# Patient Record
Sex: MALE | Race: WHITE | NOT HISPANIC OR LATINO | URBAN - METROPOLITAN AREA
[De-identification: names, ages, dates, MRNs, and addresses within clinical notes are randomized per-mention and may not be internally consistent; named-entity substitution may affect disease eponyms.]

---

## 2018-11-20 ENCOUNTER — INPATIENT (INPATIENT)
Facility: HOSPITAL | Age: 63
LOS: 1 days | Discharge: HOME | End: 2018-11-22
Attending: INTERNAL MEDICINE | Admitting: INTERNAL MEDICINE
Payer: COMMERCIAL

## 2018-11-20 VITALS
SYSTOLIC BLOOD PRESSURE: 142 MMHG | HEIGHT: 67 IN | HEART RATE: 67 BPM | TEMPERATURE: 98 F | OXYGEN SATURATION: 97 % | RESPIRATION RATE: 18 BRPM | DIASTOLIC BLOOD PRESSURE: 73 MMHG

## 2018-11-20 LAB
ABO RH CONFIRMATION: SIGNIFICANT CHANGE UP
ALBUMIN SERPL ELPH-MCNC: 3.3 G/DL — SIGNIFICANT CHANGE UP (ref 3.3–5)
ALP SERPL-CCNC: 63 U/L — SIGNIFICANT CHANGE UP (ref 40–120)
ALT FLD-CCNC: 20 U/L — SIGNIFICANT CHANGE UP (ref 12–78)
ANION GAP SERPL CALC-SCNC: 8 MMOL/L — SIGNIFICANT CHANGE UP (ref 5–17)
APTT BLD: 35.6 SEC — SIGNIFICANT CHANGE UP (ref 27.5–36.3)
AST SERPL-CCNC: 14 U/L — LOW (ref 15–37)
BASOPHILS # BLD AUTO: 0.06 K/UL — SIGNIFICANT CHANGE UP (ref 0–0.2)
BASOPHILS NFR BLD AUTO: 0.6 % — SIGNIFICANT CHANGE UP (ref 0–2)
BILIRUB SERPL-MCNC: 0.5 MG/DL — SIGNIFICANT CHANGE UP (ref 0.2–1.2)
BLD GP AB SCN SERPL QL: SIGNIFICANT CHANGE UP
BUN SERPL-MCNC: 15 MG/DL — SIGNIFICANT CHANGE UP (ref 7–23)
CALCIUM SERPL-MCNC: 9.6 MG/DL — SIGNIFICANT CHANGE UP (ref 8.5–10.1)
CHLORIDE SERPL-SCNC: 110 MMOL/L — HIGH (ref 96–108)
CK SERPL-CCNC: 129 U/L — SIGNIFICANT CHANGE UP (ref 26–308)
CO2 SERPL-SCNC: 25 MMOL/L — SIGNIFICANT CHANGE UP (ref 22–31)
CREAT SERPL-MCNC: 0.8 MG/DL — SIGNIFICANT CHANGE UP (ref 0.5–1.3)
EOSINOPHIL # BLD AUTO: 0.15 K/UL — SIGNIFICANT CHANGE UP (ref 0–0.5)
EOSINOPHIL NFR BLD AUTO: 1.6 % — SIGNIFICANT CHANGE UP (ref 0–6)
GLUCOSE SERPL-MCNC: 116 MG/DL — HIGH (ref 70–99)
HBA1C BLD-MCNC: 5.5 % — SIGNIFICANT CHANGE UP (ref 4–5.6)
HCT VFR BLD CALC: 41.9 % — SIGNIFICANT CHANGE UP (ref 39–50)
HGB BLD-MCNC: 13.9 G/DL — SIGNIFICANT CHANGE UP (ref 13–17)
IMM GRANULOCYTES NFR BLD AUTO: 0.4 % — SIGNIFICANT CHANGE UP (ref 0–1.5)
INR BLD: 1.09 RATIO — SIGNIFICANT CHANGE UP (ref 0.88–1.16)
LYMPHOCYTES # BLD AUTO: 1.84 K/UL — SIGNIFICANT CHANGE UP (ref 1–3.3)
LYMPHOCYTES # BLD AUTO: 19 % — SIGNIFICANT CHANGE UP (ref 13–44)
MCHC RBC-ENTMCNC: 30.4 PG — SIGNIFICANT CHANGE UP (ref 27–34)
MCHC RBC-ENTMCNC: 33.2 GM/DL — SIGNIFICANT CHANGE UP (ref 32–36)
MCV RBC AUTO: 91.7 FL — SIGNIFICANT CHANGE UP (ref 80–100)
MONOCYTES # BLD AUTO: 0.63 K/UL — SIGNIFICANT CHANGE UP (ref 0–0.9)
MONOCYTES NFR BLD AUTO: 6.5 % — SIGNIFICANT CHANGE UP (ref 2–14)
NEUTROPHILS # BLD AUTO: 6.95 K/UL — SIGNIFICANT CHANGE UP (ref 1.8–7.4)
NEUTROPHILS NFR BLD AUTO: 71.9 % — SIGNIFICANT CHANGE UP (ref 43–77)
NRBC # BLD: 0 /100 WBCS — SIGNIFICANT CHANGE UP (ref 0–0)
PLATELET # BLD AUTO: 220 K/UL — SIGNIFICANT CHANGE UP (ref 150–400)
POTASSIUM SERPL-MCNC: 4.1 MMOL/L — SIGNIFICANT CHANGE UP (ref 3.5–5.3)
POTASSIUM SERPL-SCNC: 4.1 MMOL/L — SIGNIFICANT CHANGE UP (ref 3.5–5.3)
PROT SERPL-MCNC: 6.2 GM/DL — SIGNIFICANT CHANGE UP (ref 6–8.3)
PROTHROM AB SERPL-ACNC: 12.1 SEC — SIGNIFICANT CHANGE UP (ref 10–12.9)
RBC # BLD: 4.57 M/UL — SIGNIFICANT CHANGE UP (ref 4.2–5.8)
RBC # FLD: 14.5 % — SIGNIFICANT CHANGE UP (ref 10.3–14.5)
SODIUM SERPL-SCNC: 143 MMOL/L — SIGNIFICANT CHANGE UP (ref 135–145)
TROPONIN I SERPL-MCNC: <0.015 NG/ML — SIGNIFICANT CHANGE UP (ref 0.01–0.04)
TYPE + AB SCN PNL BLD: SIGNIFICANT CHANGE UP
WBC # BLD: 9.67 K/UL — SIGNIFICANT CHANGE UP (ref 3.8–10.5)
WBC # FLD AUTO: 9.67 K/UL — SIGNIFICANT CHANGE UP (ref 3.8–10.5)

## 2018-11-20 PROCEDURE — 71045 X-RAY EXAM CHEST 1 VIEW: CPT | Mod: 26

## 2018-11-20 PROCEDURE — 70551 MRI BRAIN STEM W/O DYE: CPT | Mod: 26

## 2018-11-20 PROCEDURE — 70496 CT ANGIOGRAPHY HEAD: CPT | Mod: 26

## 2018-11-20 PROCEDURE — 99223 1ST HOSP IP/OBS HIGH 75: CPT

## 2018-11-20 PROCEDURE — 93010 ELECTROCARDIOGRAM REPORT: CPT

## 2018-11-20 PROCEDURE — 70450 CT HEAD/BRAIN W/O DYE: CPT | Mod: 26,59

## 2018-11-20 PROCEDURE — 99285 EMERGENCY DEPT VISIT HI MDM: CPT

## 2018-11-20 PROCEDURE — 93880 EXTRACRANIAL BILAT STUDY: CPT | Mod: 26

## 2018-11-20 PROCEDURE — 70498 CT ANGIOGRAPHY NECK: CPT | Mod: 26

## 2018-11-20 RX ORDER — PANTOPRAZOLE SODIUM 20 MG/1
40 TABLET, DELAYED RELEASE ORAL
Qty: 0 | Refills: 0 | Status: DISCONTINUED | OUTPATIENT
Start: 2018-11-20 | End: 2018-11-22

## 2018-11-20 RX ORDER — NICOTINE POLACRILEX 2 MG
1 GUM BUCCAL DAILY
Qty: 0 | Refills: 0 | Status: DISCONTINUED | OUTPATIENT
Start: 2018-11-20 | End: 2018-11-22

## 2018-11-20 RX ORDER — ENOXAPARIN SODIUM 100 MG/ML
40 INJECTION SUBCUTANEOUS EVERY 24 HOURS
Qty: 0 | Refills: 0 | Status: DISCONTINUED | OUTPATIENT
Start: 2018-11-20 | End: 2018-11-22

## 2018-11-20 RX ORDER — ATORVASTATIN CALCIUM 80 MG/1
80 TABLET, FILM COATED ORAL AT BEDTIME
Qty: 0 | Refills: 0 | Status: DISCONTINUED | OUTPATIENT
Start: 2018-11-20 | End: 2018-11-22

## 2018-11-20 RX ORDER — IBUPROFEN 200 MG
600 TABLET ORAL ONCE
Qty: 0 | Refills: 0 | Status: COMPLETED | OUTPATIENT
Start: 2018-11-20 | End: 2018-11-20

## 2018-11-20 RX ORDER — ASPIRIN/CALCIUM CARB/MAGNESIUM 324 MG
81 TABLET ORAL DAILY
Qty: 0 | Refills: 0 | Status: DISCONTINUED | OUTPATIENT
Start: 2018-11-20 | End: 2018-11-22

## 2018-11-20 RX ORDER — ASPIRIN/CALCIUM CARB/MAGNESIUM 324 MG
325 TABLET ORAL ONCE
Qty: 0 | Refills: 0 | Status: COMPLETED | OUTPATIENT
Start: 2018-11-20 | End: 2018-11-20

## 2018-11-20 RX ORDER — METOPROLOL TARTRATE 50 MG
25 TABLET ORAL AT BEDTIME
Qty: 0 | Refills: 0 | Status: DISCONTINUED | OUTPATIENT
Start: 2018-11-20 | End: 2018-11-22

## 2018-11-20 RX ORDER — TETANUS TOXOID, REDUCED DIPHTHERIA TOXOID AND ACELLULAR PERTUSSIS VACCINE, ADSORBED 5; 2.5; 8; 8; 2.5 [IU]/.5ML; [IU]/.5ML; UG/.5ML; UG/.5ML; UG/.5ML
0.5 SUSPENSION INTRAMUSCULAR ONCE
Qty: 0 | Refills: 0 | Status: COMPLETED | OUTPATIENT
Start: 2018-11-20 | End: 2018-11-20

## 2018-11-20 RX ADMIN — TETANUS TOXOID, REDUCED DIPHTHERIA TOXOID AND ACELLULAR PERTUSSIS VACCINE, ADSORBED 0.5 MILLILITER(S): 5; 2.5; 8; 8; 2.5 SUSPENSION INTRAMUSCULAR at 10:34

## 2018-11-20 RX ADMIN — Medication 1 PATCH: at 21:23

## 2018-11-20 RX ADMIN — ENOXAPARIN SODIUM 40 MILLIGRAM(S): 100 INJECTION SUBCUTANEOUS at 16:23

## 2018-11-20 RX ADMIN — Medication 25 MILLIGRAM(S): at 21:48

## 2018-11-20 RX ADMIN — Medication 1 PATCH: at 17:14

## 2018-11-20 RX ADMIN — ATORVASTATIN CALCIUM 80 MILLIGRAM(S): 80 TABLET, FILM COATED ORAL at 21:48

## 2018-11-20 RX ADMIN — Medication 325 MILLIGRAM(S): at 09:24

## 2018-11-20 RX ADMIN — Medication 600 MILLIGRAM(S): at 22:39

## 2018-11-20 NOTE — H&P ADULT - ASSESSMENT
Left sided weakness likely d/t acute CVA not visualized on CT/CTA head  MRI head  ck lipids / A1c  lipitor 80mg HS (takes 10 crestor at home)  start asa 81mg / protonix po  appreciate neuro input  PT/OT consult  admit to tele for cardiac monitoring r/o arrhythmia    Left ICA stenosis on CTA neck  vascular consult  statin      Nicotine dependence  smoking cessation education/counseling   start nicotine patch    CAD/hx CABG  chest pain free  con't home meds: statin/BB    DVT PPX  lovenox    IMPROVE VTE Individual Risk Assessment    RISK                                                                Points    [  ] Previous VTE                                                  3    [  ] Thrombophilia                                               2    [  ] Lower limb paralysis                                      2        (unable to hold up >15 seconds)      [  ] Current Cancer                                              2         (within 6 months)    [  ] Immobilization > 24 hrs                                1    [  ] ICU/CCU stay > 24 hours                              1    [ x] Age > 60                                                      1    IMPROVE VTE Score 1    Dispo  goals of care and plan of care d/w patient and spouse at bedside  full code  pt and spouse in agreement with plan of care

## 2018-11-20 NOTE — CONSULT NOTE ADULT - ASSESSMENT
PT is a 63 year old man with extensive cardiac history, +HTN, +CAD s/p CABG, and HLD who presented to the ED c/o a strange feeling on his left side, unsteady gait, and double vision.     Dx most likely stroke not visualized on CT    Recommendations:  Admit to Telemetry for cardiac monitoring  ASA 325mg PO X1 now  MRI of the head  Vascular Surgery consult for Right ICA stenosis   Check Lipid panel and continue Statin medication   Physical Therapy consult     Pt seen and examined with Dr. Centeno who has reviewed all imaging, examined the patient and agrees with plan

## 2018-11-20 NOTE — ED PROVIDER NOTE - EYES, MLM
Clear bilaterally, pupils equal, round and reactive to light.  R eye doesn't cross L of M/L.  L eye + EOMI.

## 2018-11-20 NOTE — H&P ADULT - FAMILY HISTORY
Grandparent  Still living? No  Family history of myocardial infarction, Age at diagnosis: Age Unknown

## 2018-11-20 NOTE — ED ADULT NURSE NOTE - OBJECTIVE STATEMENT
Pt biba c/o difficulty seeing through left eye and left sided droop. As per pt, At 1:30am today, patient "walked to bathroom without difficulty and had no visual loss" As per patient. at 6:55am today "patient's alarm went off and had left eye visual loss and left sided droop." Pt also stated "I slipped and fell on the grass and fell on my left shoulder. I remembered the entire event" Pt evaluated by MD Bowie at triage, code stroke called. Pt. able follow commands, alert and oriented at this time. Dysphagia screen passed, EKG done. Cardiac monitoring in progress, will continue to monitor

## 2018-11-20 NOTE — ED PROVIDER NOTE - OBJECTIVE STATEMENT
62 y/o male with PMHx of HTN, HLD, CAD s/p CABG x4, s/p endovascular repair AAA in 2010 presents to the ED BIBA from home c/o dizziness, difficult ambulating, left facial numbness, double vision. As per wife, she has noticed pt also has a left facial droop. Pt noted sx when he woke up at 06:55. Last known well was at 01:55 when patient went to the restroom and was asymptomatic. Pt stopped ASA 4 month ago due to abd pain. Denies focal extremity weakness or difficulty swallowing. Smoker.

## 2018-11-20 NOTE — ED PROVIDER NOTE - CARE PLAN
Principal Discharge DX:	CVA (cerebral vascular accident)  Secondary Diagnosis:	Diplopia  Secondary Diagnosis:	Difficulty walking

## 2018-11-20 NOTE — SWALLOW BEDSIDE ASSESSMENT ADULT - COMMENTS
The patient was admitted to  with gait unsteadiness, diplopia and feeling of "loss of control" on left side of body. Hospital course is notable for slight left facial droop and partial 3rd nerve palsy on the right. An acute CVA is suspected. Neuro w/u is in progress. This profile is superimposed upon a history of attention deficit disorder, HLD, HTN, CAD s/p CABG, AAA s/p repair, previous right hernia repair, and past tonsillectomy. The patient was admitted to  with gait unsteadiness, diplopia and feeling of "loss of control" on left side of his body. Hospital course is notable for slight left facial droop and partial 3rd nerve palsy on the right. An acute CVA is suspected. Neuro w/u is in progress. This profile is superimposed upon a history of attention deficit disorder, HLD, HTN, CAD s/p CABG, AAA s/p repair, previous right hernia repair, and past tonsillectomy.

## 2018-11-20 NOTE — H&P ADULT - NSHPPHYSICALEXAM_GEN_ALL_CORE
PHYSICAL EXAM:    GENERAL: NAD    HEENT:  pupils round equal and reactive, mild left ptosis no oropharyngeal lesions, erythema, exudates, oral thrush    NECK:   supple, no carotid bruits, no palpable lymph nodes, no thyromegaly    CV:  +S1, +S2, regular,  no m/c/r    RESP:  scattered fine inspiratory wheezing otherwise lungs clear to auscultation bilaterally, no rales, rhonchi, good air entry bilaterally    BREAST:  not examined    GI:  abdomen soft, non-tender, non-distended, normal BS, no bruits, no abdominal masses, no palpable masses    RECTAL:  not examined    :  no suprapubic tenderness    MSK: normal muscle tone, no atrophy, no rigidity, no contractions    EXT: chronic vascular changes to BLE,  no clubbing, no cyanosis, no edema, no calf pain, swelling or erythema    VASCULAR:  pulses equal and symmetric in the upper and lower extremities    NEURO:  AAOX3, speech clear, mild left ptosis. no pronation drift, lifts and resist BUE/BLE strongly 5/5, tongue midline, no clr facial droop identified (wife at bedside reports improvement since onset of symptoms), follows all commands, able to move extremities spontaneously    SKIN:  no ulcers, lesions or rashes

## 2018-11-20 NOTE — ED ADULT NURSE REASSESSMENT NOTE - NS ED NURSE REASSESS COMMENT FT1
Patient remains as previously assessed. VSS, denies pain/discomfort. No s/s of distress present. Plan of care discussed. Hand-off report to IESHA Che, awaiting transport to . Safety & comfort measures in place, purposeful active rounding on my time.

## 2018-11-20 NOTE — CONSULT NOTE ADULT - SUBJECTIVE AND OBJECTIVE BOX
Vascular called to evaluate a code stroke patient for symptomatic Carotid stenosis found on workup    HPI as per chart 63 year old Man presented to ED c/o a strange feeling on his left side, unsteady gait, and double vision. The patient states he went to bed at 11:30PM and was feeling fine, he states he got up for a drink of water at 1:30 and was feeing fine, and at 6:45am his alarm went off at that time he states his left sided felt "funny" when he got up to walk he fell towards his left side, he felt unbalanced but denies dizziness. Pt with pmhx HTN, hyperlipidemia, endograft AAA repair 2/2010, CAD with 4V CABG 6/2009 with subsequent PTCA at [place].    In the ED Code Stroke was called at 8:27 (he arrived at 8:22) CT head  negative for any acute stroke or hemorrhage the CTA shows minimal flow through the LEFT ICA, ED exam he was found to have partial 3rd nerve palsy in the right along with a slight left facial, double vision and unsteady on his feet NIHSS 1- he was deemed an unlikely candidate for t-PA as his last known well was outside the time window.     He received  mg x 1    Subjective: still having double vision but has improved much, denies HA/dizziness/CP/palpitations/SOB/n/v/d/f/c        Upon my encounter majority of his symptoms have improved- stil complains of blurry vision, still has left facial droop      ICU Vital Signs Last 24 Hrs  T(C): 36.8 (20 Nov 2018 14:23), Max: 37.1 (20 Nov 2018 12:31)  T(F): 98.2 (20 Nov 2018 14:23), Max: 98.8 (20 Nov 2018 12:31)  HR: 61 (20 Nov 2018 14:23) (61 - 75)  BP: 145/66 (20 Nov 2018 14:23) (111/70 - 145/66)  BP(mean): --  ABP: --  ABP(mean): --  RR: 17 (20 Nov 2018 14:23) (16 - 19)  SpO2: 98% (20 Nov 2018 14:23) (97% - 99%)        Gen aaox3 nad  NEURO CN2-12 WNL  Left facial droop  Upper extremity strenght 4/5 on left  Right 5/5  Sensation intact   cardiac s1 s2  Neck supple no jvd, no previous surigcal scars  lungs clear  abd soft                           13.9   9.67  )-----------( 220      ( 20 Nov 2018 08:48 )             41.9                         13.9   9.67  )-----------( 220      ( 20 Nov 2018 08:48 )             41.9   11-20    143  |  110<H>  |  15  ----------------------------<  116<H>  4.1   |  25  |  0.80    Ca    9.6      20 Nov 2018 08:48    TPro  6.2  /  Alb  3.3  /  TBili  0.5  /  DBili  x   /  AST  14<L>  /  ALT  20  /  AlkPhos  63  11-20  < from: US Duplex Carotid Arteries Complete, Bilateral (11.20.18 @ 15:38) >      < end of copied text >

## 2018-11-20 NOTE — SWALLOW BEDSIDE ASSESSMENT ADULT - SWALLOW EVAL: DIAGNOSIS
1) The patient exhibits Oropharyngeal Swallowing abilities which subjectively appear to be stable and within functional parameters for age. NO behavioral aspiration signs were exhibited on exam.  2) The pt was alert and interactive. He c/o difficulties visually focusing. He was oriented x3+ and able to verbalize during communicative probes as well as in conversation. At these times. his motor speech abilities were normal and his linguistic abilities are preserved. The pt is able to verbalize his intents effectively and is reportedly at communicative baseline.

## 2018-11-20 NOTE — CONSULT NOTE ADULT - ATTENDING COMMENTS
Agree with above.  On examination the patient is unable to fully adduct his right eye and has mild left ptosis.   His gait is unsteady.  He is not a candidate for tpa and there is no evidence of large vessel thrombus to necessitate transfer.  He did recently stop his aspirin and this will be restarted.  Vascular consult for critical stenosis of left ICA.  Patient counselled in smoking cessation.

## 2018-11-20 NOTE — CONSULT NOTE ADULT - ASSESSMENT
A/P 63M with TIA left sided symptoms and U/S findings of carotid stenosis      -recommend antiplatelet therapy  -recommend statin  -smoking cessation  -will likely be candidate for CEA tentative on this admission vs elective, pending family discussion  -cardiology clearance needed,     d/w Dr Burciaga

## 2018-11-20 NOTE — SWALLOW BEDSIDE ASSESSMENT ADULT - SWALLOW EVAL: CRITERIA FOR SKILLED INTERVENTION MET
NO NEED FOR SPEECH OR SWALLOWING THERAPY. THUS, WILL NOT ACTIVELY FOLLOW. RECONSULT PRN./no problems identified which require skilled intervention

## 2018-11-20 NOTE — CONSULT NOTE ADULT - SUBJECTIVE AND OBJECTIVE BOX
Patient is a 63y old  Male who presents with a chief complaint of "my left side feels funny"    HPI: PT is a 63 year old man with extensive cardiac history, +HTN, +CAD s/p CABG, and HLD who presented to the ED c/o a strange feeling on his left side, unsteady gait, and double vision. The patient states he went to bed at 11:30PM and was feeling fine, he states he got up for a drink of water at 1:30 and was feeing fine, and at 6:45am his alarm went off at that time he states his left sided felt "funny" when he got up to walk he fell towards his left side, he felt unbalanced but denies dizziness. Pt also states he has double vision. He was able to call his wife who came home from work-- she states she found him to have some left sided facial droop, which to her looks like it is resolving.   The pt presented to the ED at 8:22am and a Code Stroke was called at 8:27- the pt was taken to CT. Dr. Centeno and myself met the patient in the CT scanner.   The CT head is negative for any acute stroke or hemorrhage the CTA shows minimal flow through the LEFT ICA.   On exam the patient is awake and alert, he has no strength or sensory deficits, he does have a partial 3rd nerve palsy in the right along with a slight left facial, double vision and is unsteady on his feet. NIHSS 1- he is not a candidate for t-PA as his last known well is outside the time window.     PAST MEDICAL & SURGICAL HISTORY:  Hyperlipidemia  HTN (Hypertension), Benign  ADD (Attention Deficit Disorder)  CAD (Coronary Artery Disease)  S/P right hernia repair  S/P Tonsillectomy  S/P CABG 5/2009 x 4 vessels   S/P AAA (Abdominal Aortic Aneurysm): Dx. 5/2009    FAMILY HISTORY: non-contributory     Social Hx:  +smoker 1/2 PPD, occasional ETOH, no drug use, retired computer programer     MEDICATIONS  (STANDING):  aspirin 325 milliGRAM(s) Oral Once    HOME MEDS:  Metoprolol ER 25mg PO QD  Crestor 10mg PO QD  (pt stopped taking his prescribed ASA 3 weeks ago due to GI upset)      Allergies: No Known Allergies    ROS: Pertinent positives in HPI, all other ROS were reviewed and are negative.      Vital Signs Last 24 Hrs  T(C): 36.6 (20 Nov 2018 08:35), Max: 36.6 (20 Nov 2018 08:35)  T(F): 97.9 (20 Nov 2018 08:35), Max: 97.9 (20 Nov 2018 08:35)  HR: 67 (20 Nov 2018 08:35) (67 - 67)  BP: 142/73 (20 Nov 2018 08:35) (142/73 - 142/73)  RR: 18 (20 Nov 2018 08:35) (18 - 18)  SpO2: 97% (20 Nov 2018 08:35) (97% - 97%)    PHYSICAL EXAM:   Constitutional: awake and alert  HEENT: PERRLA, +Right partial third nerve palsy   Neck: Supple  Respiratory: Breath sounds are clear bilaterally  Cardiovascular: S1 and S2, regular rhythm  Gastrointestinal: soft, nontender  Extremities:  no edema  Vascular: Carotid Bruit - no  Musculoskeletal: no joint swelling/tenderness, no abnormal movements  Skin: No rashes    Neurological exam:  HF: A x O x 3. Appropriately interactive, normal affect. Speech fluent, No Aphasia or paraphasic errors. Naming /repetition intact   CN: PEARRL, + right partial third nerve palsy +double vision, slight left facial weakness, VFF, facial sensation normal, tongue midline, Palate moves equally, SCM equal bilaterally  Motor: No pronator drift, Strength 5/5 in all 4 ext, normal bulk and tone, no tremor, rigidity or bradykinesia.    Sens: Intact to light touch / PP/ VS/ JS    Reflexes: Symmetric and normal, downgoing toes b/l  Coord:  No FNFA, dysmetria, ADA intact   Gait/Balance: unsteady gait     NIHSS: 1    Labs:                        13.9   9.67  )-----------( 220      ( 20 Nov 2018 08:48 )             41.9     11-20    143  |  110<H>  |  15  ----------------------------<  116<H>  4.1   |  25  |  0.80    Ca    9.6      20 Nov 2018 08:48    TPro  6.2  /  Alb  3.3  /  TBili  0.5  /  DBili  x   /  AST  14<L>  /  ALT  20  /  AlkPhos  63  11-20    PT/INR - ( 20 Nov 2018 08:48 )   PT: 12.1 sec;   INR: 1.09 ratio       PTT - ( 20 Nov 2018 08:48 )  PTT:35.6 sec    RADIOLOGY:  < from: CT Brain Stroke Protocol (11.20.18 @ 09:01) >  FINDINGS:    There is no loss of the gray-white matter distinction to indicate acute   territorial infarct. No acute intracranial hemorrhage.  There is no hydrocephalus, mass effect or midline shift.  No extra-axial collection.     The visualized orbits are unremarkable.  The calvarium is intact.  The visualized paranasal sinuses and mastoid air cells are clear.      < from: CT Angio Head w/ IV Cont (11.20.18 @ 08:59) >  IMPRESSION:  CTA HEAD:  No significant stenosis, occlusion, or aneurysm.    CTA NECK:  Moderate soft/calcified plaque within the left carotid bulb/origin of the   left ICA resulting in hairline/critical (90-99%) stenosis of the origin   of the left ICA.    Mild soft/calcified plaque within the right carotid bulb/origin of the   right ICA resulting in mild (less than 50%) stenosis of the origin of the   right ICA.

## 2018-11-20 NOTE — ED PROVIDER NOTE - PROGRESS NOTE DETAILS
Dr. Bowie:  CT head noncon verbal report: no acute pathology. Dodie TAVARES for Dr. Bowie: CTA head and neck; no occluding thrombus. Pt evaluated by neuro who states pt is NOT a TPA candidate. CTA neck showed +significant narrowing of internal carotid artery. ASA ordered, will admit to tele for inpatient vascular and cardiology consult. Dr. Bowie:  CT head noncon verbal report: no acute pathology.  Neuro attdg Dr. Centeno aware & at pt bedside.

## 2018-11-20 NOTE — SWALLOW BEDSIDE ASSESSMENT ADULT - SWALLOW EVAL: RECOMMENDED DIET
SUGGEST A REGULAR TEXTURE DIET WITH THIN LIQUID CONSISTENCIES AS HE TOLERATES THESE FOOD CONSISTENCIES FROM AN OROPHARYNGEAL SWALLOWING STANCE ON CLINICAL EXAM.

## 2018-11-20 NOTE — ED PROVIDER NOTE - PMH
AAA (Abdominal Aortic Aneurysm)  Dx. 5/2009- attempted repair  12/2009 surgery aborted due to hypotension.r  ADD (Attention Deficit Disorder)    CABG (Coronary Artery Bypass Graft)x4 6/09    CAD (Coronary Artery Disease)    HTN (Hypertension), Benign    Hyperlipidemia

## 2018-11-20 NOTE — SWALLOW BEDSIDE ASSESSMENT ADULT - SLP GENERAL OBSERVATIONS
The pt was alert and interactive. He c/o difficulties visually focusing. He was oriented x3+ and able to verbalize during communicative probes as well as in conversation. At these times. his motor speech abilities were normal and his linguistic abilities are preserved. The pt is able to verbalize his intents effectively and is reportedly at communicative baseline. The pt denied Dysphagia when asked.

## 2018-11-20 NOTE — ED ADULT NURSE REASSESSMENT NOTE - NS ED NURSE REASSESS COMMENT FT1
Patient care received from RN Bryce HERNANDES. Patient A&Ox4, resting comfortably in bed. VSS, reports dyplopia, neuro checks otherwise unremarkable. Plan of care discussed, wait time explained; unit bed assignment pending. Safety & comfort measures in place, spouse bedside. Will continue to monitor.

## 2018-11-20 NOTE — ED ADULT NURSE NOTE - PSH
HTN (Hypertension), Benign    right hernia repair    S/P CABG (Coronary Artery Bypass Graft)  5/2009 x 4 vessels  S/P Tonsillectomy

## 2018-11-20 NOTE — ED ADULT TRIAGE NOTE - CHIEF COMPLAINT QUOTE
pt BIBEMS from home c/o L sided weakness noted upon waking up this morning at 0655 AM. pt reports feeling weak on his L side and "toppling over", no c/o L shoulder pain 4/10 and L eye pain 4/10.  strengths equal bilaterally, no facial asymmetry noted, pt speaking in full coherent sentences a&ox3. MD Bowie called to EMS desk to assess for Code Stroke- code stroke called at 0827. pt sent directly to CT.

## 2018-11-20 NOTE — ED ADULT NURSE REASSESSMENT NOTE - NS ED NURSE REASSESS COMMENT FT1
Pt verbalized understanding of admission process. Menu provided to patient. Will continue to monitor

## 2018-11-20 NOTE — H&P ADULT - NSHPREVIEWOFSYSTEMS_GEN_ALL_CORE
REVIEW OF SYSTEMS    General: denies fever, chills    Skin/Breast: no changes  	  Ophthalmologic: no vision changes  	  ENMT:  normal    Respiratory and Thorax: chronic "smoker's" cough  	  Cardiovascular: denies CP/palpitations    Gastrointestinal: normal BM, no abd pain    Genitourinary: no suprapubic tenderness    Musculoskeletal: no muscle tenderness, no joint swelling or tenderness    Neurological: as in HPI    Psychiatric: denies S/H ideation, no depression/anxiety	    Hematology/Lymphatics: normal, no LN palpable	    Endocrine: normal    Allergic/Immunologic:	      REVIEW OF SYSTEM: ROS comprehensively negative except as above

## 2018-11-20 NOTE — ED ADULT NURSE NOTE - NSIMPLEMENTINTERV_GEN_ALL_ED
Implemented All Fall Risk Interventions:  [place] to call system. Call bell, personal items and telephone within reach. Instruct patient to call for assistance. Room bathroom lighting operational. Non-slip footwear when patient is off stretcher. Physically safe environment: no spills, clutter or unnecessary equipment. Stretcher in lowest position, wheels locked, appropriate side rails in place. Provide visual cue, wrist band, yellow gown, etc. Monitor gait and stability. Monitor for mental status changes and reorient to person, place, and time. Review medications for side effects contributing to fall risk. Reinforce activity limits and safety measures with patient and family.

## 2018-11-20 NOTE — H&P ADULT - NSHPSOCIALHISTORY_GEN_ALL_CORE
Active tobacco use 1/2PPD (onset of use was teenage yrs) Quit 1 PPD using chantix in 2010 then restarted @ 1/2PPD.  [employer] socially  denies illicit drug use  Retired ; lives with wife

## 2018-11-20 NOTE — ED ADULT NURSE REASSESSMENT NOTE - NS ED NURSE REASSESS COMMENT FT1
Patient has small laceration on nose, evaluated by MD Bowie. No imaging to be done at this time. Wound cleaned at this time. Will continue to monitor

## 2018-11-20 NOTE — ED PROVIDER NOTE - CONSTITUTIONAL, MLM
normal... WM, awake, alert, oriented to person, place, time/situation and in no apparent distress.  No respiratory discomfort.

## 2018-11-20 NOTE — H&P ADULT - HISTORY OF PRESENT ILLNESS
63 year old Man presented to ED c/o a strange feeling on his left side, unsteady gait, and double vision. The patient states he went to bed at 11:30PM and was feeling fine, he states he got up for a drink of water at 1:30 and was feeing fine, and at 6:45am his alarm went off at that time he states his left sided felt "funny" when he got up to walk he fell towards his left side, he felt unbalanced but denies dizziness. Pt with pmhx HTN, hyperlipidemia, endograft AAA repair 2/2010, CAD with 4V CABG 6/2009 with subsequent PTCA at [place].    In the ED Code Stroke was called at 8:27 (he arrived at 8:22) CT head  negative for any acute stroke or hemorrhage the CTA shows minimal flow through the LEFT ICA, ED exam he was found to have partial 3rd nerve palsy in the right along with a slight left facial, double vision and unsteady on his feet NIHSS 1- he was deemed an unlikely candidate for t-PA as his last known well was outside the time window.     He received  mg x 1    Subjective: still having double vision but has improved much, denies HA/dizziness/CP/palpitations/SOB/n/v/d/f/c

## 2018-11-21 LAB
ALBUMIN SERPL ELPH-MCNC: 3.2 G/DL — LOW (ref 3.3–5)
ALP SERPL-CCNC: 62 U/L — SIGNIFICANT CHANGE UP (ref 40–120)
ALT FLD-CCNC: 21 U/L — SIGNIFICANT CHANGE UP (ref 12–78)
ANION GAP SERPL CALC-SCNC: 7 MMOL/L — SIGNIFICANT CHANGE UP (ref 5–17)
AST SERPL-CCNC: 16 U/L — SIGNIFICANT CHANGE UP (ref 15–37)
B BURGDOR C6 AB SER-ACNC: NEGATIVE — SIGNIFICANT CHANGE UP
B BURGDOR IGG+IGM SER-ACNC: 0.26 INDEX — SIGNIFICANT CHANGE UP (ref 0.01–0.89)
BILIRUB DIRECT SERPL-MCNC: 0.1 MG/DL — SIGNIFICANT CHANGE UP (ref 0–0.2)
BILIRUB INDIRECT FLD-MCNC: 0.4 MG/DL — SIGNIFICANT CHANGE UP (ref 0.2–1)
BILIRUB SERPL-MCNC: 0.5 MG/DL — SIGNIFICANT CHANGE UP (ref 0.2–1.2)
BUN SERPL-MCNC: 12 MG/DL — SIGNIFICANT CHANGE UP (ref 7–23)
CALCIUM SERPL-MCNC: 9.7 MG/DL — SIGNIFICANT CHANGE UP (ref 8.5–10.1)
CHLORIDE SERPL-SCNC: 115 MMOL/L — HIGH (ref 96–108)
CHOLEST SERPL-MCNC: 124 MG/DL — SIGNIFICANT CHANGE UP (ref 10–199)
CO2 SERPL-SCNC: 24 MMOL/L — SIGNIFICANT CHANGE UP (ref 22–31)
CREAT SERPL-MCNC: 0.7 MG/DL — SIGNIFICANT CHANGE UP (ref 0.5–1.3)
ESTIMATED AVERAGE GLUCOSE: 111 MG/DL — SIGNIFICANT CHANGE UP (ref 68–114)
GLUCOSE SERPL-MCNC: 107 MG/DL — HIGH (ref 70–99)
HBA1C BLD-MCNC: 5.5 % — SIGNIFICANT CHANGE UP (ref 4–5.6)
HDLC SERPL-MCNC: 36 MG/DL — LOW
LIPID PNL WITH DIRECT LDL SERPL: 70 MG/DL — SIGNIFICANT CHANGE UP
POTASSIUM SERPL-MCNC: 3.8 MMOL/L — SIGNIFICANT CHANGE UP (ref 3.5–5.3)
POTASSIUM SERPL-SCNC: 3.8 MMOL/L — SIGNIFICANT CHANGE UP (ref 3.5–5.3)
PROT SERPL-MCNC: 6.2 GM/DL — SIGNIFICANT CHANGE UP (ref 6–8.3)
SODIUM SERPL-SCNC: 146 MMOL/L — HIGH (ref 135–145)
TOTAL CHOLESTEROL/HDL RATIO MEASUREMENT: 3.4 RATIO — SIGNIFICANT CHANGE UP (ref 3.4–9.6)
TRIGL SERPL-MCNC: 91 MG/DL — SIGNIFICANT CHANGE UP (ref 10–149)

## 2018-11-21 PROCEDURE — 99232 SBSQ HOSP IP/OBS MODERATE 35: CPT

## 2018-11-21 PROCEDURE — 93010 ELECTROCARDIOGRAM REPORT: CPT

## 2018-11-21 RX ORDER — CLOPIDOGREL BISULFATE 75 MG/1
75 TABLET, FILM COATED ORAL DAILY
Qty: 0 | Refills: 0 | Status: DISCONTINUED | OUTPATIENT
Start: 2018-11-21 | End: 2018-11-22

## 2018-11-21 RX ADMIN — CLOPIDOGREL BISULFATE 75 MILLIGRAM(S): 75 TABLET, FILM COATED ORAL at 11:53

## 2018-11-21 RX ADMIN — Medication 600 MILLIGRAM(S): at 00:08

## 2018-11-21 RX ADMIN — Medication 1 PATCH: at 11:53

## 2018-11-21 RX ADMIN — Medication 81 MILLIGRAM(S): at 11:53

## 2018-11-21 RX ADMIN — ATORVASTATIN CALCIUM 80 MILLIGRAM(S): 80 TABLET, FILM COATED ORAL at 21:02

## 2018-11-21 RX ADMIN — PANTOPRAZOLE SODIUM 40 MILLIGRAM(S): 20 TABLET, DELAYED RELEASE ORAL at 06:08

## 2018-11-21 RX ADMIN — Medication 25 MILLIGRAM(S): at 21:02

## 2018-11-21 RX ADMIN — Medication 1 PATCH: at 06:08

## 2018-11-21 RX ADMIN — ENOXAPARIN SODIUM 40 MILLIGRAM(S): 100 INJECTION SUBCUTANEOUS at 17:17

## 2018-11-21 RX ADMIN — Medication 1 PATCH: at 18:35

## 2018-11-21 NOTE — CHART NOTE - NSCHARTNOTEFT_GEN_A_CORE
Provider notified for left arm pain. Pt was admitted for fall onto his left side. Pt reports he fell on his left shoulder. Pt has new pain in his left arm which he attributes to muscle stiffness. He believes he didn't' feel it earlier because he was being asked to move it around , and now that he hasn't been moving it, it hurts. Pt reports the pain is mostly in upper arm. Left shoulder also mildly hurts with movement.     On exam, pt has full range of motion of left arm. Passive movement causes less pain than active movement. No tenderness to palpation. No gross or palpable deformity of left arm. No redness or swelling.     a/p: 65 yo w/ left arm pain after falling likely musculoskeletal, less likely fracture  - pain control with ibuprofen   - shoulder, humerus, and elbow XR    d/w Dr. Khan, PGY3

## 2018-11-21 NOTE — PROGRESS NOTE ADULT - SUBJECTIVE AND OBJECTIVE BOX
63 year old male, active smoker, admitted yesterday with left sided weakness, left facial droop and diplopia.  He denies visual field deficits or ipsilateral vision loss.  Symptoms have now resolved.  No previous history of TIA, CVA or amaurosis fugax.    Vital Signs Last 24 Hrs  T(C): 36.2 (21 Nov 2018 04:50), Max: 37.1 (20 Nov 2018 12:31)  T(F): 97.2 (21 Nov 2018 04:50), Max: 98.8 (20 Nov 2018 12:31)  HR: 57 (21 Nov 2018 04:50) (57 - 75)  BP: 122/62 (20 Nov 2018 21:02) (111/70 - 145/66)  BP(mean): --  RR: 18 (20 Nov 2018 21:02) (16 - 19)  SpO2: 97% (21 Nov 2018 04:50) (97% - 99%)    Exam  Well appearing  No obvious CN deficits  No carotid bruits  Abd soft, ND, no pulsatile mass  Bilateral LE pulses palpable, no widened popliteal impulses  Gross motor 5/5 in all muscle groups    Brain MRI: No acute infarct or hemorrhage  CTA Neck: High grade Left ICA stenosis, <50% Right ICA stenosis. Concordant with carotid duplex US.

## 2018-11-21 NOTE — PROGRESS NOTE ADULT - ASSESSMENT
Mr. Angel is a 63 year old man who presented with diplopia and falling to the left when walking.   He had an incidental finding of severe stenosis of the left ICA, but most of his symptoms for this episode are related to the right hemisphere.  MRI brain does not show any acute infarct.  Possible TIA vs inflammatory etiology.  Lyme and ACE levels sent. Will follow these up as an outpatient.  Vascular consult appreciated. He will not have CEA on this admission but it will be done in the near future.  Continue aspirin + plavix.  Continue statin.  The patient was counselled about smoking cessation.   PT nancy appreciated.

## 2018-11-21 NOTE — PROGRESS NOTE ADULT - SUBJECTIVE AND OBJECTIVE BOX
63 year old Man presented to ED c/o a strange feeling on his left side, unsteady gait, and double vision. The patient states he went to bed at 11:30PM and was feeling fine, he states he got up for a drink of water at 1:30 and was feeing fine, and at 6:45am his alarm went off at that time he states his left sided felt "funny" when he got up to walk he fell towards his left side, he felt unbalanced but denies dizziness. 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    In the ED Code Stroke was called at 8:27 (he arrived at 8:22) CT head  negative for any acute stroke or hemorrhage the CTA shows minimal flow through the LEFT ICA, ED exam he was found to have partial 3rd nerve palsy in the right along with a slight left facial, double vision and unsteady on his feet NIHSS 1- he was deemed an unlikely candidate for t-PA as his last known well was outside the time window.       11/21/18: Patient seen and examined. No more double vision. Feels better. Discussed with patient regarding management and d/c plan. Discussed with Dr Durant and vascular.         Vital Signs Last 24 Hrs  T(C): 36.6 (21 Nov 2018 11:18), Max: 36.8 (20 Nov 2018 21:02)  T(F): 97.9 (21 Nov 2018 11:18), Max: 98.2 (20 Nov 2018 21:02)  HR: 61 (21 Nov 2018 11:18) (57 - 63)  BP: 119/59 (21 Nov 2018 11:18) (119/59 - 122/62)  BP(mean): --  RR: 17 (21 Nov 2018 11:18) (17 - 18)  SpO2: 97% (21 Nov 2018 11:18) (97% - 98%)        PHYSICAL EXAM:    	GENERAL: NAD    	HEENT:  pupils round equal and reactive, mild left ptosis no oropharyngeal lesions, erythema, exudates, oral thrush    	NECK:   supple, no carotid bruits, no palpable lymph nodes, no thyromegaly    	CV:  +S1, +S2, regular,  no m/c/r    	RESP:  scattered fine inspiratory wheezing otherwise lungs clear to auscultation bilaterally, no rales, rhonchi, good air entry bilaterally    	BREAST:  not examined    	GI:  abdomen soft, non-tender, non-distended, normal BS, no bruits, no abdominal masses, no palpable masses    	RECTAL:  not examined    	:  no suprapubic tenderness    	MSK: normal muscle tone, no atrophy, no rigidity, no contractions    	EXT: chronic vascular changes to BLE,  no clubbing, no cyanosis, no edema, no calf pain, swelling or erythema    	VASCULAR:  pulses equal and symmetric in the upper and lower extremities    	NEURO:  AAOX3, speech clear, mild left ptosis. no pronation drift, lifts and resist BUE/BLE strongly 5/5, tongue midline, no clr facial droop identified (wife at bedside reports improvement since onset of symptoms), follows all commands, able to move extremities spontaneously    	SKIN:  no ulcers, lesions or rashes.            Labs:                             13.9   9.67  )-----------( 220      ( 20 Nov 2018 08:48 )             41.9     21 Nov 2018 06:58    146    |  115    |  12     ----------------------------<  107    3.8     |  24     |  0.70     Ca    9.7        21 Nov 2018 06:58    TPro  6.2    /  Alb  3.2    /  TBili  0.5    /  DBili  0.1    /  AST  16     /  ALT  21     /  AlkPhos  62     21 Nov 2018 06:58    LIVER FUNCTIONS - ( 21 Nov 2018 06:58 )  Alb: 3.2 g/dL / Pro: 6.2 gm/dL / ALK PHOS: 62 U/L / ALT: 21 U/L / AST: 16 U/L / GGT: x           PT/INR - ( 20 Nov 2018 08:48 )   PT: 12.1 sec;   INR: 1.09 ratio         PTT - ( 20 Nov 2018 08:48 )  PTT:35.6 sec  CAPILLARY BLOOD GLUCOSE        CARDIAC MARKERS ( 20 Nov 2018 08:48 )  <0.015 ng/mL / x     / 129 U/L / x     / x                MEDICATIONS:    aspirin enteric coated 81 milliGRAM(s) Oral daily  atorvastatin 80 milliGRAM(s) Oral at bedtime  clopidogrel Tablet 75 milliGRAM(s) Oral daily  enoxaparin Injectable 40 milliGRAM(s) SubCutaneous every 24 hours  metoprolol succinate ER 25 milliGRAM(s) Oral at bedtime  nicotine -  14 mG/24Hr(s) Patch 1 Patch Transdermal daily  pantoprazole    Tablet 40 milliGRAM(s) Oral before breakfast    MEDICATIONS  (PRN):            Assessment and Plan:   Assessment:  · Assessment		  Left sided weakness likely d/t TIA  MRI head: no acute changes  lipitor 80mg HS (takes 10 crestor at home)  start asa 81mg / protonix po  Started plavix  appreciate neuro input  Patient walking in the hallway without any difficulties, no need for PT eval  Left ICA stenosis-incidental finding.   Vascular consult appreciated. Outpatient follow up  Check 2 D echo  Keep on tele-r/o A Fib    Left ICA stenosis on CTA neck  vascular consult  statin      Nicotine dependence  smoking cessation education/counseling   started nicotine patch    CAD/hx CABG  chest pain free  con't home meds: statin/BB

## 2018-11-21 NOTE — CONSULT NOTE ADULT - ASSESSMENT
TIA. His symptoms have resolved since admission.  He has high-grade carotid artery stenosis.  History of coronary artery disease status post CABG 2008 no recent angina or congestive heart failure. He has had coronary artery stents also in the past.  History of aortic abdominal aneurysm status post endovascular repair in the past.  Hypertension.  High cholesterol.  History of smoking.  Noncompliance with diet and medications and visits.  Suggest  Observe on telemetry.  Continue with statins aspirin and Plavix.  Advised patient to be compliant with his medications and diet and stop smoking.  Discussed with hospitalist & family .

## 2018-11-21 NOTE — PROGRESS NOTE ADULT - ASSESSMENT
63 year old male with presentation compatible with right hemispheric TIA.   He has no significant MAHIN stenosis.   Of note, he has high grade left ICA stenosis which appears to be asymptomatic (although patient had unexplained right CNIII weakness)    -Will discuss further with neurology, if in agreement that Left ICA stenosis is asymptomatic, will plan for CEA as an outpatient. however of there is concern that his high grade stenosis is contributory to his symptoms then I will recommend CEA on this admission  -High dose statins  -Continue ASA  -Counselled on smoking cessation  -Will follow 63 year old male with presentation compatible with right hemispheric TIA.   He has no significant MAHIN stenosis.   Of note, he has high grade left ICA stenosis which appears to be asymptomatic (although patient had unexplained right CNIII weakness)    -Will discuss further with neurology, if in agreement that Left ICA stenosis is asymptomatic, will plan for CEA as an outpatient. however of there is concern that his high grade stenosis is contributory to his symptoms then I will recommend CEA on this admission  -Cardiology consult for risk assessment for CEA  -High dose statins  -Continue ASA  -Counselled on smoking cessation  -Will follow

## 2018-11-21 NOTE — CONSULT NOTE ADULT - SUBJECTIVE AND OBJECTIVE BOX
Patient is a 63y old  Male who presents with a chief complaint of left sided weakness .      HPI:  Patient is 63-year-old with history of hypertension, high cholesterol, coronary artery disease status post CABG 2009, history of ischemic cardiomyopathy left ventricular ejection fraction 40% in the past, he has a history of aortic abdominal aneurysm status post endovascular repair in the past, he also has had a PCI of LAD after the CABG in 2009 and repeat cardiac catheterization 2010 all his grafts were patent. Patient denies any exertion chest pain, shortness of breath or palpitation. He was admitted with complains of left-sided weakness, left facial droop and diplopia for 10-12 hours. After admission he was diagnosed to have TIA and he has a high-grade carotid artery stenosis. Patient has been noncompliant with his diet and medications and visits. He has not taken his medications for the past few years. He also smokes. Currently his symptoms have resolved and now he is asymptomatic. He is in normal sinus rhythm on telemetry.       US Duplex Carotid Arteries Complete, Bilateral     IMPRESSION:  High-grade (greater than 70%) stenosis left internal carotid artery by   velocity measurements compatible with CTA findings    Measurement of carotid stenosis is based on velocity parameters that   correlate the residual internal carotid diameter with that of the more   distal vessel in accordance with a method such as the [place] American   Symptomatic Carotid Endarterectomy Trial (NASCET).      CT Angio Neck w/ IV Cont (11.20.18   IMPRESSION:    CTA HEAD:  No significant stenosis, occlusion, or aneurysm.    CTA NECK:  Moderate soft/calcified plaque within the left carotid bulb/origin of the   left ICA resulting in hairline/critical (90-99%) stenosis of the origin   of the left ICA.    Mild soft/calcified plaque within the right carotid bulb/origin of the   right ICA resulting in mild (less than 50%) stenosis of the origin of the   right ICA.    The bilateral vertebral arteries are normal.         Transthoracic Echocardiogram w/ Doppler (12.18.09   Conclusions:  1. Normal mitral valve. Minimal mitral regurgitation.  2. Calcified trileaflet aortic valve with normal opening.  No  aortic valve regurgitation seen.  3. Mild left atrial enlargement.  4. Normal left ventricular internal dimensions and wall  thicknesses.  5. Mild segmental left ventricular dysfunction. EF=45-50%.  The septum is severely hypokinetic.  6. Normal right ventricular size and systolic function.  ***Compared with intra-operative GABRIELA performed earlier  today, overall LV function has improved.       Xray Chest 1 View AP/PA. (11.20.18   FINDINGS/   IMPRESSION:  The lungs are clear without focal consolidation, large volume pleural   effusion, or overt pulmonary edema.  No pneumothorax.    Cardiomediastinal contours are within normal limits. Status post median   sternotomy.            PAST MEDICAL & SURGICAL HISTORY:  Hyperlipidemia  AAA (Abdominal Aortic Aneurysm): Dx. 5/2009- attempted repair  12/2009 surgery aborted due to hypotension.r  HTN (Hypertension), Benign  ADD (Attention Deficit Disorder)  CAD (Coronary Artery Disease)  S/P CABG (Coronary Artery Bypass Graft): 5/2009 x 4 vessels  right hernia repair  S/P Tonsillectomy            MEDICATIONS  (STANDING):  aspirin enteric coated 81 milliGRAM(s) Oral daily  atorvastatin 80 milliGRAM(s) Oral at bedtime  enoxaparin Injectable 40 milliGRAM(s) SubCutaneous every 24 hours  metoprolol succinate ER 25 milliGRAM(s) Oral at bedtime  nicotine -  14 mG/24Hr(s) Patch 1 Patch Transdermal daily  pantoprazole    Tablet 40 milliGRAM(s) Oral before breakfast    MEDICATIONS  (PRN):      FAMILY HISTORY:  Family history of myocardial infarction (Grandparent)      SOCIAL HISTORY: he has a history of smoking but denies any alcohol consumption.    REVIEW OF SYSTEM:  Pertinent items are noted in HPI.  Constitutional	Negative for chills, fevers, sweats.    Eyes: 	Negative for visual disturbance.  Ears, nose, mouth, throat, and face: Negative for epistaxis, nasal congestion, sore throat and tinnitus.  Neck:	Negative for enlargement, pain and difficulty in swallowing  Respiration : Negative for cough, dyspnea on exertion, pleuritic chest pain and wheezing  Cardiovascular: Negative for chest pain, dyspnea and palpitations    Gastrointestinal : Negative for abdominal pain, diarrhea, nausea and vomiting  Genitourinary: Negative for dysuria, frequency and urinary incontinence .  Skin: Negative for  rash, pruritus, swelling, dryness .  	  Hematologic/lymphatic: Negative for bleeding and easy bruising  Musculoskeletal: Negative for arthralgias, back pain and muscle weakness.  Neurological: Negative for , headaches, seizures and tremors . he had left facial droop, dizziness & diplopia.  Behavioral/Psych: Negative for mood change, depression.  Endocrine:	Negative for blurry vision, polydipsia and polyuria, diaphoresis.   Allergic/Immunologic:	Negative for anaphylaxis, angioedema and urticaria.      Vital Signs Last 24 Hrs  T(C): 36.2 (21 Nov 2018 04:50), Max: 37.1 (20 Nov 2018 12:31)  T(F): 97.2 (21 Nov 2018 04:50), Max: 98.8 (20 Nov 2018 12:31)  HR: 57 (21 Nov 2018 04:50) (57 - 75)  BP: 122/62 (20 Nov 2018 21:02) (111/70 - 145/66)  BP(mean): --  RR: 18 (20 Nov 2018 21:02) (16 - 19)  SpO2: 97% (21 Nov 2018 04:50) (97% - 99%)    I&O's Summary    PHYSICAL EXAM  General Appearance: cooperative, no acute distress,   HEENT: PERRL, conjunctiva clear, EOM's intact .  Neck: Supple, , no adenopathy, thyroid: not enlarged, no carotid bruit or JVD  Lungs: Clear to auscultation bilateral,no adventitious breath sounds, normal   expiratory phase  Heart: Regular rate and rhythm, S1, S2 normal, no murmur, rub or gallop  Abdomen: Soft, non-tender, bowel sounds active , no hepatosplenomegaly  Extremities: no cyanosis or edema, no joint swelling  Skin: Skin color, texture normal, no rashes   Neurologic: Alert and oriented X3 , cranial nerves intact, sensory and motor normal,        INTERPRETATION OF TELEMETRY: NSR       ECG: NSR Old anterior wall MI.        LABS:                          13.9   9.67  )-----------( 220      ( 20 Nov 2018 08:48 )             41.9     11-21    146<H>  |  115<H>  |  12  ----------------------------<  107<H>  3.8   |  24  |  0.70    Ca    9.7      21 Nov 2018 06:58    TPro  6.2  /  Alb  3.2<L>  /  TBili  0.5  /  DBili  0.1  /  AST  16  /  ALT  21  /  AlkPhos  62  11-21    CARDIAC MARKERS ( 20 Nov 2018 08:48 )  <0.015 ng/mL / x     / 129 U/L / x     / x              PT/INR - ( 20 Nov 2018 08:48 )   PT: 12.1 sec;   INR: 1.09 ratio         PTT - ( 20 Nov 2018 08:48 )  PTT:35.6 sec

## 2018-11-21 NOTE — PROGRESS NOTE ADULT - SUBJECTIVE AND OBJECTIVE BOX
Interval History: He is feeling better today. He has no difficulty standing, walking.  His diplopia has resolved.     MEDICATIONS  (STANDING):  aspirin enteric coated 81 milliGRAM(s) Oral daily  atorvastatin 80 milliGRAM(s) Oral at bedtime  clopidogrel Tablet 75 milliGRAM(s) Oral daily  enoxaparin Injectable 40 milliGRAM(s) SubCutaneous every 24 hours  metoprolol succinate ER 25 milliGRAM(s) Oral at bedtime  nicotine -  14 mG/24Hr(s) Patch 1 Patch Transdermal daily  pantoprazole    Tablet 40 milliGRAM(s) Oral before breakfast    MEDICATIONS  (PRN):      Allergies    No Known Allergies    Intolerances        PHYSICAL EXAM:  Vital Signs Last 24 Hrs  T(F): 97.9 (11-21-18 @ 11:18)  HR: 61 (11-21-18 @ 11:18)  BP: 119/59 (11-21-18 @ 11:18)  RR: 17 (11-21-18 @ 11:18)    GENERAL: NAD, well-groomed, well-developed  HEAD:  Atraumatic, Normocephalic  Neuro:  Awake, alert, no aphasia  CN: PERRL, EOMI, no nystagmus, no facial weakness, tongue protrudes in the midline  motor: normal tone, no pronator drift, full strength in all four extremities  sensory: intact to light touch  coordination: finger to nose intact bilaterally  gait: narrow based, steady    LABS:                        13.9   9.67  )-----------( 220      ( 20 Nov 2018 08:48 )             41.9     11-21    146<H>  |  115<H>  |  12  ----------------------------<  107<H>  3.8   |  24  |  0.70    Ca    9.7      21 Nov 2018 06:58    TPro  6.2  /  Alb  3.2<L>  /  TBili  0.5  /  DBili  0.1  /  AST  16  /  ALT  21  /  AlkPhos  62  11-21    PT/INR - ( 20 Nov 2018 08:48 )   PT: 12.1 sec;   INR: 1.09 ratio         PTT - ( 20 Nov 2018 08:48 )  PTT:35.6 sec      RADIOLOGY & ADDITIONAL STUDIES:    CT brain 11/20/18:    No acute intracranial hemorrhage, mass effect, or midline shift.      CTA head and neck 11/20/18:    CTA HEAD:  No significant stenosis, occlusion, or aneurysm.    CTA NECK:  Moderate soft/calcified plaque within the left carotid bulb/origin of the   left ICA resulting in hairline/critical (90-99%) stenosis of the origin   of the left ICA.    Mild soft/calcified plaque within the right carotid bulb/origin of the   right ICA resulting in mild (less than 50%) stenosis of the origin of the   right ICA.    The bilateral vertebral arteries are normal.    MR head 11/20/18:     IMPRESSION:    No acute infarct, intracranial hemorrhage, mass effect or midline shift.  No acute intracranial pathology on this noncontrast brain MRI.    The bilateral CN III demonstrate normal course and signal, without mass   effect.

## 2018-11-22 ENCOUNTER — TRANSCRIPTION ENCOUNTER (OUTPATIENT)
Age: 63
End: 2018-11-22

## 2018-11-22 VITALS
TEMPERATURE: 98 F | OXYGEN SATURATION: 98 % | SYSTOLIC BLOOD PRESSURE: 145 MMHG | DIASTOLIC BLOOD PRESSURE: 65 MMHG | HEART RATE: 70 BPM

## 2018-11-22 LAB
CHOLEST SERPL-MCNC: 136 MG/DL — SIGNIFICANT CHANGE UP (ref 10–199)
HDLC SERPL-MCNC: 38 MG/DL — LOW
LIPID PNL WITH DIRECT LDL SERPL: 72 MG/DL — SIGNIFICANT CHANGE UP
TOTAL CHOLESTEROL/HDL RATIO MEASUREMENT: 3.6 RATIO — SIGNIFICANT CHANGE UP (ref 3.4–9.6)
TRIGL SERPL-MCNC: 130 MG/DL — SIGNIFICANT CHANGE UP (ref 10–149)

## 2018-11-22 PROCEDURE — 99232 SBSQ HOSP IP/OBS MODERATE 35: CPT

## 2018-11-22 RX ORDER — CLOPIDOGREL BISULFATE 75 MG/1
1 TABLET, FILM COATED ORAL
Qty: 30 | Refills: 0 | OUTPATIENT
Start: 2018-11-22 | End: 2018-12-21

## 2018-11-22 RX ORDER — TOPIRAMATE 25 MG
50 TABLET ORAL DAILY
Qty: 0 | Refills: 0 | Status: DISCONTINUED | OUTPATIENT
Start: 2018-11-22 | End: 2018-11-22

## 2018-11-22 RX ORDER — ASPIRIN/CALCIUM CARB/MAGNESIUM 324 MG
1 TABLET ORAL
Qty: 0 | Refills: 0 | COMMUNITY
Start: 2018-11-22

## 2018-11-22 RX ADMIN — PANTOPRAZOLE SODIUM 40 MILLIGRAM(S): 20 TABLET, DELAYED RELEASE ORAL at 04:57

## 2018-11-22 RX ADMIN — CLOPIDOGREL BISULFATE 75 MILLIGRAM(S): 75 TABLET, FILM COATED ORAL at 11:47

## 2018-11-22 RX ADMIN — Medication 1 PATCH: at 11:48

## 2018-11-22 RX ADMIN — Medication 81 MILLIGRAM(S): at 11:47

## 2018-11-22 RX ADMIN — Medication 50 MILLIGRAM(S): at 12:57

## 2018-11-22 NOTE — PROGRESS NOTE ADULT - SUBJECTIVE AND OBJECTIVE BOX
Interval History:  11/21/18: Patient feels better. Diplopia has resolved.    11/22/18: He has no neurological complaints at this time.     MEDICATIONS  (STANDING):  aspirin enteric coated 81 milliGRAM(s) Oral daily  atorvastatin 80 milliGRAM(s) Oral at bedtime  clopidogrel Tablet 75 milliGRAM(s) Oral daily  enoxaparin Injectable 40 milliGRAM(s) SubCutaneous every 24 hours  metoprolol succinate ER 25 milliGRAM(s) Oral at bedtime  nicotine -  14 mG/24Hr(s) Patch 1 Patch Transdermal daily  pantoprazole    Tablet 40 milliGRAM(s) Oral before breakfast    MEDICATIONS  (PRN):      Allergies    No Known Allergies    Intolerances        PHYSICAL EXAM:  Vital Signs Last 24 Hrs  T(F): 98 (11-22-18 @ 10:20)  HR: 70 (11-22-18 @ 10:20)  BP: 145/65 (11-22-18 @ 10:20)  RR: 17 (11-21-18 @ 11:18)    GENERAL: NAD, well-groomed, well-developed  HEAD:  Atraumatic, Normocephalic  Neuro:  Awake, alert, no aphasia  CN: PERRL, EOMI, no nystagmus, no facial weakness, tongue protrudes in the midline  motor: normal tone, no pronator drift, full strength in all four extremities  sensory: intact to light touch  coordination: finger to nose intact bilaterally  DTRs: symmetric, plantar responses flexor bilaterally    LABS:    11-21    146<H>  |  115<H>  |  12  ----------------------------<  107<H>  3.8   |  24  |  0.70    Ca    9.7      21 Nov 2018 06:58    TPro  6.2  /  Alb  3.2<L>  /  TBili  0.5  /  DBili  0.1  /  AST  16  /  ALT  21  /  AlkPhos  62  11-21          RADIOLOGY & ADDITIONAL STUDIES:    CT brain 11/20/18:    No acute intracranial hemorrhage, mass effect, or midline shift.      CTA head and neck 11/20/18:    CTA HEAD:  No significant stenosis, occlusion, or aneurysm.    CTA NECK:  Moderate soft/calcified plaque within the left carotid bulb/origin of the   left ICA resulting in hairline/critical (90-99%) stenosis of the origin   of the left ICA.    Mild soft/calcified plaque within the right carotid bulb/origin of the   right ICA resulting in mild (less than 50%) stenosis of the origin of the   right ICA.    The bilateral vertebral arteries are normal.    MR head 11/20/18:     IMPRESSION:    No acute infarct, intracranial hemorrhage, mass effect or midline shift.  No acute intracranial pathology on this noncontrast brain MRI.    The bilateral CN III demonstrate normal course and signal, without mass   effect.

## 2018-11-22 NOTE — PROGRESS NOTE ADULT - ASSESSMENT
TIA. His symptoms have resolved since admission.  He has high-grade carotid artery stenosis.  History of coronary artery disease status post CABG 2008 no recent angina or congestive heart failure. He has had coronary artery stents also in the past.  History of aortic abdominal aneurysm status post endovascular repair in the past.  Hypertension.  High cholesterol.  History of smoking.  Noncompliance with diet and medications and visits.  Suggest  Observe on telemetry.  Continue with statins aspirin and Plavix.  Advised patient to be compliant with his medications and diet and stop smoking.  Due to TIA and critical carotid artery stenosis, There is no absolute contraindication from cardiac standpoint view for CEA. All risks options discussed with the patient. Given his multiple comorbid conditions he certainly at a moderate risk. Discussed with vascular surgeon and hospitalist.  Discussed with  family

## 2018-11-22 NOTE — PROGRESS NOTE ADULT - SUBJECTIVE AND OBJECTIVE BOX
HPI:  Patient is 63-year-old with history of hypertension, high cholesterol, coronary artery disease status post CABG 2009, history of ischemic cardiomyopathy left ventricular ejection fraction 40% in the past, he has a history of aortic abdominal aneurysm status post endovascular repair in the past, he also has had a PCI of LAD after the CABG in 2009 and repeat cardiac catheterization 2010 all his grafts were patent. Patient denies any exertion chest pain, shortness of breath or palpitation. He was admitted with complains of left-sided weakness, left facial droop and diplopia for 10-12 hours. After admission he was diagnosed to have TIA and he has a high-grade carotid artery stenosis. Patient has been noncompliant with his diet and medications and visits. He has not taken his medications for the past few years. He also smokes. Currently his symptoms have resolved and now he is asymptomatic. He is in normal sinus rhythm on telemetry. Patient states he has been very active prior to his admission. He walks almost 2 miles daily without an discomfort.       Transthoracic Echocardiogram w/ Doppler (12.18.09   Conclusions:  1. Normal mitral valve. Minimal mitral regurgitation.  2. Calcified trileaflet aortic valve with normal opening.  No  aortic valve regurgitation seen.  3. Mild left atrial enlargement.  4. Normal left ventricular internal dimensions and wall  thicknesses.  5. Mild segmental left ventricular dysfunction. EF=45-50%.  The septum is severely hypokinetic.  6. Normal right ventricular size and systolic function.        PAST MEDICAL & SURGICAL HISTORY:  Hyperlipidemia  AAA (Abdominal Aortic Aneurysm): Dx. 5/2009- attempted repair  12/2009 surgery aborted due to hypotension.r  HTN (Hypertension).   ADD (Attention Deficit Disorder)  S/P CABG (Coronary Artery Bypass Graft): 5/2009 x 4 vessels  right hernia repair  S/P Tonsillectomy          MEDICATIONS  (STANDING):  aspirin enteric coated 81 milliGRAM(s) Oral daily  atorvastatin 80 milliGRAM(s) Oral at bedtime  clopidogrel Tablet 75 milliGRAM(s) Oral daily  enoxaparin Injectable 40 milliGRAM(s) SubCutaneous every 24 hours  metoprolol succinate ER 25 milliGRAM(s) Oral at bedtime  nicotine -  14 mG/24Hr(s) Patch 1 Patch Transdermal daily  pantoprazole    Tablet 40 milliGRAM(s) Oral before breakfast      REVIEW OF SYSTEM:  Pertinent items are noted in HPI.  Constitutional	Negative for chills, fevers, sweats.    Eyes: 	Negative for visual disturbance.  Ears, nose, mouth, throat, and face: Negative for epistaxis, nasal congestion, sore throat and tinnitus.  Neck:	Negative for enlargement, pain and difficulty in swallowing  Respiration : Negative for cough, dyspnea on exertion, pleuritic chest pain and wheezing  Cardiovascular: Negative for chest pain, dyspnea and palpitations    Gastrointestinal : Negative for abdominal pain, diarrhea, nausea and vomiting  Genitourinary: Negative for dysuria, frequency and urinary incontinence .  Skin: Negative for  rash, pruritus, swelling, dryness .  	  Hematologic/lymphatic: Negative for bleeding and easy bruising  Musculoskeletal: Negative for arthralgias, back pain and muscle weakness.  Neurological: Negative for dizziness, headaches, seizures and tremors  Behavioral/Psych: Negative for mood change, depression.  Endocrine:	Negative for blurry vision, polydipsia and polyuria, diaphoresis.   Allergic/Immunologic:	Negative for anaphylaxis, angioedema and urticaria.      Vital Signs Last 24 Hrs  T(C): 36.7 (22 Nov 2018 10:20), Max: 36.8 (21 Nov 2018 16:21)  T(F): 98 (22 Nov 2018 10:20), Max: 98.2 (21 Nov 2018 16:21)  HR: 70 (22 Nov 2018 10:20) (60 - 70)  BP: 145/65 (22 Nov 2018 10:20) (114/50 - 145/65)  BP(mean): --  RR: --  SpO2: 98% (22 Nov 2018 10:20) (96% - 98%)    I&O's Summary    21 Nov 2018 07:01  -  22 Nov 2018 07:00  --------------------------------------------------------  IN: 240 mL / OUT: 0 mL / NET: 240 mL      PHYSICAL EXAM  General Appearance: cooperative, no acute distress,   HEENT: PERRL, conjunctiva clear, EOM's intact, non injected pharynx, no exudate, TM   normal  Neck: Supple, , no adenopathy, thyroid: not enlarged, no carotid bruit or JVD  Back: Symmetric, no  tenderness,no soft tissue tenderness  Lungs: Clear to auscultation bilateral,no adventitious breath sounds, normal   expiratory phase  Heart: Regular rate and rhythm, S1, S2 normal, no murmur, rub or gallop  Abdomen: Soft, non-tender, bowel sounds active , no hepatosplenomegaly  Extremities: no cyanosis or edema, no joint swelling  Skin: Skin color, texture normal, no rashes   Neurologic: Alert and oriented X3 , cranial nerves intact, sensory and motor normal,        INTERPRETATION OF TELEMETRY: NSR            LABS:      11-21    146<H>  |  115<H>  |  12  ----------------------------<  107<H>  3.8   |  24  |  0.70    Ca    9.7      21 Nov 2018 06:58    TPro  6.2  /  Alb  3.2<L>  /  TBili  0.5  /  DBili  0.1  /  AST  16  /  ALT  21  /  AlkPhos  62  11-21        Lipid Panel  136  38  72  130  Lipid Panel  124  36  70  91 HPI:  Patient is 63-year-old with history of hypertension, high cholesterol, coronary artery disease status post CABG 2009, history of ischemic cardiomyopathy left ventricular ejection fraction 40% in the past, he has a history of aortic abdominal aneurysm status post endovascular repair in the past, he also has had a PCI of LAD after the CABG in 2009 and repeat cardiac catheterization 2010 all his grafts were patent. Patient denies any exertion chest pain, shortness of breath or palpitation. He was admitted with complains of left-sided weakness, left facial droop and diplopia for 10-12 hours. After admission he was diagnosed to have TIA and he has a high-grade carotid artery stenosis. Patient has been noncompliant with his diet and medications and visits. He has not taken his medications for the past few years. He also smokes. Currently his symptoms have resolved and now he is asymptomatic. He is in normal sinus rhythm on telemetry. Patient states he has been very active prior to his admission. He walks almost 2 miles daily without an discomfort.Patient is anxious to go home is ready to sign out against medical advice if not discharged. He promises to be compliant with his medications and diet and he states he will not smoke any further.       Transthoracic Echocardiogram w/ Doppler (12.18.09   Conclusions:  1. Normal mitral valve. Minimal mitral regurgitation.  2. Calcified trileaflet aortic valve with normal opening.  No  aortic valve regurgitation seen.  3. Mild left atrial enlargement.  4. Normal left ventricular internal dimensions and wall  thicknesses.  5. Mild segmental left ventricular dysfunction. EF=45-50%.  The septum is severely hypokinetic.  6. Normal right ventricular size and systolic function.        PAST MEDICAL & SURGICAL HISTORY:  Hyperlipidemia  AAA (Abdominal Aortic Aneurysm): Dx. 5/2009- attempted repair  12/2009 surgery aborted due to hypotension.r  HTN (Hypertension).   ADD (Attention Deficit Disorder)  S/P CABG (Coronary Artery Bypass Graft): 5/2009 x 4 vessels  right hernia repair  S/P Tonsillectomy          MEDICATIONS  (STANDING):  aspirin enteric coated 81 milliGRAM(s) Oral daily  atorvastatin 80 milliGRAM(s) Oral at bedtime  clopidogrel Tablet 75 milliGRAM(s) Oral daily  enoxaparin Injectable 40 milliGRAM(s) SubCutaneous every 24 hours  metoprolol succinate ER 25 milliGRAM(s) Oral at bedtime  nicotine -  14 mG/24Hr(s) Patch 1 Patch Transdermal daily  pantoprazole    Tablet 40 milliGRAM(s) Oral before breakfast      REVIEW OF SYSTEM:  Pertinent items are noted in HPI.  Constitutional	Negative for chills, fevers, sweats.    Eyes: 	Negative for visual disturbance.  Ears, nose, mouth, throat, and face: Negative for epistaxis, nasal congestion, sore throat and tinnitus.  Neck:	Negative for enlargement, pain and difficulty in swallowing  Respiration : Negative for cough, dyspnea on exertion, pleuritic chest pain and wheezing  Cardiovascular: Negative for chest pain, dyspnea and palpitations    Gastrointestinal : Negative for abdominal pain, diarrhea, nausea and vomiting  Genitourinary: Negative for dysuria, frequency and urinary incontinence .  Skin: Negative for  rash, pruritus, swelling, dryness .  	  Hematologic/lymphatic: Negative for bleeding and easy bruising  Musculoskeletal: Negative for arthralgias, back pain and muscle weakness.  Neurological: Negative for dizziness, headaches, seizures and tremors  Behavioral/Psych: Negative for mood change, depression.  Endocrine:	Negative for blurry vision, polydipsia and polyuria, diaphoresis.   Allergic/Immunologic:	Negative for anaphylaxis, angioedema and urticaria.      Vital Signs Last 24 Hrs  T(C): 36.7 (22 Nov 2018 10:20), Max: 36.8 (21 Nov 2018 16:21)  T(F): 98 (22 Nov 2018 10:20), Max: 98.2 (21 Nov 2018 16:21)  HR: 70 (22 Nov 2018 10:20) (60 - 70)  BP: 145/65 (22 Nov 2018 10:20) (114/50 - 145/65)  BP(mean): --  RR: --  SpO2: 98% (22 Nov 2018 10:20) (96% - 98%)    I&O's Summary    21 Nov 2018 07:01  -  22 Nov 2018 07:00  --------------------------------------------------------  IN: 240 mL / OUT: 0 mL / NET: 240 mL      PHYSICAL EXAM  General Appearance: cooperative, no acute distress,   HEENT: PERRL, conjunctiva clear, EOM's intact, non injected pharynx, no exudate, TM   normal  Neck: Supple, , no adenopathy, thyroid: not enlarged, no carotid bruit or JVD  Back: Symmetric, no  tenderness,no soft tissue tenderness  Lungs: Clear to auscultation bilateral,no adventitious breath sounds, normal   expiratory phase  Heart: Regular rate and rhythm, S1, S2 normal, no murmur, rub or gallop  Abdomen: Soft, non-tender, bowel sounds active , no hepatosplenomegaly  Extremities: no cyanosis or edema, no joint swelling  Skin: Skin color, texture normal, no rashes   Neurologic: Alert and oriented X3 , cranial nerves intact, sensory and motor normal,        INTERPRETATION OF TELEMETRY: NSR            LABS:      11-21    146<H>  |  115<H>  |  12  ----------------------------<  107<H>  3.8   |  24  |  0.70    Ca    9.7      21 Nov 2018 06:58    TPro  6.2  /  Alb  3.2<L>  /  TBili  0.5  /  DBili  0.1  /  AST  16  /  ALT  21  /  AlkPhos  62  11-21        Lipid Panel  136  38  72  130  Lipid Panel  124  36  70  91

## 2018-11-22 NOTE — DISCHARGE NOTE ADULT - HOSPITAL COURSE
63 year old Man presented to ED c/o a strange feeling on his left side, unsteady gait, and double vision. The patient states he went to bed at 11:30PM and was feeling fine, he states he got up for a drink of water at 1:30 and was feeing fine, and at 6:45am his alarm went off at that time he states his left sided felt "funny" when he got up to walk he fell towards his left side, he felt unbalanced but denies dizziness. Pt with pmhx HTN, hyperlipidemia, endograft AAA repair 2/2010, CAD with 4V CABG 6/2009 with subsequent PTCA at [facility].    In the ED Code Stroke was called at 8:27 (he arrived at 8:22) CT head  negative for any acute stroke or hemorrhage the CTA shows minimal flow through the LEFT ICA, ED exam he was found to have partial 3rd nerve palsy in the right along with a slight left facial, double vision and unsteady on his feet NIHSS 1- he was deemed an unlikely candidate for t-PA as his last known well was outside the time window.       Hospital Course:       11/21/18: Patient seen and examined. No more double vision. Feels better. Discussed with patient regarding management and d/c plan. Discussed with Dr Durant and vascular.   11/21/18: Patient seen and examined. Feels much better. Discussed with Dr Durant, Echo as an outpatient tomorrow.     Vital Signs Last 24 Hrs  T(C): 36.7 (22 Nov 2018 10:20), Max: 36.8 (21 Nov 2018 16:21)  T(F): 98 (22 Nov 2018 10:20), Max: 98.2 (21 Nov 2018 16:21)  HR: 70 (22 Nov 2018 10:20) (60 - 70)  BP: 145/65 (22 Nov 2018 10:20) (114/50 - 145/65)  BP(mean): --  RR: --  SpO2: 98% (22 Nov 2018 10:20) (96% - 98%)      PHYSICAL EXAM:    	GENERAL: NAD    	HEENT:  pupils round equal and reactive, mild left ptosis no oropharyngeal lesions, erythema, exudates, oral thrush    	NECK:   supple, no carotid bruits, no palpable lymph nodes, no thyromegaly    	CV:  +S1, +S2, regular,  no m/c/r    	RESP:  scattered fine inspiratory wheezing otherwise lungs clear to auscultation bilaterally, no rales, rhonchi, good air entry bilaterally    	BREAST:  not examined    	GI:  abdomen soft, non-tender, non-distended, normal BS, no bruits, no abdominal masses, no palpable masses    	RECTAL:  not examined    	:  no suprapubic tenderness    	MSK: normal muscle tone, no atrophy, no rigidity, no contractions    	EXT: chronic vascular changes to BLE,  no clubbing, no cyanosis, no edema, no calf pain, swelling or erythema    	VASCULAR:  pulses equal and symmetric in the upper and lower extremities    	NEURO:  AAOX3, speech clear, mild left ptosis. no pronation drift, lifts and resist BUE/BLE strongly 5/5, tongue midline, no clr facial droop identified (wife at bedside reports improvement since onset of symptoms), follows all commands, able to move extremities spontaneously    	SKIN:  no ulcers, lesions or rashes.      Assessment and Plan:   Assessment:  · Assessment		  Left sided weakness likely d/t TIA  MRI head: no acute changes  Continue statin  start asa 81mg   Started plavix  appreciate neuro input  Patient walking in the hallway without any difficulties, no need for PT eval  Left ICA stenosis-incidental finding.   Vascular consult appreciated. Outpatient follow up  Check 2 D echo    Left ICA stenosis on CTA neck  vascular consult appreciated. Follow up with Dr Burciaga as an outpatient  statin      Nicotine dependence  smoking cessation education/counseling     CAD/hx CABG  con't home meds: statin/BB      Home today  PMD was notified.  Spent more than 30 minutes to prepare the discharge.

## 2018-11-22 NOTE — DISCHARGE NOTE ADULT - CARE PROVIDER_API CALL
Cale Durant (MD), Cardiovascular Disease; Internal Medicine  [address] [place] [address]  Phone: [phone]  Fax: [phone]    Sonu uQigley (MD), Internal Medicine  [address] [place] [address]  Phone: [phone]  Fax: [phone]    Jonh Burciaga), Surgery  [address] [place] [address]  Phone: [phone]  Fax: [phone]

## 2018-11-22 NOTE — PROGRESS NOTE ADULT - ATTENDING COMMENTS
63 year old male with right hemispheric TIA and asymptomatic high grade LICA stenosis  -OK to discharge home today  -Will follow up with DR Durant in office for Echo/stress test  -Continue DAPT  -Will plan for Left CEA as an outpatient

## 2018-11-22 NOTE — PROGRESS NOTE ADULT - REASON FOR ADMISSION
left sided weakness

## 2018-11-22 NOTE — PROGRESS NOTE ADULT - ASSESSMENT
A/P 63M with TIA left sided symptoms and U/S findings of carotid stenosis      -Continue to recommend antiplatelet therapy and statin  -Smoking cessation advised  -Continues be candidate for CEA tentatively on this admission vs elective, pending family discussion  -Still awaiting cardiology clearance for tentative procedure  -Will follow up read for ECHO that was ordered    Case discussed with Dr Burciaga A/P 63M with TIA left sided symptoms and U/S findings of carotid stenosis      -Continue to recommend antiplatelet therapy and statin  -Smoking cessation advised  -Continues be candidate for CEA  -Still awaiting cardiology clearance for tentative procedure  -Will follow up read for ECHO that was ordered    Case discussed with Dr Burciaga

## 2018-11-22 NOTE — PROGRESS NOTE ADULT - ASSESSMENT
Mr. Angel is a 63 year old man who presented with diplopia and falling to the left when walking.   He had an incidental finding of severe stenosis of the left ICA, but most of his symptoms for this episode are related to the right hemisphere.  MRI brain does not show any acute infarct.  Likely TIA although Lyme and ACE levels sent to look for other causes of cranial neuropathies.  Continue aspirin + plavix.  Continue statin.  Awaiting decision regarding whether left CEA will be performed on this admission or in about two weeks.   Smoking cessation. Continue nicotine patch for now.

## 2018-11-22 NOTE — DISCHARGE NOTE ADULT - CARE PLAN
Principal Discharge DX:	Cerebrovascular accident (CVA), unspecified mechanism  Goal:	prevent further attack  Assessment and plan of treatment:	Follow up with Dr Durant tomorrow for Echo

## 2018-11-22 NOTE — DISCHARGE NOTE ADULT - MEDICATION SUMMARY - MEDICATIONS TO TAKE
I will START or STAY ON the medications listed below when I get home from the hospital:    aspirin 81 mg oral delayed release tablet  -- 1 tab(s) by mouth once a day  -- Indication: For TIA    Crestor  -- 10 milligram(s) by mouth once a day (at bedtime)  -- Indication: For TIA    clopidogrel 75 mg oral tablet  -- 1 tab(s) by mouth once a day  -- Indication: For CELEBRAL VASCULAR ACCIDENT    Metoprolol Succinate ER 25 mg oral tablet, extended release  -- 1 tab(s) by mouth once a day (at bedtime)  -- Indication: For HTN

## 2018-11-22 NOTE — DISCHARGE NOTE ADULT - PATIENT PORTAL LINK FT
You can access the [facility] Patient Portal, offered by [facility], by registering with the following website: [email or web address]

## 2018-11-22 NOTE — PROGRESS NOTE ADULT - SUBJECTIVE AND OBJECTIVE BOX
Pt was seen as morning. Pt had no acute events overnight. Was resting comfortably this morning.    MEDICATIONS  (STANDING):  aspirin enteric coated 81 milliGRAM(s) Oral daily  atorvastatin 80 milliGRAM(s) Oral at bedtime  clopidogrel Tablet 75 milliGRAM(s) Oral daily  enoxaparin Injectable 40 milliGRAM(s) SubCutaneous every 24 hours  metoprolol succinate ER 25 milliGRAM(s) Oral at bedtime  nicotine -  14 mG/24Hr(s) Patch 1 Patch Transdermal daily  pantoprazole    Tablet 40 milliGRAM(s) Oral before breakfast    MEDICATIONS  (PRN):    Vital Signs Last 24 Hrs  T(C): 36.3 (22 Nov 2018 04:54), Max: 36.8 (21 Nov 2018 16:21)  T(F): 97.4 (22 Nov 2018 04:54), Max: 98.2 (21 Nov 2018 16:21)  HR: 60 (22 Nov 2018 04:54) (60 - 70)  BP: 121/61 (22 Nov 2018 04:54) (114/50 - 121/61)  BP(mean): --  RR: 17 (21 Nov 2018 11:18) (17 - 17)  SpO2: 96% (22 Nov 2018 04:54) (96% - 97%)    labs              x                    146<H>| 24   | 12           x     >-----------< x       ------------------------< 107<H>                 x                    3.8  | 115<H>| 0.70                                                                      Ca 9.7   Mg x     Ph x

## 2018-11-26 LAB — ACE SERPL-CCNC: 27 U/L — SIGNIFICANT CHANGE UP (ref 14–82)

## 2018-11-27 DIAGNOSIS — I10 ESSENTIAL (PRIMARY) HYPERTENSION: ICD-10-CM

## 2018-11-27 DIAGNOSIS — E78.5 HYPERLIPIDEMIA, UNSPECIFIED: ICD-10-CM

## 2018-11-27 DIAGNOSIS — Z91.11 PATIENT'S NONCOMPLIANCE WITH DIETARY REGIMEN: ICD-10-CM

## 2018-11-27 DIAGNOSIS — F17.210 NICOTINE DEPENDENCE, CIGARETTES, UNCOMPLICATED: ICD-10-CM

## 2018-11-27 DIAGNOSIS — R53.1 WEAKNESS: ICD-10-CM

## 2018-11-27 DIAGNOSIS — I25.10 ATHEROSCLEROTIC HEART DISEASE OF NATIVE CORONARY ARTERY WITHOUT ANGINA PECTORIS: ICD-10-CM

## 2018-11-27 DIAGNOSIS — Z95.5 PRESENCE OF CORONARY ANGIOPLASTY IMPLANT AND GRAFT: ICD-10-CM

## 2018-11-27 DIAGNOSIS — Z91.14 PATIENT'S OTHER NONCOMPLIANCE WITH MEDICATION REGIMEN: ICD-10-CM

## 2018-11-27 DIAGNOSIS — G81.94 HEMIPLEGIA, UNSPECIFIED AFFECTING LEFT NONDOMINANT SIDE: ICD-10-CM

## 2018-11-27 DIAGNOSIS — G45.9 TRANSIENT CEREBRAL ISCHEMIC ATTACK, UNSPECIFIED: ICD-10-CM

## 2018-12-04 ENCOUNTER — APPOINTMENT (OUTPATIENT)
Dept: CARDIOLOGY | Facility: CLINIC | Age: 63
End: 2018-12-04
Payer: COMMERCIAL

## 2018-12-04 ENCOUNTER — NON-APPOINTMENT (OUTPATIENT)
Age: 63
End: 2018-12-04

## 2018-12-04 VITALS — SYSTOLIC BLOOD PRESSURE: 138 MMHG | DIASTOLIC BLOOD PRESSURE: 64 MMHG

## 2018-12-04 VITALS
SYSTOLIC BLOOD PRESSURE: 144 MMHG | DIASTOLIC BLOOD PRESSURE: 78 MMHG | HEIGHT: 67 IN | RESPIRATION RATE: 17 BRPM | OXYGEN SATURATION: 95 % | BODY MASS INDEX: 24.48 KG/M2 | HEART RATE: 66 BPM | WEIGHT: 156 LBS

## 2018-12-04 DIAGNOSIS — R94.39 ABNORMAL RESULT OF OTHER CARDIOVASCULAR FUNCTION STUDY: ICD-10-CM

## 2018-12-04 DIAGNOSIS — Z86.79 PERSONAL HISTORY OF OTHER DISEASES OF THE CIRCULATORY SYSTEM: ICD-10-CM

## 2018-12-04 DIAGNOSIS — I25.2 OLD MYOCARDIAL INFARCTION: ICD-10-CM

## 2018-12-04 DIAGNOSIS — Z78.9 OTHER SPECIFIED HEALTH STATUS: ICD-10-CM

## 2018-12-04 DIAGNOSIS — Z82.49 FAMILY HISTORY OF ISCHEMIC HEART DISEASE AND OTHER DISEASES OF THE CIRCULATORY SYSTEM: ICD-10-CM

## 2018-12-04 PROCEDURE — 99204 OFFICE O/P NEW MOD 45 MIN: CPT

## 2018-12-04 PROCEDURE — 93000 ELECTROCARDIOGRAM COMPLETE: CPT

## 2018-12-04 RX ORDER — ASPIRIN ENTERIC COATED TABLETS 81 MG 81 MG/1
81 TABLET, DELAYED RELEASE ORAL DAILY
Refills: 0 | Status: ACTIVE | COMMUNITY

## 2018-12-04 RX ORDER — CLOPIDOGREL 75 MG/1
75 TABLET, FILM COATED ORAL
Refills: 0 | Status: ACTIVE | COMMUNITY

## 2018-12-04 NOTE — DISCUSSION/SUMMARY
[FreeTextEntry1] : Patient with significant carotid disease. Planned for CEA. Now with abnormal nuclear stress test. Needs LHC first. If considered high risk after cardiac cath, will discuss with Dr. Burciaga regarding carotid stent. \par On aspirin and plavix. \par Now compliant with medications.

## 2018-12-04 NOTE — HISTORY OF PRESENT ILLNESS
[FreeTextEntry1] : Patient with history of CAD, 10 years prior 4x CABG, CVA, Carotid disease. 2009 CABG. \par Underwent AAA attempt repair and had cardiac complications. Underwent cardiac cath at [facility] at that time. \par TIA in the past month- left sided symtpoms. Double vision. Improved in 24 hours. Noted to have carotid disease in the left CEA.\par Now with nuclear stress test - abnormal. Intermediate risk.

## 2018-12-04 NOTE — PHYSICAL EXAM
[General Appearance - Well Developed] : well developed [Normal Conjunctiva] : the conjunctiva exhibited no abnormalities [Normal Oral Mucosa] : normal oral mucosa [Normal Jugular Venous V Waves Present] : normal jugular venous V waves present [Heart Rate And Rhythm] : heart rate and rhythm were normal [Heart Sounds] : normal S1 and S2 [] : no respiratory distress [Respiration, Rhythm And Depth] : normal respiratory rhythm and effort [Bowel Sounds] : normal bowel sounds [Abdomen Soft] : soft [FreeTextEntry1] : limited gait.  [Nail Clubbing] : no clubbing of the fingernails [Oriented To Time, Place, And Person] : oriented to person, place, and time

## 2018-12-10 ENCOUNTER — INPATIENT (INPATIENT)
Facility: HOSPITAL | Age: 63
LOS: 0 days | Discharge: HOME | DRG: 247 | End: 2018-12-11
Attending: INTERNAL MEDICINE | Admitting: INTERNAL MEDICINE
Payer: COMMERCIAL

## 2018-12-10 ENCOUNTER — TRANSCRIPTION ENCOUNTER (OUTPATIENT)
Age: 63
End: 2018-12-10

## 2018-12-10 VITALS
RESPIRATION RATE: 18 BRPM | DIASTOLIC BLOOD PRESSURE: 70 MMHG | HEART RATE: 55 BPM | OXYGEN SATURATION: 100 % | TEMPERATURE: 98 F | SYSTOLIC BLOOD PRESSURE: 151 MMHG

## 2018-12-10 DIAGNOSIS — R94.39 ABNORMAL RESULT OF OTHER CARDIOVASCULAR FUNCTION STUDY: ICD-10-CM

## 2018-12-10 LAB
ALBUMIN SERPL ELPH-MCNC: 4.2 G/DL — SIGNIFICANT CHANGE UP (ref 3.3–5.2)
ALP SERPL-CCNC: 66 U/L — SIGNIFICANT CHANGE UP (ref 40–120)
ALT FLD-CCNC: 17 U/L — SIGNIFICANT CHANGE UP
ANION GAP SERPL CALC-SCNC: 10 MMOL/L — SIGNIFICANT CHANGE UP (ref 5–17)
APTT BLD: 37.2 SEC — HIGH (ref 27.5–36.3)
AST SERPL-CCNC: 18 U/L — SIGNIFICANT CHANGE UP
BILIRUB SERPL-MCNC: 0.5 MG/DL — SIGNIFICANT CHANGE UP (ref 0.4–2)
BUN SERPL-MCNC: 13 MG/DL — SIGNIFICANT CHANGE UP (ref 8–20)
CALCIUM SERPL-MCNC: 10.6 MG/DL — HIGH (ref 8.6–10.2)
CHLORIDE SERPL-SCNC: 107 MMOL/L — SIGNIFICANT CHANGE UP (ref 98–107)
CO2 SERPL-SCNC: 26 MMOL/L — SIGNIFICANT CHANGE UP (ref 22–29)
CREAT SERPL-MCNC: 0.63 MG/DL — SIGNIFICANT CHANGE UP (ref 0.5–1.3)
GLUCOSE SERPL-MCNC: 106 MG/DL — SIGNIFICANT CHANGE UP (ref 70–115)
HCT VFR BLD CALC: 40.6 % — LOW (ref 42–52)
HGB BLD-MCNC: 13.5 G/DL — LOW (ref 14–18)
INR BLD: 1.08 RATIO — SIGNIFICANT CHANGE UP (ref 0.88–1.16)
MCHC RBC-ENTMCNC: 29.8 PG — SIGNIFICANT CHANGE UP (ref 27–31)
MCHC RBC-ENTMCNC: 33.3 G/DL — SIGNIFICANT CHANGE UP (ref 32–36)
MCV RBC AUTO: 89.6 FL — SIGNIFICANT CHANGE UP (ref 80–94)
PLATELET # BLD AUTO: 272 K/UL — SIGNIFICANT CHANGE UP (ref 150–400)
POTASSIUM SERPL-MCNC: 4 MMOL/L — SIGNIFICANT CHANGE UP (ref 3.5–5.3)
POTASSIUM SERPL-SCNC: 4 MMOL/L — SIGNIFICANT CHANGE UP (ref 3.5–5.3)
PROT SERPL-MCNC: 6.7 G/DL — SIGNIFICANT CHANGE UP (ref 6.6–8.7)
PROTHROM AB SERPL-ACNC: 12.5 SEC — SIGNIFICANT CHANGE UP (ref 10–12.9)
RBC # BLD: 4.53 M/UL — LOW (ref 4.6–6.2)
RBC # FLD: 14.9 % — SIGNIFICANT CHANGE UP (ref 11–15.6)
SODIUM SERPL-SCNC: 143 MMOL/L — SIGNIFICANT CHANGE UP (ref 135–145)
TROPONIN T SERPL-MCNC: <0.01 NG/ML — SIGNIFICANT CHANGE UP (ref 0–0.06)
WBC # BLD: 10.5 K/UL — SIGNIFICANT CHANGE UP (ref 4.8–10.8)
WBC # FLD AUTO: 10.5 K/UL — SIGNIFICANT CHANGE UP (ref 4.8–10.8)

## 2018-12-10 PROCEDURE — 93010 ELECTROCARDIOGRAM REPORT: CPT | Mod: 76

## 2018-12-10 RX ORDER — ATORVASTATIN CALCIUM 80 MG/1
40 TABLET, FILM COATED ORAL AT BEDTIME
Qty: 0 | Refills: 0 | Status: DISCONTINUED | OUTPATIENT
Start: 2018-12-10 | End: 2018-12-11

## 2018-12-10 RX ORDER — SODIUM CHLORIDE 9 MG/ML
1000 INJECTION INTRAMUSCULAR; INTRAVENOUS; SUBCUTANEOUS
Qty: 0 | Refills: 0 | Status: DISCONTINUED | OUTPATIENT
Start: 2018-12-10 | End: 2018-12-11

## 2018-12-10 RX ORDER — NICOTINE POLACRILEX 2 MG
1 GUM BUCCAL DAILY
Qty: 0 | Refills: 0 | Status: DISCONTINUED | OUTPATIENT
Start: 2018-12-10 | End: 2018-12-11

## 2018-12-10 RX ORDER — CLOPIDOGREL BISULFATE 75 MG/1
75 TABLET, FILM COATED ORAL DAILY
Qty: 0 | Refills: 0 | Status: DISCONTINUED | OUTPATIENT
Start: 2018-12-10 | End: 2018-12-11

## 2018-12-10 RX ORDER — ASPIRIN/CALCIUM CARB/MAGNESIUM 324 MG
81 TABLET ORAL DAILY
Qty: 0 | Refills: 0 | Status: DISCONTINUED | OUTPATIENT
Start: 2018-12-10 | End: 2018-12-11

## 2018-12-10 RX ORDER — METOPROLOL TARTRATE 50 MG
25 TABLET ORAL DAILY
Qty: 0 | Refills: 0 | Status: DISCONTINUED | OUTPATIENT
Start: 2018-12-10 | End: 2018-12-11

## 2018-12-10 RX ADMIN — Medication 81 MILLIGRAM(S): at 09:19

## 2018-12-10 RX ADMIN — SODIUM CHLORIDE 30 MILLILITER(S): 9 INJECTION INTRAMUSCULAR; INTRAVENOUS; SUBCUTANEOUS at 09:15

## 2018-12-10 RX ADMIN — Medication 1 PATCH: at 15:39

## 2018-12-10 RX ADMIN — ATORVASTATIN CALCIUM 40 MILLIGRAM(S): 80 TABLET, FILM COATED ORAL at 22:53

## 2018-12-10 RX ADMIN — CLOPIDOGREL BISULFATE 75 MILLIGRAM(S): 75 TABLET, FILM COATED ORAL at 09:20

## 2018-12-10 NOTE — DISCHARGE NOTE ADULT - CARE PLAN
Principal Discharge DX:	CAD (coronary artery disease)  Goal:	remain free from symptoms  Assessment and plan of treatment:	No heavy lifting, driving, sex, tub baths, swimming, or any activity that submerges the lower half of the body in water for 48 hours.  Limited walking and stairs for 48 hours.    Change the bandaid after 24 hours and every 24 hours after that.  Keep the puncture site dry and covered with a bandaid until a scab forms.    Observe the site frequently.  If bleeding or a large lump (the size of a golf ball or bigger) occurs lie flat, apply continuous direct pressure just above the puncture site for at least 10 minutes, and notify your physician immediately.  If the bleeding cannot be controlled, call 911 immediately for assistance.  Notify your physician of pain, swelling or any drainage.    Notify your physician immediately if coldness, numbness, discoloration or pain in your foot occurs.

## 2018-12-10 NOTE — H&P PST ADULT - PSH
right hernia repair    S/P CABG (Coronary Artery Bypass Graft)  5/2009 x 4 vessels  S/P Tonsillectomy

## 2018-12-10 NOTE — DISCHARGE NOTE ADULT - CARE PROVIDER_API CALL
Hernandez Bales), Cardiovascular Disease; Internal Medicine; Interventional Cardiology  [address] [place] [address]  Phone: [phone]  Fax: [phone]

## 2018-12-10 NOTE — DISCHARGE NOTE ADULT - PLAN OF CARE
remain free from symptoms No heavy lifting, driving, sex, tub baths, swimming, or any activity that submerges the lower half of the body in water for 48 hours.  Limited walking and stairs for 48 hours.    Change the bandaid after 24 hours and every 24 hours after that.  Keep the puncture site dry and covered with a bandaid until a scab forms.    Observe the site frequently.  If bleeding or a large lump (the size of a golf ball or bigger) occurs lie flat, apply continuous direct pressure just above the puncture site for at least 10 minutes, and notify your physician immediately.  If the bleeding cannot be controlled, call 911 immediately for assistance.  Notify your physician of pain, swelling or any drainage.    Notify your physician immediately if coldness, numbness, discoloration or pain in your foot occurs.

## 2018-12-10 NOTE — DISCHARGE NOTE ADULT - NS AS ACTIVITY OBS
No Heavy lifting/straining/Showering allowed No Heavy lifting/straining/Walking-Indoors allowed/Do not make important decisions/Do not drive or operate machinery/Showering allowed

## 2018-12-10 NOTE — DISCHARGE NOTE ADULT - MEDICATION SUMMARY - MEDICATIONS TO TAKE
I will START or STAY ON the medications listed below when I get home from the hospital:    aspirin 81 mg oral delayed release tablet  -- 1 tab(s) by mouth once a day  -- Indication: For antiplatelet to protect stents    Crestor  -- 10 milligram(s) by mouth once a day (at bedtime)  -- Indication: For cholesterol    clopidogrel 75 mg oral tablet  -- 1 tab(s) by mouth once a day  -- Indication: For antiplatelet to protect stents    Metoprolol Succinate ER 25 mg oral tablet, extended release  -- 1 tab(s) by mouth once a day (at bedtime)  -- Indication: For heart rate and blood pressure I will START or STAY ON the medications listed below when I get home from the hospital:    aspirin 81 mg oral delayed release tablet  -- 1 tab(s) by mouth once a day  -- Indication: For CAD (coronary artery disease)    atorvastatin 40 mg oral tablet  -- 1 tab(s) by mouth once a day (at bedtime)  -- Indication: For CAD (coronary artery disease)    clopidogrel 75 mg oral tablet  -- 1 tab(s) by mouth once a day  -- Indication: For CAD (coronary artery disease)    Metoprolol Succinate ER 25 mg oral tablet, extended release  -- 1 tab(s) by mouth once a day (at bedtime)  -- Indication: For heart rate and blood pressure

## 2018-12-10 NOTE — DISCHARGE NOTE ADULT - ADDITIONAL INSTRUCTIONS
Follow up with Dr. Bales in 1-2 weeks. Follow up with Dr. Bales in 1-2 weeks.  No heavy lifting, driving, sex, tub baths, swimming, or any activity that submerges the lower half of the body in water for 48 hours.  Limited walking and stairs for 48 hours.    Change the bandaid after 24 hours and every 24 hours after that.  Keep the puncture site dry and covered with a bandaid until a scab forms.    Observe the site frequently.  If bleeding or a large lump (the size of a golf ball or bigger) occurs lie flat, apply continuous direct pressure just above the puncture site for at least 10 minutes, and notify your physician immediately.  If the bleeding cannot be controlled, call 911 immediately for assistance.  Notify your physician of pain, swelling or any drainage.    Notify your physician immediately if coldness, numbness, discoloration or pain in your foot occurs.

## 2018-12-10 NOTE — DISCHARGE NOTE ADULT - PATIENT PORTAL LINK FT
You can access the [facility] Patient Portal, offered by [facility], by registering with the following website: [email or web address]

## 2018-12-10 NOTE — DISCHARGE NOTE ADULT - HOSPITAL COURSE
Patient is s/p LHC with YULIA to the LAD into Diag. Continue ASA/Plavix. Patient is a 63 year old male who presents for PST and procedure for LHC. Pmhx significant for CAGB x4 in 2009, CVA, carotid disease, TIA one month ago with improvement of symptoms. Patient recently had a stress test which showed ischemia during exersise with a medium sized moderate to severe apical anterior wall ischemia. EF 45% so the plan was made to proceed with heart cath.   Now s/p LHC with YULIA in the LAD to diag.      CAD with stenting:   -Resume home meds.  -Follow up with Dr. Bales in 1-2 weeks  -DAPT:  Plavix and aspirin  - Continue with BB and statin  -Life style modifications including, diet, exercise and smoking discussed at length.

## 2018-12-10 NOTE — H&P PST ADULT - HISTORY OF PRESENT ILLNESS
Patient is a 63 year old male who presents for PST and procedure for LHC. Pmhx significant for CAGB x4 in 2009, CVA, carotid disease, TIA one month ago with improvement of symptoms. Patient recently had a stress test which showed ischemia during exersise with a medium sized moderate to severe apical anterior wall ischemia. EF 45% so the plan was made to proceed with heart cath.

## 2018-12-10 NOTE — DISCHARGE NOTE ADULT - MEDICATION SUMMARY - MEDICATIONS TO STOP TAKING
I will STOP taking the medications listed below when I get home from the hospital:  None I will STOP taking the medications listed below when I get home from the hospital:    Crestor  -- 10 milligram(s) by mouth once a day (at bedtime)

## 2018-12-11 VITALS
SYSTOLIC BLOOD PRESSURE: 132 MMHG | RESPIRATION RATE: 17 BRPM | DIASTOLIC BLOOD PRESSURE: 62 MMHG | TEMPERATURE: 98 F | OXYGEN SATURATION: 100 % | HEART RATE: 70 BPM

## 2018-12-11 LAB
ALBUMIN SERPL ELPH-MCNC: 3.6 G/DL — SIGNIFICANT CHANGE UP (ref 3.3–5.2)
ALP SERPL-CCNC: 67 U/L — SIGNIFICANT CHANGE UP (ref 40–120)
ALT FLD-CCNC: 16 U/L — SIGNIFICANT CHANGE UP
ANION GAP SERPL CALC-SCNC: 11 MMOL/L — SIGNIFICANT CHANGE UP (ref 5–17)
AST SERPL-CCNC: 14 U/L — SIGNIFICANT CHANGE UP
BASOPHILS # BLD AUTO: 0 K/UL — SIGNIFICANT CHANGE UP (ref 0–0.2)
BASOPHILS NFR BLD AUTO: 0.3 % — SIGNIFICANT CHANGE UP (ref 0–2)
BILIRUB SERPL-MCNC: 0.4 MG/DL — SIGNIFICANT CHANGE UP (ref 0.4–2)
BLD GP AB SCN SERPL QL: SIGNIFICANT CHANGE UP
BUN SERPL-MCNC: 14 MG/DL — SIGNIFICANT CHANGE UP (ref 8–20)
CALCIUM SERPL-MCNC: 10.1 MG/DL — SIGNIFICANT CHANGE UP (ref 8.6–10.2)
CHLORIDE SERPL-SCNC: 109 MMOL/L — HIGH (ref 98–107)
CO2 SERPL-SCNC: 23 MMOL/L — SIGNIFICANT CHANGE UP (ref 22–29)
CREAT SERPL-MCNC: 0.71 MG/DL — SIGNIFICANT CHANGE UP (ref 0.5–1.3)
EOSINOPHIL # BLD AUTO: 0.2 K/UL — SIGNIFICANT CHANGE UP (ref 0–0.5)
EOSINOPHIL NFR BLD AUTO: 2.5 % — SIGNIFICANT CHANGE UP (ref 0–5)
GLUCOSE SERPL-MCNC: 91 MG/DL — SIGNIFICANT CHANGE UP (ref 70–115)
HCT VFR BLD CALC: 39.3 % — LOW (ref 42–52)
HGB BLD-MCNC: 13 G/DL — LOW (ref 14–18)
LYMPHOCYTES # BLD AUTO: 2.1 K/UL — SIGNIFICANT CHANGE UP (ref 1–4.8)
LYMPHOCYTES # BLD AUTO: 20.4 % — SIGNIFICANT CHANGE UP (ref 20–55)
MCHC RBC-ENTMCNC: 29.8 PG — SIGNIFICANT CHANGE UP (ref 27–31)
MCHC RBC-ENTMCNC: 33.1 G/DL — SIGNIFICANT CHANGE UP (ref 32–36)
MCV RBC AUTO: 90.1 FL — SIGNIFICANT CHANGE UP (ref 80–94)
MONOCYTES # BLD AUTO: 0.6 K/UL — SIGNIFICANT CHANGE UP (ref 0–0.8)
MONOCYTES NFR BLD AUTO: 5.9 % — SIGNIFICANT CHANGE UP (ref 3–10)
NEUTROPHILS # BLD AUTO: 7.1 K/UL — SIGNIFICANT CHANGE UP (ref 1.8–8)
NEUTROPHILS NFR BLD AUTO: 70.7 % — SIGNIFICANT CHANGE UP (ref 37–73)
PLATELET # BLD AUTO: 232 K/UL — SIGNIFICANT CHANGE UP (ref 150–400)
POTASSIUM SERPL-MCNC: 4.1 MMOL/L — SIGNIFICANT CHANGE UP (ref 3.5–5.3)
POTASSIUM SERPL-SCNC: 4.1 MMOL/L — SIGNIFICANT CHANGE UP (ref 3.5–5.3)
PROT SERPL-MCNC: 5.9 G/DL — LOW (ref 6.6–8.7)
RBC # BLD: 4.36 M/UL — LOW (ref 4.6–6.2)
RBC # FLD: 14.7 % — SIGNIFICANT CHANGE UP (ref 11–15.6)
SODIUM SERPL-SCNC: 143 MMOL/L — SIGNIFICANT CHANGE UP (ref 135–145)
TYPE + AB SCN PNL BLD: SIGNIFICANT CHANGE UP
WBC # BLD: 10.1 K/UL — SIGNIFICANT CHANGE UP (ref 4.8–10.8)
WBC # FLD AUTO: 10.1 K/UL — SIGNIFICANT CHANGE UP (ref 4.8–10.8)

## 2018-12-11 PROCEDURE — 86901 BLOOD TYPING SEROLOGIC RH(D): CPT

## 2018-12-11 PROCEDURE — C1894: CPT

## 2018-12-11 PROCEDURE — 36415 COLL VENOUS BLD VENIPUNCTURE: CPT

## 2018-12-11 PROCEDURE — 85027 COMPLETE CBC AUTOMATED: CPT

## 2018-12-11 PROCEDURE — 76937 US GUIDE VASCULAR ACCESS: CPT

## 2018-12-11 PROCEDURE — 80053 COMPREHEN METABOLIC PANEL: CPT

## 2018-12-11 PROCEDURE — 86900 BLOOD TYPING SEROLOGIC ABO: CPT

## 2018-12-11 PROCEDURE — C9600: CPT | Mod: LD

## 2018-12-11 PROCEDURE — 85610 PROTHROMBIN TIME: CPT

## 2018-12-11 PROCEDURE — 84484 ASSAY OF TROPONIN QUANT: CPT

## 2018-12-11 PROCEDURE — C1769: CPT

## 2018-12-11 PROCEDURE — C1874: CPT

## 2018-12-11 PROCEDURE — 99153 MOD SED SAME PHYS/QHP EA: CPT

## 2018-12-11 PROCEDURE — 86850 RBC ANTIBODY SCREEN: CPT

## 2018-12-11 PROCEDURE — 85730 THROMBOPLASTIN TIME PARTIAL: CPT

## 2018-12-11 PROCEDURE — 99152 MOD SED SAME PHYS/QHP 5/>YRS: CPT

## 2018-12-11 PROCEDURE — 93010 ELECTROCARDIOGRAM REPORT: CPT

## 2018-12-11 PROCEDURE — 93455 CORONARY ART/GRFT ANGIO S&I: CPT | Mod: 59

## 2018-12-11 PROCEDURE — C1725: CPT

## 2018-12-11 PROCEDURE — 93005 ELECTROCARDIOGRAM TRACING: CPT

## 2018-12-11 PROCEDURE — C1887: CPT

## 2018-12-11 RX ORDER — ASPIRIN/CALCIUM CARB/MAGNESIUM 324 MG
1 TABLET ORAL
Qty: 90 | Refills: 0 | OUTPATIENT
Start: 2018-12-11

## 2018-12-11 RX ORDER — ATORVASTATIN CALCIUM 80 MG/1
1 TABLET, FILM COATED ORAL
Qty: 30 | Refills: 0 | OUTPATIENT
Start: 2018-12-11

## 2018-12-11 RX ORDER — CLOPIDOGREL BISULFATE 75 MG/1
1 TABLET, FILM COATED ORAL
Qty: 90 | Refills: 0 | OUTPATIENT
Start: 2018-12-11 | End: 2019-01-09

## 2018-12-11 RX ORDER — ROSUVASTATIN CALCIUM 5 MG/1
10 TABLET ORAL
Qty: 0 | Refills: 0 | COMMUNITY

## 2018-12-11 RX ADMIN — Medication 25 MILLIGRAM(S): at 05:17

## 2018-12-11 RX ADMIN — Medication 1 PATCH: at 07:18

## 2018-12-11 RX ADMIN — CLOPIDOGREL BISULFATE 75 MILLIGRAM(S): 75 TABLET, FILM COATED ORAL at 08:07

## 2018-12-11 RX ADMIN — Medication 81 MILLIGRAM(S): at 08:07

## 2018-12-11 NOTE — PROGRESS NOTE ADULT - ASSESSMENT
Patient is a 63 year old male who presents for PST and procedure for LHC. Pmhx significant for CAGB x4 in 2009, CVA, carotid disease, TIA one month ago with improvement of symptoms. Patient recently had a stress test which showed ischemia during exersise with a medium sized moderate to severe apical anterior wall ischemia. EF 45% so the plan was made to proceed with heart cath.   Now s/p LHC with YULIA in the LAD to diag.      CAD with stenting:   -Resume home meds.  -Follow up with Dr. Bales in 1-2 weeks  -DAPT:  Plavix and aspirin  - Continue with BB and statin  -Life style modifications including, diet, exercise and smoking discussed at length.

## 2018-12-18 ENCOUNTER — APPOINTMENT (OUTPATIENT)
Dept: CARDIOLOGY | Facility: CLINIC | Age: 63
End: 2018-12-18
Payer: COMMERCIAL

## 2018-12-18 VITALS
WEIGHT: 154 LBS | DIASTOLIC BLOOD PRESSURE: 66 MMHG | OXYGEN SATURATION: 99 % | SYSTOLIC BLOOD PRESSURE: 124 MMHG | BODY MASS INDEX: 30.23 KG/M2 | HEART RATE: 76 BPM | HEIGHT: 60 IN

## 2018-12-18 DIAGNOSIS — E78.00 PURE HYPERCHOLESTEROLEMIA, UNSPECIFIED: ICD-10-CM

## 2018-12-18 DIAGNOSIS — I25.810 ATHEROSCLEROSIS OF CORONARY ARTERY BYPASS GRAFT(S) W/OUT ANGINA PECTORIS: ICD-10-CM

## 2018-12-18 DIAGNOSIS — I10 ESSENTIAL (PRIMARY) HYPERTENSION: ICD-10-CM

## 2018-12-18 PROCEDURE — 99214 OFFICE O/P EST MOD 30 MIN: CPT

## 2018-12-18 NOTE — PHYSICAL EXAM
[General Appearance - Well Developed] : well developed [Normal Conjunctiva] : the conjunctiva exhibited no abnormalities [Normal Oral Mucosa] : normal oral mucosa [Normal Jugular Venous V Waves Present] : normal jugular venous V waves present [] : no respiratory distress [Respiration, Rhythm And Depth] : normal respiratory rhythm and effort [Heart Rate And Rhythm] : heart rate and rhythm were normal [Heart Sounds] : normal S1 and S2 [Bowel Sounds] : normal bowel sounds [Abdomen Soft] : soft [FreeTextEntry1] : limited gait.  [Nail Clubbing] : no clubbing of the fingernails [Oriented To Time, Place, And Person] : oriented to person, place, and time

## 2018-12-18 NOTE — HISTORY OF PRESENT ILLNESS
[FreeTextEntry1] : Patient with history of CAD, 10 years prior 4x CABG, CVA, Carotid disease. 2009 CABG. \par Underwent AAA attempt repair and had cardiac complications. Underwent cardiac cath at [facility] at that time. \par TIA in the past month- left sided symptoms. Double vision. Improved in 24 hours. Noted to have carotid disease in the left CEA.\par Now with nuclear stress test - abnormal. Intermediate risk. \par \par 12/2018- Patient s/p PCI to diagonal with 1 YULIA.

## 2018-12-18 NOTE — DISCUSSION/SUMMARY
[FreeTextEntry1] : Patient with significant carotid disease. Planned for CEA. Now with abnormal nuclear stress test. Needs C first. If considered high risk after cardiac cath, will discuss with Dr. Burciaga regarding carotid stent. \par On aspirin and plavix. \par Now compliant with medications. \par \par Patient underwent coronary angiogram. Noted to have severe diagonal disease. PCI performed. Now stable from cardiac perspective for further CEA surgery. To see Dr. Durant for further workup.

## 2019-01-07 ENCOUNTER — OUTPATIENT (OUTPATIENT)
Dept: OUTPATIENT SERVICES | Facility: HOSPITAL | Age: 64
LOS: 1 days | Discharge: HOME | End: 2019-01-07

## 2019-01-07 VITALS
DIASTOLIC BLOOD PRESSURE: 74 MMHG | TEMPERATURE: 98 F | WEIGHT: 158.07 LBS | HEIGHT: 67 IN | RESPIRATION RATE: 16 BRPM | OXYGEN SATURATION: 99 % | HEART RATE: 65 BPM | SYSTOLIC BLOOD PRESSURE: 156 MMHG

## 2019-01-07 DIAGNOSIS — Z29.9 ENCOUNTER FOR PROPHYLACTIC MEASURES, UNSPECIFIED: ICD-10-CM

## 2019-01-07 DIAGNOSIS — Z98.890 OTHER SPECIFIED POSTPROCEDURAL STATES: Chronic | ICD-10-CM

## 2019-01-07 DIAGNOSIS — I65.29 OCCLUSION AND STENOSIS OF UNSPECIFIED CAROTID ARTERY: ICD-10-CM

## 2019-01-07 LAB
ANION GAP SERPL CALC-SCNC: 4 MMOL/L — LOW (ref 5–17)
BASOPHILS # BLD AUTO: 0.09 K/UL — SIGNIFICANT CHANGE UP (ref 0–0.2)
BASOPHILS NFR BLD AUTO: 0.9 % — SIGNIFICANT CHANGE UP (ref 0–2)
BLD GP AB SCN SERPL QL: SIGNIFICANT CHANGE UP
BUN SERPL-MCNC: 16 MG/DL — SIGNIFICANT CHANGE UP (ref 7–23)
CALCIUM SERPL-MCNC: 10.2 MG/DL — HIGH (ref 8.5–10.1)
CHLORIDE SERPL-SCNC: 114 MMOL/L — HIGH (ref 96–108)
CO2 SERPL-SCNC: 27 MMOL/L — SIGNIFICANT CHANGE UP (ref 22–31)
CREAT SERPL-MCNC: 0.82 MG/DL — SIGNIFICANT CHANGE UP (ref 0.5–1.3)
EOSINOPHIL # BLD AUTO: 0.24 K/UL — SIGNIFICANT CHANGE UP (ref 0–0.5)
EOSINOPHIL NFR BLD AUTO: 2.4 % — SIGNIFICANT CHANGE UP (ref 0–6)
GLUCOSE SERPL-MCNC: 123 MG/DL — HIGH (ref 70–99)
HCT VFR BLD CALC: 43.1 % — SIGNIFICANT CHANGE UP (ref 39–50)
HGB BLD-MCNC: 14.2 G/DL — SIGNIFICANT CHANGE UP (ref 13–17)
IMM GRANULOCYTES NFR BLD AUTO: 0.6 % — SIGNIFICANT CHANGE UP (ref 0–1.5)
LYMPHOCYTES # BLD AUTO: 2.08 K/UL — SIGNIFICANT CHANGE UP (ref 1–3.3)
LYMPHOCYTES # BLD AUTO: 21.1 % — SIGNIFICANT CHANGE UP (ref 13–44)
MCHC RBC-ENTMCNC: 29.9 PG — SIGNIFICANT CHANGE UP (ref 27–34)
MCHC RBC-ENTMCNC: 32.9 GM/DL — SIGNIFICANT CHANGE UP (ref 32–36)
MCV RBC AUTO: 90.7 FL — SIGNIFICANT CHANGE UP (ref 80–100)
MONOCYTES # BLD AUTO: 0.76 K/UL — SIGNIFICANT CHANGE UP (ref 0–0.9)
MONOCYTES NFR BLD AUTO: 7.7 % — SIGNIFICANT CHANGE UP (ref 2–14)
NEUTROPHILS # BLD AUTO: 6.63 K/UL — SIGNIFICANT CHANGE UP (ref 1.8–7.4)
NEUTROPHILS NFR BLD AUTO: 67.3 % — SIGNIFICANT CHANGE UP (ref 43–77)
NRBC # BLD: 0 /100 WBCS — SIGNIFICANT CHANGE UP (ref 0–0)
PLATELET # BLD AUTO: 257 K/UL — SIGNIFICANT CHANGE UP (ref 150–400)
POTASSIUM SERPL-MCNC: 4.7 MMOL/L — SIGNIFICANT CHANGE UP (ref 3.5–5.3)
POTASSIUM SERPL-SCNC: 4.7 MMOL/L — SIGNIFICANT CHANGE UP (ref 3.5–5.3)
RBC # BLD: 4.75 M/UL — SIGNIFICANT CHANGE UP (ref 4.2–5.8)
RBC # FLD: 14.3 % — SIGNIFICANT CHANGE UP (ref 10.3–14.5)
SODIUM SERPL-SCNC: 145 MMOL/L — SIGNIFICANT CHANGE UP (ref 135–145)
TYPE + AB SCN PNL BLD: SIGNIFICANT CHANGE UP
WBC # BLD: 9.86 K/UL — SIGNIFICANT CHANGE UP (ref 3.8–10.5)
WBC # FLD AUTO: 9.86 K/UL — SIGNIFICANT CHANGE UP (ref 3.8–10.5)

## 2019-01-07 NOTE — H&P PST ADULT - HISTORY OF PRESENT ILLNESS
63 year old male PMH of CAD s/p CABG x 4 2010,  cardiac  stent D1 12/2018 on aspirin and Plavix ( continue taking), HTN, HLD ADD; Pt had TIA 11/2018 work up done showed carotid stenosis; He presents to PST for Left CEA

## 2019-01-07 NOTE — H&P PST ADULT - NSANTHOSAYNRD_GEN_A_CORE
No. YAYA screening performed.  STOP BANG Legend: 0-2 = LOW Risk; 3-4 = INTERMEDIATE Risk; 5-8 = HIGH Risk

## 2019-01-07 NOTE — H&P PST ADULT - PSH
right hernia repair    S/P AAA repair  2010  S/P CABG (Coronary Artery Bypass Graft)  5/2009 x 4 vessels  S/P Tonsillectomy

## 2019-01-07 NOTE — H&P PST ADULT - ASSESSMENT
63 year old male for presents to PST for Left CEA    Plan:  1. PST instructions given ; NPO post midnight   2. Pt instructed to take following meds with sip of water : aspirin and plavix   3. EZ wash instructions given   4. Medical Evaluation with Dr Bhasin CAPRINI SCORE [CLOT]    AGE RELATED RISK FACTORS                                                       MOBILITY RELATED FACTORS  [ ] Age 41-60 years                                            (1 Point)                  [ ] Bed rest                                                        (1 Point)  [x ] Age: 61-74 years                                           (2 Points)                 [ ] Plaster cast                                                   (2 Points)  [ ] Age= 75 years                                              (3 Points)                 [ ] Bed bound for more than 72 hours                 (2 Points)    DISEASE RELATED RISK FACTORS                                               GENDER SPECIFIC FACTORS  [ ] Edema in the lower extremities                       (1 Point)                  [ ] Pregnancy                                                     (1 Point)  [ ] Varicose veins                                               (1 Point)                  [ ] Post-partum < 6 weeks                                   (1 Point)             [ x] BMI > 25 Kg/m2                                            (1 Point)                  [ ] Hormonal therapy  or oral contraception          (1 Point)                 [ ] Sepsis (in the previous month)                        (1 Point)                  [ ] History of pregnancy complications                 (1 point)  [ ] Pneumonia or serious lung disease                                               [ ] Unexplained or recurrent                     (1 Point)           (in the previous month)                               (1 Point)  [ ] Abnormal pulmonary function test                     (1 Point)                 SURGERY RELATED RISK FACTORS  [ ] Acute myocardial infarction                              (1 Point)                 [ ]  Section                                             (1 Point)  [ ] Congestive heart failure (in the previous month)  (1 Point)               [ ] Minor surgery                                                  (1 Point)   [ ] Inflammatory bowel disease                             (1 Point)                 [ ] Arthroscopic surgery                                        (2 Points)  [ ] Central venous access                                      (2 Points)                [x ] General surgery lasting more than 45 minutes   (2 Points)       [ ] Stroke (in the previous month)                          (5 Points)               [ ] Elective arthroplasty                                         (5 Points)            ( ) past and present malignancy                             ( 2 points)                                                                                                                                    HEMATOLOGY RELATED FACTORS                                                 TRAUMA RELATED RISK FACTORS  [ ] Prior episodes of VTE                                     (3 Points)                 [ ] Fracture of the hip, pelvis, or leg                       (5 Points)  [ ] Positive family history for VTE                         (3 Points)                 [ ] Acute spinal cord injury (in the previous month)  (5 Points)  [ ] Prothrombin 27923 A                                     (3 Points)                 [ ] Paralysis  (less than 1 month)                             (5 Points)  [ ] Factor V Leiden                                             (3 Points)                  [ ] Multiple Trauma within 1 month                        (5 Points)  [ ] Lupus anticoagulants                                     (3 Points)                                                           [ ] Anticardiolipin antibodies                               (3 Points)                                                       [ ] High homocysteine in the blood                      (3 Points)                                             [ ] Other congenital or acquired thrombophilia      (3 Points)                                                [ ] Heparin induced thrombocytopenia                  (3 Points)                                          Total Score [     5     ]

## 2019-01-07 NOTE — H&P PST ADULT - PMH
AAA (Abdominal Aortic Aneurysm)  Dx. 5/2009- attempted repair  12/2009 surgery aborted due to hypotension.r  ADD (Attention Deficit Disorder)    CABG (Coronary Artery Bypass Graft)x4 6/09    CAD (Coronary Artery Disease)    Carotid stenosis, left    HTN (Hypertension), Benign    Hyperlipidemia    Stented coronary artery  12/10/2018         D1  TIA (transient ischemic attack)  11/2018 AAA (Abdominal Aortic Aneurysm)  Dx. 5/2009- attempted repair  12/2009 surgery aborted due to hypotension.r  ADD (Attention Deficit Disorder)    CABG (Coronary Artery Bypass Graft)x4 6/09    CAD (Coronary Artery Disease)    Carotid stenosis, left    HTN (Hypertension), Benign    Hyperlipidemia    Stented coronary artery  12/10/2018         D1  TIA (transient ischemic attack)  11/2018  TIA (transient ischemic attack)  11/2018

## 2019-01-10 RX ORDER — SODIUM CHLORIDE 9 MG/ML
3 INJECTION INTRAMUSCULAR; INTRAVENOUS; SUBCUTANEOUS EVERY 8 HOURS
Qty: 0 | Refills: 0 | Status: DISCONTINUED | OUTPATIENT
Start: 2019-01-12 | End: 2019-01-12

## 2019-01-11 ENCOUNTER — RESULT REVIEW (OUTPATIENT)
Age: 64
End: 2019-01-11

## 2019-01-11 ENCOUNTER — INPATIENT (INPATIENT)
Facility: HOSPITAL | Age: 64
LOS: 0 days | Discharge: HOME | End: 2019-01-12
Payer: COMMERCIAL

## 2019-01-11 VITALS
RESPIRATION RATE: 16 BRPM | OXYGEN SATURATION: 99 % | WEIGHT: 151.9 LBS | HEART RATE: 46 BPM | TEMPERATURE: 98 F | SYSTOLIC BLOOD PRESSURE: 134 MMHG | DIASTOLIC BLOOD PRESSURE: 72 MMHG | HEIGHT: 67 IN

## 2019-01-11 DIAGNOSIS — Z98.890 OTHER SPECIFIED POSTPROCEDURAL STATES: Chronic | ICD-10-CM

## 2019-01-11 LAB
ANION GAP SERPL CALC-SCNC: 4 MMOL/L — LOW (ref 5–17)
APTT BLD: 36.6 SEC — HIGH (ref 27.5–36.3)
BUN SERPL-MCNC: 14 MG/DL — SIGNIFICANT CHANGE UP (ref 7–23)
CALCIUM SERPL-MCNC: 9.2 MG/DL — SIGNIFICANT CHANGE UP (ref 8.5–10.1)
CHLORIDE SERPL-SCNC: 112 MMOL/L — HIGH (ref 96–108)
CO2 SERPL-SCNC: 26 MMOL/L — SIGNIFICANT CHANGE UP (ref 22–31)
CREAT SERPL-MCNC: 0.76 MG/DL — SIGNIFICANT CHANGE UP (ref 0.5–1.3)
GLUCOSE SERPL-MCNC: 145 MG/DL — HIGH (ref 70–99)
HCT VFR BLD CALC: 36.4 % — LOW (ref 39–50)
HGB BLD-MCNC: 12.3 G/DL — LOW (ref 13–17)
INR BLD: 1.05 RATIO — SIGNIFICANT CHANGE UP (ref 0.88–1.16)
MCHC RBC-ENTMCNC: 31.1 PG — SIGNIFICANT CHANGE UP (ref 27–34)
MCHC RBC-ENTMCNC: 33.8 GM/DL — SIGNIFICANT CHANGE UP (ref 32–36)
MCV RBC AUTO: 92.2 FL — SIGNIFICANT CHANGE UP (ref 80–100)
NRBC # BLD: 0 /100 WBCS — SIGNIFICANT CHANGE UP (ref 0–0)
PLATELET # BLD AUTO: 204 K/UL — SIGNIFICANT CHANGE UP (ref 150–400)
POTASSIUM SERPL-MCNC: 4 MMOL/L — SIGNIFICANT CHANGE UP (ref 3.5–5.3)
POTASSIUM SERPL-SCNC: 4 MMOL/L — SIGNIFICANT CHANGE UP (ref 3.5–5.3)
PROTHROM AB SERPL-ACNC: 11.7 SEC — SIGNIFICANT CHANGE UP (ref 10–12.9)
RBC # BLD: 3.95 M/UL — LOW (ref 4.2–5.8)
RBC # FLD: 13.9 % — SIGNIFICANT CHANGE UP (ref 10.3–14.5)
SODIUM SERPL-SCNC: 142 MMOL/L — SIGNIFICANT CHANGE UP (ref 135–145)
WBC # BLD: 14.78 K/UL — HIGH (ref 3.8–10.5)
WBC # FLD AUTO: 14.78 K/UL — HIGH (ref 3.8–10.5)

## 2019-01-11 PROCEDURE — 35301 RECHANNELING OF ARTERY: CPT | Mod: LT

## 2019-01-11 PROCEDURE — 88311 DECALCIFY TISSUE: CPT | Mod: 26

## 2019-01-11 PROCEDURE — 88304 TISSUE EXAM BY PATHOLOGIST: CPT | Mod: 26

## 2019-01-11 RX ORDER — OXYCODONE HYDROCHLORIDE 5 MG/1
5 TABLET ORAL ONCE
Qty: 0 | Refills: 0 | Status: DISCONTINUED | OUTPATIENT
Start: 2019-01-11 | End: 2019-01-11

## 2019-01-11 RX ORDER — ACETAMINOPHEN 500 MG
975 TABLET ORAL ONCE
Qty: 0 | Refills: 0 | Status: COMPLETED | OUTPATIENT
Start: 2019-01-11 | End: 2019-01-11

## 2019-01-11 RX ORDER — HYDROMORPHONE HYDROCHLORIDE 2 MG/ML
0.5 INJECTION INTRAMUSCULAR; INTRAVENOUS; SUBCUTANEOUS
Qty: 0 | Refills: 0 | Status: DISCONTINUED | OUTPATIENT
Start: 2019-01-11 | End: 2019-01-11

## 2019-01-11 RX ORDER — ATORVASTATIN CALCIUM 80 MG/1
40 TABLET, FILM COATED ORAL AT BEDTIME
Qty: 0 | Refills: 0 | Status: DISCONTINUED | OUTPATIENT
Start: 2019-01-11 | End: 2019-01-12

## 2019-01-11 RX ORDER — ONDANSETRON 8 MG/1
4 TABLET, FILM COATED ORAL EVERY 6 HOURS
Qty: 0 | Refills: 0 | Status: DISCONTINUED | OUTPATIENT
Start: 2019-01-12 | End: 2019-01-12

## 2019-01-11 RX ORDER — OXYCODONE HYDROCHLORIDE 5 MG/1
10 TABLET ORAL ONCE
Qty: 0 | Refills: 0 | Status: DISCONTINUED | OUTPATIENT
Start: 2019-01-11 | End: 2019-01-11

## 2019-01-11 RX ORDER — FENTANYL CITRATE 50 UG/ML
50 INJECTION INTRAVENOUS
Qty: 0 | Refills: 0 | Status: DISCONTINUED | OUTPATIENT
Start: 2019-01-11 | End: 2019-01-12

## 2019-01-11 RX ORDER — ASPIRIN/CALCIUM CARB/MAGNESIUM 324 MG
81 TABLET ORAL DAILY
Qty: 0 | Refills: 0 | Status: DISCONTINUED | OUTPATIENT
Start: 2019-01-12 | End: 2019-01-12

## 2019-01-11 RX ORDER — OXYCODONE AND ACETAMINOPHEN 5; 325 MG/1; MG/1
1 TABLET ORAL EVERY 4 HOURS
Qty: 0 | Refills: 0 | Status: DISCONTINUED | OUTPATIENT
Start: 2019-01-12 | End: 2019-01-12

## 2019-01-11 RX ORDER — FAMOTIDINE 10 MG/ML
20 INJECTION INTRAVENOUS ONCE
Qty: 0 | Refills: 0 | Status: COMPLETED | OUTPATIENT
Start: 2019-01-11 | End: 2019-01-11

## 2019-01-11 RX ORDER — METOPROLOL TARTRATE 50 MG
25 TABLET ORAL DAILY
Qty: 0 | Refills: 0 | Status: DISCONTINUED | OUTPATIENT
Start: 2019-01-11 | End: 2019-01-12

## 2019-01-11 RX ORDER — SODIUM CHLORIDE 9 MG/ML
1000 INJECTION INTRAMUSCULAR; INTRAVENOUS; SUBCUTANEOUS ONCE
Qty: 0 | Refills: 0 | Status: COMPLETED | OUTPATIENT
Start: 2019-01-11 | End: 2019-01-11

## 2019-01-11 RX ORDER — ONDANSETRON 8 MG/1
4 TABLET, FILM COATED ORAL EVERY 6 HOURS
Qty: 0 | Refills: 0 | Status: DISCONTINUED | OUTPATIENT
Start: 2019-01-11 | End: 2019-01-12

## 2019-01-11 RX ORDER — CLOPIDOGREL BISULFATE 75 MG/1
75 TABLET, FILM COATED ORAL DAILY
Qty: 0 | Refills: 0 | Status: DISCONTINUED | OUTPATIENT
Start: 2019-01-12 | End: 2019-01-12

## 2019-01-11 RX ORDER — SODIUM CHLORIDE 9 MG/ML
1000 INJECTION, SOLUTION INTRAVENOUS
Qty: 0 | Refills: 0 | Status: DISCONTINUED | OUTPATIENT
Start: 2019-01-11 | End: 2019-01-12

## 2019-01-11 RX ORDER — SODIUM CHLORIDE 9 MG/ML
1000 INJECTION INTRAMUSCULAR; INTRAVENOUS; SUBCUTANEOUS
Qty: 0 | Refills: 0 | Status: DISCONTINUED | OUTPATIENT
Start: 2019-01-12 | End: 2019-01-12

## 2019-01-11 RX ADMIN — HYDROMORPHONE HYDROCHLORIDE 0.5 MILLIGRAM(S): 2 INJECTION INTRAMUSCULAR; INTRAVENOUS; SUBCUTANEOUS at 18:11

## 2019-01-11 RX ADMIN — OXYCODONE HYDROCHLORIDE 5 MILLIGRAM(S): 5 TABLET ORAL at 20:54

## 2019-01-11 RX ADMIN — HYDROMORPHONE HYDROCHLORIDE 0.5 MILLIGRAM(S): 2 INJECTION INTRAMUSCULAR; INTRAVENOUS; SUBCUTANEOUS at 18:30

## 2019-01-11 RX ADMIN — SODIUM CHLORIDE 75 MILLILITER(S): 9 INJECTION, SOLUTION INTRAVENOUS at 16:10

## 2019-01-11 RX ADMIN — OXYCODONE HYDROCHLORIDE 10 MILLIGRAM(S): 5 TABLET ORAL at 10:37

## 2019-01-11 RX ADMIN — HYDROMORPHONE HYDROCHLORIDE 0.5 MILLIGRAM(S): 2 INJECTION INTRAMUSCULAR; INTRAVENOUS; SUBCUTANEOUS at 17:45

## 2019-01-11 RX ADMIN — ONDANSETRON 4 MILLIGRAM(S): 8 TABLET, FILM COATED ORAL at 23:00

## 2019-01-11 RX ADMIN — Medication 975 MILLIGRAM(S): at 10:37

## 2019-01-11 RX ADMIN — HYDROMORPHONE HYDROCHLORIDE 0.5 MILLIGRAM(S): 2 INJECTION INTRAMUSCULAR; INTRAVENOUS; SUBCUTANEOUS at 17:34

## 2019-01-11 RX ADMIN — FENTANYL CITRATE 50 MICROGRAM(S): 50 INJECTION INTRAVENOUS at 17:00

## 2019-01-11 RX ADMIN — HYDROMORPHONE HYDROCHLORIDE 0.5 MILLIGRAM(S): 2 INJECTION INTRAMUSCULAR; INTRAVENOUS; SUBCUTANEOUS at 18:00

## 2019-01-11 RX ADMIN — OXYCODONE HYDROCHLORIDE 10 MILLIGRAM(S): 5 TABLET ORAL at 10:38

## 2019-01-11 RX ADMIN — FENTANYL CITRATE 50 MICROGRAM(S): 50 INJECTION INTRAVENOUS at 17:15

## 2019-01-11 RX ADMIN — HYDROMORPHONE HYDROCHLORIDE 0.5 MILLIGRAM(S): 2 INJECTION INTRAMUSCULAR; INTRAVENOUS; SUBCUTANEOUS at 17:24

## 2019-01-11 RX ADMIN — FAMOTIDINE 20 MILLIGRAM(S): 10 INJECTION INTRAVENOUS at 10:37

## 2019-01-11 RX ADMIN — OXYCODONE HYDROCHLORIDE 5 MILLIGRAM(S): 5 TABLET ORAL at 21:30

## 2019-01-11 RX ADMIN — FENTANYL CITRATE 50 MICROGRAM(S): 50 INJECTION INTRAVENOUS at 16:45

## 2019-01-11 RX ADMIN — FENTANYL CITRATE 50 MICROGRAM(S): 50 INJECTION INTRAVENOUS at 16:33

## 2019-01-11 RX ADMIN — SODIUM CHLORIDE 1000 MILLILITER(S): 9 INJECTION INTRAMUSCULAR; INTRAVENOUS; SUBCUTANEOUS at 23:05

## 2019-01-11 NOTE — BRIEF OPERATIVE NOTE - PROCEDURE
<<-----Click on this checkbox to enter Procedure Carotid endarterectomy with electroencephalography or somatosensory evoked potential (SSEP) monitoring or both forms of monitoring  01/11/2019    Active  [id number]

## 2019-01-12 ENCOUNTER — TRANSCRIPTION ENCOUNTER (OUTPATIENT)
Age: 64
End: 2019-01-12

## 2019-01-12 VITALS
HEART RATE: 81 BPM | SYSTOLIC BLOOD PRESSURE: 127 MMHG | RESPIRATION RATE: 10 BRPM | OXYGEN SATURATION: 100 % | DIASTOLIC BLOOD PRESSURE: 60 MMHG

## 2019-01-12 LAB
ANION GAP SERPL CALC-SCNC: 6 MMOL/L — SIGNIFICANT CHANGE UP (ref 5–17)
BUN SERPL-MCNC: 11 MG/DL — SIGNIFICANT CHANGE UP (ref 7–23)
CALCIUM SERPL-MCNC: 9 MG/DL — SIGNIFICANT CHANGE UP (ref 8.5–10.1)
CHLORIDE SERPL-SCNC: 114 MMOL/L — HIGH (ref 96–108)
CO2 SERPL-SCNC: 26 MMOL/L — SIGNIFICANT CHANGE UP (ref 22–31)
CREAT SERPL-MCNC: 0.65 MG/DL — SIGNIFICANT CHANGE UP (ref 0.5–1.3)
GLUCOSE SERPL-MCNC: 119 MG/DL — HIGH (ref 70–99)
HCT VFR BLD CALC: 32 % — LOW (ref 39–50)
HGB BLD-MCNC: 10.8 G/DL — LOW (ref 13–17)
MCHC RBC-ENTMCNC: 31 PG — SIGNIFICANT CHANGE UP (ref 27–34)
MCHC RBC-ENTMCNC: 33.8 GM/DL — SIGNIFICANT CHANGE UP (ref 32–36)
MCV RBC AUTO: 92 FL — SIGNIFICANT CHANGE UP (ref 80–100)
NRBC # BLD: 0 /100 WBCS — SIGNIFICANT CHANGE UP (ref 0–0)
PLATELET # BLD AUTO: 195 K/UL — SIGNIFICANT CHANGE UP (ref 150–400)
POTASSIUM SERPL-MCNC: 3.8 MMOL/L — SIGNIFICANT CHANGE UP (ref 3.5–5.3)
POTASSIUM SERPL-SCNC: 3.8 MMOL/L — SIGNIFICANT CHANGE UP (ref 3.5–5.3)
RBC # BLD: 3.48 M/UL — LOW (ref 4.2–5.8)
RBC # FLD: 14 % — SIGNIFICANT CHANGE UP (ref 10.3–14.5)
SODIUM SERPL-SCNC: 146 MMOL/L — HIGH (ref 135–145)
WBC # BLD: 13.37 K/UL — HIGH (ref 3.8–10.5)
WBC # FLD AUTO: 13.37 K/UL — HIGH (ref 3.8–10.5)

## 2019-01-12 RX ORDER — NICOTINE POLACRILEX 2 MG
1 GUM BUCCAL
Qty: 0 | Refills: 0 | COMMUNITY

## 2019-01-12 RX ORDER — HYDRALAZINE HCL 50 MG
10 TABLET ORAL ONCE
Qty: 0 | Refills: 0 | Status: DISCONTINUED | OUTPATIENT
Start: 2019-01-12 | End: 2019-01-12

## 2019-01-12 RX ADMIN — Medication 81 MILLIGRAM(S): at 12:37

## 2019-01-12 RX ADMIN — SODIUM CHLORIDE 75 MILLILITER(S): 9 INJECTION, SOLUTION INTRAVENOUS at 00:25

## 2019-01-12 RX ADMIN — Medication 25 MILLIGRAM(S): at 06:06

## 2019-01-12 RX ADMIN — SODIUM CHLORIDE 3 MILLILITER(S): 9 INJECTION INTRAMUSCULAR; INTRAVENOUS; SUBCUTANEOUS at 15:03

## 2019-01-12 RX ADMIN — OXYCODONE AND ACETAMINOPHEN 1 TABLET(S): 5; 325 TABLET ORAL at 01:44

## 2019-01-12 RX ADMIN — CLOPIDOGREL BISULFATE 75 MILLIGRAM(S): 75 TABLET, FILM COATED ORAL at 12:37

## 2019-01-12 RX ADMIN — SODIUM CHLORIDE 3 MILLILITER(S): 9 INJECTION INTRAMUSCULAR; INTRAVENOUS; SUBCUTANEOUS at 06:07

## 2019-01-12 RX ADMIN — OXYCODONE AND ACETAMINOPHEN 1 TABLET(S): 5; 325 TABLET ORAL at 05:41

## 2019-01-12 NOTE — DISCHARGE NOTE ADULT - ADDITIONAL INSTRUCTIONS
Patient has moderate left neck hematoma post operatively recommend ice packs to left neck TID (10-15min at a time).

## 2019-01-12 NOTE — DISCHARGE NOTE ADULT - MEDICATION SUMMARY - MEDICATIONS TO TAKE
I will START or STAY ON the medications listed below when I get home from the hospital:    aspirin 81 mg oral delayed release tablet  -- 1 tab(s) by mouth once a day  -- Indication: For CAROTID STENOSIS LEFT SIDE    rosuvastatin 10 mg oral tablet  -- 1 tab(s) by mouth once a day (at bedtime)  -- Indication: For CAROTID STENOSIS LEFT SIDE    clopidogrel 75 mg oral tablet  -- 1 tab(s) by mouth once a day  -- Indication: For CAROTID STENOSIS LEFT SIDE    Metoprolol Succinate ER 25 mg oral tablet, extended release  -- 1 tab(s) by mouth once a day (at bedtime)  -- Indication: For CAROTID STENOSIS LEFT SIDE    Colace 100 mg oral capsule  -- Indication: For CAROTID STENOSIS LEFT SIDE

## 2019-01-12 NOTE — DISCHARGE NOTE ADULT - HOSPITAL COURSE
Patient came through ambulatory surgery for CEA of Left NIC stenosis. Patient tolerated procedure well and was monitored overnight with one episdoe of hypotension whcih resolved with fluid bolus. Patient post operatively developed left neck hematoma which was treated with cold compresses. Once patient pain controlled with PO pain medication, voiding freely, ambulating patient was stable for DC home.    Patient will follow up with Dr Burciaga and will call to confirm appointment and will continue cold compresses TID. Patient advised to return to hospital if there are any acute changes.

## 2019-01-12 NOTE — PROGRESS NOTE ADULT - ASSESSMENT
64 yo male with left carotid stenosis, now POD1 left CEA     -pain control PRN   -serial neck exams   -start on home medications   -continue with ASA/Plavix   -encourage ambulation/oob   -ADAT to regular   -GI/DVT ppx   -trial to void, bladder scan/straight cath as needed    Case seen and discussed with Dr. Hall

## 2019-01-12 NOTE — DISCHARGE NOTE ADULT - CARE PLAN
Principal Discharge DX:	Carotid stenosis, left  Goal:	LICA stenosis  Assessment and plan of treatment:	CEA of left ICA

## 2019-01-12 NOTE — PROGRESS NOTE ADULT - SUBJECTIVE AND OBJECTIVE BOX
pt seen and evaluated during morning rounds.  pt doing well, denies pain at this time.  He had one episode of hypotensive with SBP in the 80s with MAP 55, complain of clamminess, no tachycardia throughout the night.  pt was bolused 1 L NS and was straight cath with 750 cc clear urine return.  He responded appropriately to fluid challenge with SBP on rosette & NIBP of 120s.   stat labs and repeat labs with H/H stable     incision with mild swelling and ecchymosis with neck circumference of 18 incision since post op.  mild strikethrough on dressings, pt denies any pain to incision site.      ICU Vital Signs Last 24 Hrs  T(C): 36.6 (12 Jan 2019 06:00), Max: 37.8 (11 Jan 2019 21:30)  T(F): 97.9 (12 Jan 2019 06:00), Max: 100 (11 Jan 2019 21:30)  HR: 73 (12 Jan 2019 06:00) (46 - 83)  BP: 119/52 (12 Jan 2019 06:00) (78/40 - 134/72)  BP(mean): 68 (12 Jan 2019 06:00) (68 - 73)  ABP: 138/53 (12 Jan 2019 00:30) (79/36 - 146/56)  ABP(mean): 80 (11 Jan 2019 23:59) (75 - 80)  RR: 10 (12 Jan 2019 06:00) (0 - 20)  SpO2: 97% (12 Jan 2019 06:00) (96% - 100%)                            10.8   13.37 )-----------( 195      ( 12 Jan 2019 05:30 )             32.0       01-12    146<H>  |  114<H>  |  11  ----------------------------<  119<H>  3.8   |  26  |  0.65    Ca    9.0      12 Jan 2019 05:30      PT/INR - ( 11 Jan 2019 10:25 )   PT: 11.7 sec;   INR: 1.05 ratio         PTT - ( 11 Jan 2019 10:25 )  PTT:36.6 sec      PHYSICAL EXAM:   GENERAL: appears stated age, well dressed, well nourished   neuro: NAD, aox3   HEENT: left oblique incision with mild swelling and strikethrough, ecchymosis at incision site noted, C/D/I,  no pain/tenderness palpated, neck soft, neck circumference 18 inches   CV: S1S2, RRR, no m/r/g   pulm: bilateral air entry, non-labored breathing   abd: soft, ND, NTTP   EXT: FROM, skin WWP, CFT < 3 sec, motor strength 5/5 4 quadrants, CN grossly intact

## 2019-01-12 NOTE — DISCHARGE NOTE ADULT - PATIENT PORTAL LINK FT
You can access the [facility] Patient Portal, offered by [facility], by registering with the following website: [email or web address]

## 2019-01-12 NOTE — DISCHARGE NOTE ADULT - INSTRUCTIONS
regular diet call MD with any temp 101 f or above, nausea/vomiting, oozing or bleeding from the incision site, excessive pain or increase swelling.

## 2019-01-12 NOTE — PROGRESS NOTE ADULT - ATTENDING COMMENTS
Moderately large L neck hematoma  Pain with swallowing but tolerating clears  Pain controlled without narcotics  Pt has urinated this AM after garces D/C  He has been out of bed and ambulated  No stridor    Rec   ice packs to left neck TID (10-15min at a time)  Regular diet  D/C home this afternoon

## 2019-01-14 ENCOUNTER — APPOINTMENT (OUTPATIENT)
Dept: VASCULAR SURGERY | Facility: CLINIC | Age: 64
End: 2019-01-14
Payer: COMMERCIAL

## 2019-01-14 DIAGNOSIS — G45.9 TRANSIENT CEREBRAL ISCHEMIC ATTACK, UNSPECIFIED: ICD-10-CM

## 2019-01-14 PROBLEM — Z95.5 PRESENCE OF CORONARY ANGIOPLASTY IMPLANT AND GRAFT: Chronic | Status: ACTIVE | Noted: 2019-01-07

## 2019-01-14 PROBLEM — I65.22 OCCLUSION AND STENOSIS OF LEFT CAROTID ARTERY: Chronic | Status: ACTIVE | Noted: 2019-01-07

## 2019-01-14 PROCEDURE — 99024 POSTOP FOLLOW-UP VISIT: CPT

## 2019-01-15 ENCOUNTER — APPOINTMENT (OUTPATIENT)
Dept: VASCULAR SURGERY | Facility: CLINIC | Age: 64
End: 2019-01-15
Payer: COMMERCIAL

## 2019-01-15 VITALS
SYSTOLIC BLOOD PRESSURE: 163 MMHG | BODY MASS INDEX: 30.23 KG/M2 | OXYGEN SATURATION: 98 % | HEIGHT: 60 IN | HEART RATE: 79 BPM | DIASTOLIC BLOOD PRESSURE: 83 MMHG | WEIGHT: 154 LBS | TEMPERATURE: 98.2 F

## 2019-01-15 PROCEDURE — 99024 POSTOP FOLLOW-UP VISIT: CPT

## 2019-01-15 NOTE — ASSESSMENT
[FreeTextEntry1] : 63 year old male s/p left carotid endarterectomy 4 days ago.\par He is on DAPT for recent PCI with YULIA.\par He developed a moderate sized left neck hematoma with oozing from his incision.\par Incision line oversewn with 4-0 monocryl using sterile technique. procedure was well tolerated\par -Follow up in my office next week Wednesday\par -Continue antiplatelet therapy\par -Advised to call immediately if he develops worsening swelling, dysphagia or dyspnea

## 2019-01-15 NOTE — PHYSICAL EXAM
[JVD] : no jugular venous distention  [2+] : left 2+ [Ankle Swelling (On Exam)] : not present [Abdomen Masses] : No abdominal masses [de-identified] : AAO x 3, NAD [de-identified] : Moderate sized, soft left neck hematoma

## 2019-01-15 NOTE — HISTORY OF PRESENT ILLNESS
[FreeTextEntry1] : 63 year old male s/p left carotid endarterectomy 4 days ago.\par He is on DAPT for recent PCI with YULIA.\par He developed a moderate sized left neck hematoma with oozing from his incision.\par He denies dysphagia, neck pain, dyspnea or noisy breathing.\par He otherwise feels well
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
## 2019-01-16 DIAGNOSIS — I25.10 ATHEROSCLEROTIC HEART DISEASE OF NATIVE CORONARY ARTERY WITHOUT ANGINA PECTORIS: ICD-10-CM

## 2019-01-16 DIAGNOSIS — Z95.1 PRESENCE OF AORTOCORONARY BYPASS GRAFT: ICD-10-CM

## 2019-01-16 DIAGNOSIS — Z95.5 PRESENCE OF CORONARY ANGIOPLASTY IMPLANT AND GRAFT: ICD-10-CM

## 2019-01-16 DIAGNOSIS — F98.8 OTHER SPECIFIED BEHAVIORAL AND EMOTIONAL DISORDERS WITH ONSET USUALLY OCCURRING IN CHILDHOOD AND ADOLESCENCE: ICD-10-CM

## 2019-01-16 DIAGNOSIS — L76.32 POSTPROCEDURAL HEMATOMA OF SKIN AND SUBCUTANEOUS TISSUE FOLLOWING OTHER PROCEDURE: ICD-10-CM

## 2019-01-16 DIAGNOSIS — I65.22 OCCLUSION AND STENOSIS OF LEFT CAROTID ARTERY: ICD-10-CM

## 2019-01-16 DIAGNOSIS — Z86.73 PERSONAL HISTORY OF TRANSIENT ISCHEMIC ATTACK (TIA), AND CEREBRAL INFARCTION WITHOUT RESIDUAL DEFICITS: ICD-10-CM

## 2019-01-16 DIAGNOSIS — I10 ESSENTIAL (PRIMARY) HYPERTENSION: ICD-10-CM

## 2019-01-16 DIAGNOSIS — I95.81 POSTPROCEDURAL HYPOTENSION: ICD-10-CM

## 2019-01-16 DIAGNOSIS — E78.5 HYPERLIPIDEMIA, UNSPECIFIED: ICD-10-CM

## 2019-01-16 DIAGNOSIS — I25.2 OLD MYOCARDIAL INFARCTION: ICD-10-CM
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
## 2019-01-16 DIAGNOSIS — I25.5 ISCHEMIC CARDIOMYOPATHY: ICD-10-CM

## 2019-01-23 ENCOUNTER — APPOINTMENT (OUTPATIENT)
Dept: VASCULAR SURGERY | Facility: CLINIC | Age: 64
End: 2019-01-23
Payer: COMMERCIAL

## 2019-01-23 VITALS
BODY MASS INDEX: 30.23 KG/M2 | RESPIRATION RATE: 14 BRPM | HEART RATE: 72 BPM | SYSTOLIC BLOOD PRESSURE: 127 MMHG | HEIGHT: 60 IN | WEIGHT: 154 LBS | DIASTOLIC BLOOD PRESSURE: 79 MMHG

## 2019-01-23 PROCEDURE — 99024 POSTOP FOLLOW-UP VISIT: CPT

## 2019-01-23 NOTE — ASSESSMENT
[FreeTextEntry1] : 63 year old male s/p left carotid endarterectomy 2 weeks ago\par He is on DAPT for recent PCI with YULIA.\par He developed a moderate sized left neck hematoma with oozing from his incision.\par Hematoma is resolving\par Continue plavix\par Will see in office next week

## 2019-01-23 NOTE — PHYSICAL EXAM
[JVD] : no jugular venous distention  [Normal Heart Sounds] : normal heart sounds [Normal Rate and Rhythm] : normal rate and rhythm [2+] : left 2+ [Ankle Swelling (On Exam)] : not present [Abdomen Masses] : No abdominal masses [Alert] : alert [Oriented to Person] : oriented to person [Oriented to Place] : oriented to place [Calm] : calm [de-identified] : AAO x 3, NAD [de-identified] : small sized, soft left neck hematoma. 5mm area of dehiscence on superior aspect of wound, single suture placed

## 2019-01-23 NOTE — HISTORY OF PRESENT ILLNESS
[FreeTextEntry1] : 63 year old male s/p left carotid endarterectomy 4 days ago.\par He is on DAPT for recent PCI with YULIA.\par He developed a moderate sized left neck hematoma with oozing from his incision.\par He denies dysphagia, neck pain, dyspnea or noisy breathing.\par He otherwise feels well [de-identified] : No new issues, feels well\par Occasional incisional pain at night

## 2019-02-01 ENCOUNTER — APPOINTMENT (OUTPATIENT)
Dept: VASCULAR SURGERY | Facility: CLINIC | Age: 64
End: 2019-02-01
Payer: COMMERCIAL

## 2019-02-01 VITALS
OXYGEN SATURATION: 99 % | HEART RATE: 90 BPM | TEMPERATURE: 98.1 F | SYSTOLIC BLOOD PRESSURE: 100 MMHG | HEIGHT: 60 IN | BODY MASS INDEX: 30.23 KG/M2 | WEIGHT: 154 LBS | DIASTOLIC BLOOD PRESSURE: 62 MMHG

## 2019-02-01 PROCEDURE — 99024 POSTOP FOLLOW-UP VISIT: CPT

## 2019-02-01 NOTE — PHYSICAL EXAM
[JVD] : no jugular venous distention  [Normal Heart Sounds] : normal heart sounds [Normal Rate and Rhythm] : normal rate and rhythm [2+] : left 2+ [Ankle Swelling (On Exam)] : not present [Abdomen Masses] : No abdominal masses [Alert] : alert [Oriented to Person] : oriented to person [Oriented to Place] : oriented to place [Calm] : calm [de-identified] : AAO x 3, NAD [de-identified] : small sized, soft left neck hematoma. 5mm area of dehiscence on superior aspect of wound, single suture placed

## 2019-02-01 NOTE — ASSESSMENT
[FreeTextEntry1] : 63 year old male s/p left carotid endarterectomy 2 weeks ago\par He is on DAPT for recent PCI with YULIA.\par He developed a moderate sized left neck hematoma with oozing from his incision.\par Hematoma is resolving\par Continue plavix\par Will start Augmentin for erythema around incision\par Will see in office next week.

## 2019-02-05 ENCOUNTER — INPATIENT (INPATIENT)
Facility: HOSPITAL | Age: 64
LOS: 5 days | Discharge: HOME | DRG: 863 | End: 2019-02-11
Attending: SURGERY | Admitting: SURGERY
Payer: COMMERCIAL

## 2019-02-05 ENCOUNTER — APPOINTMENT (OUTPATIENT)
Dept: VASCULAR SURGERY | Facility: CLINIC | Age: 64
End: 2019-02-05
Payer: COMMERCIAL

## 2019-02-05 ENCOUNTER — TRANSCRIPTION ENCOUNTER (OUTPATIENT)
Age: 64
End: 2019-02-05

## 2019-02-05 VITALS
DIASTOLIC BLOOD PRESSURE: 78 MMHG | OXYGEN SATURATION: 98 % | SYSTOLIC BLOOD PRESSURE: 157 MMHG | TEMPERATURE: 98 F | HEART RATE: 70 BPM | RESPIRATION RATE: 20 BRPM

## 2019-02-05 VITALS
WEIGHT: 152 LBS | HEIGHT: 60 IN | DIASTOLIC BLOOD PRESSURE: 77 MMHG | OXYGEN SATURATION: 99 % | BODY MASS INDEX: 29.84 KG/M2 | SYSTOLIC BLOOD PRESSURE: 150 MMHG | HEART RATE: 71 BPM | TEMPERATURE: 98.2 F | RESPIRATION RATE: 14 BRPM

## 2019-02-05 DIAGNOSIS — Z98.890 OTHER SPECIFIED POSTPROCEDURAL STATES: Chronic | ICD-10-CM

## 2019-02-05 DIAGNOSIS — T81.49XA INFECTION FOLLOWING A PROCEDURE, OTHER SURGICAL SITE, INITIAL ENCOUNTER: ICD-10-CM

## 2019-02-05 LAB
ALBUMIN SERPL ELPH-MCNC: 3.9 G/DL — SIGNIFICANT CHANGE UP (ref 3.3–5.2)
ALP SERPL-CCNC: 84 U/L — SIGNIFICANT CHANGE UP (ref 40–120)
ALT FLD-CCNC: 17 U/L — SIGNIFICANT CHANGE UP
ANION GAP SERPL CALC-SCNC: 14 MMOL/L — SIGNIFICANT CHANGE UP (ref 5–17)
APTT BLD: 39.6 SEC — HIGH (ref 27.5–36.3)
AST SERPL-CCNC: 17 U/L — SIGNIFICANT CHANGE UP
BILIRUB SERPL-MCNC: 0.2 MG/DL — LOW (ref 0.4–2)
BLD GP AB SCN SERPL QL: SIGNIFICANT CHANGE UP
BUN SERPL-MCNC: 11 MG/DL — SIGNIFICANT CHANGE UP (ref 8–20)
CALCIUM SERPL-MCNC: 10.5 MG/DL — HIGH (ref 8.6–10.2)
CHLORIDE SERPL-SCNC: 107 MMOL/L — SIGNIFICANT CHANGE UP (ref 98–107)
CO2 SERPL-SCNC: 23 MMOL/L — SIGNIFICANT CHANGE UP (ref 22–29)
CREAT SERPL-MCNC: 0.69 MG/DL — SIGNIFICANT CHANGE UP (ref 0.5–1.3)
EOSINOPHIL NFR BLD AUTO: 8 % — HIGH (ref 0–6)
GLUCOSE SERPL-MCNC: 94 MG/DL — SIGNIFICANT CHANGE UP (ref 70–115)
HCT VFR BLD CALC: 38.2 % — LOW (ref 42–52)
HGB BLD-MCNC: 12.2 G/DL — LOW (ref 14–18)
INR BLD: 1.19 RATIO — HIGH (ref 0.88–1.16)
LYMPHOCYTES # BLD AUTO: 27 % — SIGNIFICANT CHANGE UP (ref 20–55)
MAGNESIUM SERPL-MCNC: 2 MG/DL — SIGNIFICANT CHANGE UP (ref 1.8–2.6)
MCHC RBC-ENTMCNC: 28.2 PG — SIGNIFICANT CHANGE UP (ref 27–31)
MCHC RBC-ENTMCNC: 31.9 G/DL — LOW (ref 32–36)
MCV RBC AUTO: 88.4 FL — SIGNIFICANT CHANGE UP (ref 80–94)
MONOCYTES NFR BLD AUTO: 4 % — SIGNIFICANT CHANGE UP (ref 3–10)
NEUTROPHILS NFR BLD AUTO: 57 % — SIGNIFICANT CHANGE UP (ref 37–73)
NEUTS BAND # BLD: 2 % — SIGNIFICANT CHANGE UP (ref 0–8)
PLAT MORPH BLD: NORMAL — SIGNIFICANT CHANGE UP
PLATELET # BLD AUTO: 366 K/UL — SIGNIFICANT CHANGE UP (ref 150–400)
POTASSIUM SERPL-MCNC: 4.3 MMOL/L — SIGNIFICANT CHANGE UP (ref 3.5–5.3)
POTASSIUM SERPL-SCNC: 4.3 MMOL/L — SIGNIFICANT CHANGE UP (ref 3.5–5.3)
PROT SERPL-MCNC: 7.3 G/DL — SIGNIFICANT CHANGE UP (ref 6.6–8.7)
PROTHROM AB SERPL-ACNC: 13.8 SEC — HIGH (ref 10–12.9)
RBC # BLD: 4.32 M/UL — LOW (ref 4.6–6.2)
RBC # FLD: 13.5 % — SIGNIFICANT CHANGE UP (ref 11–15.6)
RBC BLD AUTO: NORMAL — SIGNIFICANT CHANGE UP
SODIUM SERPL-SCNC: 144 MMOL/L — SIGNIFICANT CHANGE UP (ref 135–145)
TYPE + AB SCN PNL BLD: SIGNIFICANT CHANGE UP
VARIANT LYMPHS # BLD: 2 % — SIGNIFICANT CHANGE UP (ref 0–6)
WBC # BLD: 8.5 K/UL — SIGNIFICANT CHANGE UP (ref 4.8–10.8)
WBC # FLD AUTO: 8.5 K/UL — SIGNIFICANT CHANGE UP (ref 4.8–10.8)

## 2019-02-05 PROCEDURE — 99223 1ST HOSP IP/OBS HIGH 75: CPT

## 2019-02-05 PROCEDURE — 71045 X-RAY EXAM CHEST 1 VIEW: CPT | Mod: 26

## 2019-02-05 PROCEDURE — 93010 ELECTROCARDIOGRAM REPORT: CPT

## 2019-02-05 PROCEDURE — 99024 POSTOP FOLLOW-UP VISIT: CPT

## 2019-02-05 PROCEDURE — 99285 EMERGENCY DEPT VISIT HI MDM: CPT

## 2019-02-05 RX ORDER — SENNA PLUS 8.6 MG/1
2 TABLET ORAL AT BEDTIME
Qty: 0 | Refills: 0 | Status: DISCONTINUED | OUTPATIENT
Start: 2019-02-05 | End: 2019-02-06

## 2019-02-05 RX ORDER — CLOPIDOGREL BISULFATE 75 MG/1
75 TABLET, FILM COATED ORAL DAILY
Qty: 0 | Refills: 0 | Status: DISCONTINUED | OUTPATIENT
Start: 2019-02-05 | End: 2019-02-06

## 2019-02-05 RX ORDER — OXYCODONE AND ACETAMINOPHEN 5; 325 MG/1; MG/1
1 TABLET ORAL EVERY 4 HOURS
Qty: 0 | Refills: 0 | Status: DISCONTINUED | OUTPATIENT
Start: 2019-02-05 | End: 2019-02-06

## 2019-02-05 RX ORDER — SODIUM CHLORIDE 9 MG/ML
3 INJECTION INTRAMUSCULAR; INTRAVENOUS; SUBCUTANEOUS EVERY 8 HOURS
Qty: 0 | Refills: 0 | Status: DISCONTINUED | OUTPATIENT
Start: 2019-02-05 | End: 2019-02-06

## 2019-02-05 RX ORDER — VANCOMYCIN HCL 1 G
1000 VIAL (EA) INTRAVENOUS EVERY 8 HOURS
Qty: 0 | Refills: 0 | Status: DISCONTINUED | OUTPATIENT
Start: 2019-02-05 | End: 2019-02-06

## 2019-02-05 RX ORDER — SIMVASTATIN 20 MG/1
20 TABLET, FILM COATED ORAL AT BEDTIME
Qty: 0 | Refills: 0 | Status: DISCONTINUED | OUTPATIENT
Start: 2019-02-05 | End: 2019-02-06

## 2019-02-05 RX ORDER — PIPERACILLIN AND TAZOBACTAM 4; .5 G/20ML; G/20ML
3.38 INJECTION, POWDER, LYOPHILIZED, FOR SOLUTION INTRAVENOUS ONCE
Qty: 0 | Refills: 0 | Status: COMPLETED | OUTPATIENT
Start: 2019-02-05 | End: 2019-02-05

## 2019-02-05 RX ORDER — DOCUSATE SODIUM 100 MG
100 CAPSULE ORAL THREE TIMES A DAY
Qty: 0 | Refills: 0 | Status: DISCONTINUED | OUTPATIENT
Start: 2019-02-05 | End: 2019-02-06

## 2019-02-05 RX ORDER — VANCOMYCIN HCL 1 G
VIAL (EA) INTRAVENOUS
Qty: 0 | Refills: 0 | Status: DISCONTINUED | OUTPATIENT
Start: 2019-02-05 | End: 2019-02-05

## 2019-02-05 RX ORDER — PIPERACILLIN AND TAZOBACTAM 4; .5 G/20ML; G/20ML
3.38 INJECTION, POWDER, LYOPHILIZED, FOR SOLUTION INTRAVENOUS EVERY 8 HOURS
Qty: 0 | Refills: 0 | Status: DISCONTINUED | OUTPATIENT
Start: 2019-02-05 | End: 2019-02-06

## 2019-02-05 RX ORDER — SODIUM CHLORIDE 9 MG/ML
1000 INJECTION INTRAMUSCULAR; INTRAVENOUS; SUBCUTANEOUS
Qty: 0 | Refills: 0 | Status: DISCONTINUED | OUTPATIENT
Start: 2019-02-05 | End: 2019-02-06

## 2019-02-05 RX ADMIN — SODIUM CHLORIDE 75 MILLILITER(S): 9 INJECTION INTRAMUSCULAR; INTRAVENOUS; SUBCUTANEOUS at 21:14

## 2019-02-05 RX ADMIN — Medication 100 MILLIGRAM(S): at 21:14

## 2019-02-05 RX ADMIN — SODIUM CHLORIDE 3 MILLILITER(S): 9 INJECTION INTRAMUSCULAR; INTRAVENOUS; SUBCUTANEOUS at 16:30

## 2019-02-05 RX ADMIN — PIPERACILLIN AND TAZOBACTAM 200 GRAM(S): 4; .5 INJECTION, POWDER, LYOPHILIZED, FOR SOLUTION INTRAVENOUS at 16:29

## 2019-02-05 RX ADMIN — SODIUM CHLORIDE 3 MILLILITER(S): 9 INJECTION INTRAMUSCULAR; INTRAVENOUS; SUBCUTANEOUS at 21:19

## 2019-02-05 RX ADMIN — SIMVASTATIN 20 MILLIGRAM(S): 20 TABLET, FILM COATED ORAL at 21:14

## 2019-02-05 RX ADMIN — OXYCODONE AND ACETAMINOPHEN 1 TABLET(S): 5; 325 TABLET ORAL at 23:00

## 2019-02-05 RX ADMIN — Medication 250 MILLIGRAM(S): at 19:11

## 2019-02-05 NOTE — H&P ADULT - PSH
History of left-sided carotid endarterectomy    right hernia repair    S/P AAA repair  2010  S/P CABG (Coronary Artery Bypass Graft)  5/2009 x 4 vessels  S/P Tonsillectomy

## 2019-02-05 NOTE — ED ADULT NURSE REASSESSMENT NOTE - NS ED NURSE REASSESS COMMENT FT1
Pt is resting in bed comfortably at this time, no apparent distress noted at this time. pt safety maintained. pt provided stool sample at this time. stool is not loose. spouse, daughter and grandchild at bedside. Pt denies any complaints at this time. pt educated on plan of care, plan of care taught back to RN. plan of care education deemed proficient through successful teach back. will continue to reeducate pt regarding plan of care.

## 2019-02-05 NOTE — ED PROVIDER NOTE - MEDICAL DECISION MAKING DETAILS
Pt with infected hematoma to L neck s/p carotid endarterectomy.  Vascular PA evaluated patient in ER and will admit to DR. Burciaga.

## 2019-02-05 NOTE — H&P ADULT - FAMILY HISTORY
Grandparent  Still living? No  Family history of myocardial infarction, Age at diagnosis: Age Unknown
9

## 2019-02-05 NOTE — ED ADULT NURSE REASSESSMENT NOTE - NS ED NURSE REASSESS COMMENT FT1
Report received from off going RN, charting as noted. Patient A&Ox4 complaining of neck discomfort. Respirations even & unlabored, denies any numbness or tingling. Denies any chest pain, shortness of breath, nausea or dizziness. Saline lock in place, patent, negative s/s phlebitis or infiltration. IVF administered as ordered. All PM medications administered as ordered, well tolerated. Family at bedside. Plan of care discussed, all questions answered. Will monitor. Report received from off going RN, charting as noted. Patient A&Ox4 complaining of neck discomfort. Dressing in place, positive serosanguineous drainage. Dressing changed. Respirations even & unlabored, denies any numbness or tingling. Denies any chest pain, shortness of breath, nausea or dizziness. Saline lock in place, patent, negative s/s phlebitis or infiltration. IVF administered as ordered. All PM medications administered as ordered, well tolerated. Family at bedside. Plan of care discussed, all questions answered. Will monitor.

## 2019-02-05 NOTE — ED ADULT NURSE NOTE - NSIMPLEMENTINTERV_GEN_ALL_ED
Implemented All Fall Risk Interventions:  [place] to call system. Call bell, personal items and telephone within reach. Instruct patient to call for assistance. Room bathroom lighting operational. Non-slip footwear when patient is off stretcher. Physically safe environment: no spills, clutter or unnecessary equipment. Stretcher in lowest position, wheels locked, appropriate side rails in place. Provide visual cue, wrist band, yellow gown, etc. Monitor gait and stability. Monitor for mental status changes and reorient to person, place, and time. Review medications for side effects contributing to fall risk. Reinforce activity limits and safety measures with patient and family.

## 2019-02-05 NOTE — CONSULT NOTE ADULT - SUBJECTIVE AND OBJECTIVE BOX
Patient is a 63y old  Male who presents with a chief complaint of Infected L neck hematoma (05 Feb 2019 13:48)      HPI: 64 y/o M PMHx of CAD s/p CABG x 4 (2009) s/p PCI/YULIA (most recent 12/2018 YULIA to May), AAA s/p endovascular repair, TIA, HTN, HLD, carotid stenosis s/p left CEA (01/11/19) who was sent to ED from Dr. Burciaga's office due to an infected left neck hematoma. Patient began having bleeding and hematoma shortly after the procedure, and it has continued with now associated purulence for the last week. Patient was seen and started on Amoxicililn last week, but began experiencing some fevers and chills later that night. Patient states that starting yesterday mandi purulent discharge became worse and the incision looked even worse. Patient saw Dr. Burciaga today who opened the incision and an old clot was expressed, patient was sent to the ED for IV antibiotics and washout of the left neck. Patient states that he has otherwise been feeling well. Patient METS>4, no chest pain or BECKHAM. Patient denies any CP, SOB, BECKHAM, palpitations, syncope, near syncope, orthopnea, LE edema, PND, abdominal pain, N/V/D, hematuria, melena, or BRBPR.       PAST MEDICAL & SURGICAL HISTORY:  Hematoma  TIA (transient ischemic attack): 11/2018  Stented coronary artery: 12/10/2018   D1  TIA (transient ischemic attack): 11/2018  Carotid stenosis, left  Hyperlipidemia  AAA (Abdominal Aortic Aneurysm): Dx. 5/2009- attempted repair  12/2009 surgery aborted due to hypotension.r  HTN (Hypertension), Benign  ADD (Attention Deficit Disorder)  CABG (Coronary Artery Bypass Graft)x4 6/09  CAD (Coronary Artery Disease)  History of left-sided carotid endarterectomy  S/P AAA repair: 2010  S/P CABG (Coronary Artery Bypass Graft): 5/2009 x 4 vessels  right hernia repair  S/P Tonsillectomy      PREVIOUS DIAGNOSTIC TESTING:      ECHO  FINDINGS:  < from: Transthoracic Echocardiogram w/ Doppler (12.18.09 @ 15:38) >  ------------------------------------------------------------------------  Conclusions:  1. Normal mitral valve. Minimal mitral regurgitation.  2. Calcified trileaflet aortic valve with normal opening.  No  aortic valve regurgitation seen.  3. Mild left atrial enlargement.  4. Normal left ventricular internal dimensions and wall  thicknesses.  5. Mild segmental left ventricular dysfunction. EF=45-50%.  The septum is severely hypokinetic.  6. Normal right ventricular size and systolic function.  ***Compared with intra-operative GABRIELA performed earlier  today, overall LV function has improved.  ------------------------------------------------------------------------  Confirmed on  12/18/2009 - 19:15:26 by Zenaida Martinez M.D.    < end of copied text >      Allergies    No Known Allergies    Intolerances    MEDICATIONS  (STANDING):  clopidogrel Tablet 75 milliGRAM(s) Oral daily  docusate sodium 100 milliGRAM(s) Oral three times a day  multivitamin 1 Tablet(s) Oral daily  piperacillin/tazobactam IVPB. 3.375 Gram(s) IV Intermittent every 8 hours  simvastatin 20 milliGRAM(s) Oral at bedtime  sodium chloride 0.9% lock flush 3 milliLiter(s) IV Push every 8 hours  sodium chloride 0.9%. 1000 milliLiter(s) (75 mL/Hr) IV Continuous <Continuous>  vancomycin  IVPB 1000 milliGRAM(s) IV Intermittent every 8 hours    MEDICATIONS  (PRN):  oxyCODONE    5 mG/acetaminophen 325 mG 1 Tablet(s) Oral every 4 hours PRN Moderate Pain (4 - 6)  senna 2 Tablet(s) Oral at bedtime PRN Constipation    Home Medications:  Colace 100 mg oral capsule:  (11 Jan 2019 10:22)  Metoprolol Succinate ER 25 mg oral tablet, extended release: 1 tab(s) orally once a day (at bedtime) (11 Jan 2019 10:22)  rosuvastatin 10 mg oral tablet: 1 tab(s) orally once a day (at bedtime) (11 Jan 2019 10:22)    FAMILY HISTORY:  Family history of myocardial infarction (Grandparent)      SOCIAL HISTORY:    CIGARETTES: Former smoker, quit 11/2018. Smoked 1/2 ppd for 3 years after quitting, then 1 ppd x 40 years history    ALCOHOL: Socially    REVIEW OF SYSTEMS:  CONSTITUTIONAL: AS PER HPI  EYES: No eye pain, visual disturbances, or discharge  ENMT:  No difficulty hearing, tinnitus, vertigo; No sinus or throat pain  NECK: AS PER HPI  RESPIRATORY: No cough, wheezing, chills or hemoptysis; No Shortness of Breath  CARDIOVASCULAR: No chest pain, palpitations, passing out, dizziness, or leg swelling  GASTROINTESTINAL: No abdominal or epigastric pain. No nausea, vomiting, or hematemesis; No diarrhea or constipation. No melena or hematochezia.  GENITOURINARY: No dysuria, frequency, hematuria, or incontinence  NEUROLOGICAL: No headaches, memory loss, loss of strength, numbness, or tremors  SKIN: AS PER HPI  LYMPH Nodes: No enlarged glands  ENDOCRINE: No heat or cold intolerance; No hair loss  MUSCULOSKELETAL: No joint pain or swelling; No muscle, back, or extremity pain  PSYCHIATRIC: No depression, anxiety, mood swings, or difficulty sleeping  HEME/LYMPH: No easy bruising, or bleeding gums  ALLERY AND IMMUNOLOGIC: No hives or eczema	   ALL OTHER REVIEW OF SYSTEMS ARE NEGATIVE.    Vital Signs Last 24 Hrs  T(C): 36.8 (05 Feb 2019 13:30), Max: 36.8 (05 Feb 2019 13:30)  T(F): 98.3 (05 Feb 2019 13:30), Max: 98.3 (05 Feb 2019 13:30)  HR: 70 (05 Feb 2019 13:30) (70 - 70)  BP: 157/78 (05 Feb 2019 13:30) (157/78 - 157/78)  RR: 20 (05 Feb 2019 13:30) (20 - 20)  SpO2: 98% (05 Feb 2019 13:30) (98% - 98%)    PHYSICAL EXAM:  Appearance: Normal, well nourished	  HEENT:   Normal oral mucosa, PERRL, EOMI, sclera non-icteric  Neck: +hematoma with purulent drainage from left incision site   Cardiovascular: Normal S1 S2, No JVD, II/VI systolic murmur lsb, No peripheral edema  Respiratory: Lungs clear to auscultation	  Psychiatry: A & O x 3, Mood & affect appropriate  Gastrointestinal:  Soft, Non-tender, + BS, no bruits	  Skin: No rashes, No ecchymoses, No cyanosis  Neurologic: Grossly non-focal with full strength in all four extremities  Extremities: Normal range of motion, No clubbing, cyanosis or edema  Vascular: Peripheral pulses palpable 1+ bilaterally      INTERPRETATION OF TELEMETRY:     ECG: NSR, old anterior infarct     LABS:                        12.2   8.5   )-----------( 366      ( 05 Feb 2019 16:25 )             38.2     02-05    144  |  107  |  11.0  ----------------------------<  94  4.3   |  23.0  |  0.69    Ca    10.5<H>      05 Feb 2019 16:25  Mg     2.0     02-05    TPro  7.3  /  Alb  3.9  /  TBili  0.2<L>  /  DBili  x   /  AST  17  /  ALT  17  /  AlkPhos  84  02-05        PT/INR - ( 05 Feb 2019 16:25 )   PT: 13.8 sec;   INR: 1.19 ratio         PTT - ( 05 Feb 2019 16:25 )  PTT:39.6 sec    I&O's Summary    BNP    RADIOLOGY & ADDITIONAL STUDIES:

## 2019-02-05 NOTE — H&P ADULT - PMH
AAA (Abdominal Aortic Aneurysm)  Dx. 5/2009- attempted repair  12/2009 surgery aborted due to hypotension.r  ADD (Attention Deficit Disorder)    CABG (Coronary Artery Bypass Graft)x4 6/09    CAD (Coronary Artery Disease)    Carotid stenosis, left    Hematoma    HTN (Hypertension), Benign    Hyperlipidemia    Stented coronary artery  12/10/2018         D1  TIA (transient ischemic attack)  11/2018  TIA (transient ischemic attack)  11/2018

## 2019-02-05 NOTE — ED PROVIDER NOTE - PHYSICAL EXAMINATION
Constitutional - well-developed; well nourished. Head - NCAT. Airway patent. Eyes - PERRL. CV - RRR. no murmur. no edema. Pulm - CTAB. Abd - soft, nt. no rebound. no guarding. Neuro - A&Ox3. strength 5/5 x4. sensation intact x4. normal gait. Skin - No rash. MSK - normal ROM.     L neck with wound and hematoma

## 2019-02-05 NOTE — ED ADULT NURSE NOTE - OBJECTIVE STATEMENT
pt care assumed at 1700, no apparent distress noted at this time. pt received Alert and Oriented to person, place, situation and time sitting in bed comfortably with wife and anesthesiologist at bedside for eval. pt c/o left neck wound opening up 2 days ago. pt had sx 1 month ago on left neck. wound is not bleeding at this time but has brown/green discharge from wound. wound is about 15cm. RN changed dressing at this time. pt is currently receiving IV medication at this time. HR is regular, lung sounds are clear b/l, abd is soft and nontender with positive bowel sounds in all four quadrants, skin is warm, dry and appropriate for age and race. pt educated on plan of care, plan of care taught back to RN. proficiency determined from successful pt teach back. will continue to educate pt throughout ED stay.

## 2019-02-05 NOTE — CONSULT NOTE ADULT - ASSESSMENT
A/P:  62 y/o M PMHx of CAD s/p CABG x 4 (2009) s/p PCI/YULIA (most recent 12/2018 YULIA to Diag), AAA s/p endovascular repair, TIA, HTN, HLD, carotid stenosis s/p left CEA (01/11/19) who was sent to ED from Dr. Burciaga's office due to an infected left neck hematoma. Troponin pending. EKG unchanged     1. Dany-operative Cardiac Risk Stratification   - RCRI risk 6.6%  - Underwent recent CEA without issue  - No active chest pain, evidence of ischemia, decompensated CHF, significant valvular abnormality, or unstable arrhythmia and therefore no absolute cardiac contraindication to the planned surgical procedure.   - Continue beta blockade dany-operatively  - Continue plavix  - Restart aspirin ASAP as patient needs DAPT for recent stent    2. CAD  - S/p YULIA to D1 12/2018  - On plavix, aspirin on hold, need to restart ASAP  - Continue lipitor and beta blockade

## 2019-02-05 NOTE — H&P ADULT - ASSESSMENT
62 y/o M with L neck infected hematoma  Admit to vascular surgery  IV vanco  NPO p MN for OR tomorrow- evac/washout L neck hematoma  Cardiology called for preop risk stratification  Cont Plavix statin and BB

## 2019-02-05 NOTE — ED PROVIDER NOTE - OBJECTIVE STATEMENT
Pt is a 62 yo M co L neck pain/swelling. PMHx significant for cad s/p cabg, AAA, htn, hld. PT states that approx. 1 mo ago at [facility] Dr. Burciaga did a L carotid endarterectomy. Pt states that since then he has had pain and swelling to the L-side of his neck until 2 d ago the wound opened and started bleeding again. He went to DR. Burciaga today who drained the wound but it continued to swell and bleed so he sent him to the ER. no trouble speaking, breathing or swallowing currently. no other complaints.

## 2019-02-05 NOTE — CONSULT NOTE ADULT - ATTENDING COMMENTS
I reviewed the above and d/w PA in length. S/P CEA with hematoma and purulent discharge. Pt will need to go to OR.  He is s/p recent PCI in December 2018. He is off aspirin and only on plavix now. We have asked for troponin. TTE is also ordered. In general he is at moderate risk for cardiac complications with proposed procedure. He is stable to proceed if troponin is negative. It is of most importance to resume dual antiplatelet therapy as soon as possible with aspirin and plavix. continue with home medications including statin therapy.   I agree with a/p.

## 2019-02-05 NOTE — H&P ADULT - HISTORY OF PRESENT ILLNESS
64 y/o M with recent L CEA by Dr Burciaga on 1/11/19 presents with bleeding from L neck after seeing Dr Burciaga in vascular office this am. Pt had post op complications of L neck hematoma and bleeding for which he was followed closely as an outpt by Dr Burciaga. Seen in office today and noted with increased swelling and erythema and had incision opened by Dr Burciaga in which old clot was expressed. Pt presented to ED for admission for initiation of IV antibiotics and OR scheduled for evacuation of hematoma and washout L neck.

## 2019-02-05 NOTE — ED PROVIDER NOTE - NS ED ROS FT
No fever/chills, No photophobia/eye pain/changes in vision, No ear pain/sore throat/dysphagia, No chest pain/palpitations, no SOB/cough/wheeze/stridor, No abdominal pain, No N/V/D, no dysuria/frequency/discharge, No back pain, no rash, no changes in neurological status/function.   +neck pain/swelling

## 2019-02-05 NOTE — H&P ADULT - NSHPREVIEWOFSYSTEMS_GEN_ALL_CORE
REVIEW OF SYSTEMS  General:	denies fever, chills, malaise  Skin/Breast: swelling L neck	  Ophthalmologic: no blurred vision, double vision	  ENMT: dry  Respiratory and Thorax: denies SOB or cough	  Cardiovascular: denies CP or palps  Gastrointestinal: freq loose stools  Genitourinary: denies  Musculoskeletal:	denies  Neurological: denies  Psychiatric: denies  Hematology/Lymphatics: denies  Endocrine: denies  Allergic/Immunologic: denies

## 2019-02-06 ENCOUNTER — RESULT REVIEW (OUTPATIENT)
Age: 64
End: 2019-02-06

## 2019-02-06 LAB
ANION GAP SERPL CALC-SCNC: 12 MMOL/L — SIGNIFICANT CHANGE UP (ref 5–17)
APTT BLD: 37.8 SEC — HIGH (ref 27.5–36.3)
BASOPHILS # BLD AUTO: 0.1 K/UL — SIGNIFICANT CHANGE UP (ref 0–0.2)
BASOPHILS NFR BLD AUTO: 0.6 % — SIGNIFICANT CHANGE UP (ref 0–2)
BUN SERPL-MCNC: 16 MG/DL — SIGNIFICANT CHANGE UP (ref 8–20)
CALCIUM SERPL-MCNC: 9.9 MG/DL — SIGNIFICANT CHANGE UP (ref 8.6–10.2)
CHLORIDE SERPL-SCNC: 106 MMOL/L — SIGNIFICANT CHANGE UP (ref 98–107)
CO2 SERPL-SCNC: 23 MMOL/L — SIGNIFICANT CHANGE UP (ref 22–29)
CREAT SERPL-MCNC: 1.02 MG/DL — SIGNIFICANT CHANGE UP (ref 0.5–1.3)
EOSINOPHIL # BLD AUTO: 0.3 K/UL — SIGNIFICANT CHANGE UP (ref 0–0.5)
EOSINOPHIL NFR BLD AUTO: 3.4 % — SIGNIFICANT CHANGE UP (ref 0–5)
GLUCOSE SERPL-MCNC: 118 MG/DL — HIGH (ref 70–115)
HCT VFR BLD CALC: 33 % — LOW (ref 42–52)
HGB BLD-MCNC: 10.9 G/DL — LOW (ref 14–18)
INR BLD: 1.25 RATIO — HIGH (ref 0.88–1.16)
LYMPHOCYTES # BLD AUTO: 2.6 K/UL — SIGNIFICANT CHANGE UP (ref 1–4.8)
LYMPHOCYTES # BLD AUTO: 27.8 % — SIGNIFICANT CHANGE UP (ref 20–55)
MAGNESIUM SERPL-MCNC: 2 MG/DL — SIGNIFICANT CHANGE UP (ref 1.6–2.6)
MCHC RBC-ENTMCNC: 29.2 PG — SIGNIFICANT CHANGE UP (ref 27–31)
MCHC RBC-ENTMCNC: 33 G/DL — SIGNIFICANT CHANGE UP (ref 32–36)
MCV RBC AUTO: 88.5 FL — SIGNIFICANT CHANGE UP (ref 80–94)
MONOCYTES # BLD AUTO: 0.8 K/UL — SIGNIFICANT CHANGE UP (ref 0–0.8)
MONOCYTES NFR BLD AUTO: 8.6 % — SIGNIFICANT CHANGE UP (ref 3–10)
NEUTROPHILS # BLD AUTO: 5.6 K/UL — SIGNIFICANT CHANGE UP (ref 1.8–8)
NEUTROPHILS NFR BLD AUTO: 59.3 % — SIGNIFICANT CHANGE UP (ref 37–73)
PHOSPHATE SERPL-MCNC: 3.7 MG/DL — SIGNIFICANT CHANGE UP (ref 2.4–4.7)
PLATELET # BLD AUTO: 280 K/UL — SIGNIFICANT CHANGE UP (ref 150–400)
POTASSIUM SERPL-MCNC: 4 MMOL/L — SIGNIFICANT CHANGE UP (ref 3.5–5.3)
POTASSIUM SERPL-SCNC: 4 MMOL/L — SIGNIFICANT CHANGE UP (ref 3.5–5.3)
PROTHROM AB SERPL-ACNC: 14.5 SEC — HIGH (ref 10–12.9)
RBC # BLD: 3.73 M/UL — LOW (ref 4.6–6.2)
RBC # FLD: 13.4 % — SIGNIFICANT CHANGE UP (ref 11–15.6)
SODIUM SERPL-SCNC: 141 MMOL/L — SIGNIFICANT CHANGE UP (ref 135–145)
TROPONIN T SERPL-MCNC: <0.01 NG/ML — SIGNIFICANT CHANGE UP (ref 0–0.06)
WBC # BLD: 9.5 K/UL — SIGNIFICANT CHANGE UP (ref 4.8–10.8)
WBC # FLD AUTO: 9.5 K/UL — SIGNIFICANT CHANGE UP (ref 4.8–10.8)

## 2019-02-06 PROCEDURE — 10140 I&D HMTMA SEROMA/FLUID COLLJ: CPT

## 2019-02-06 PROCEDURE — 11043 DBRDMT MUSC&/FSCA 1ST 20/<: CPT

## 2019-02-06 PROCEDURE — 70498 CT ANGIOGRAPHY NECK: CPT | Mod: 26

## 2019-02-06 PROCEDURE — 88304 TISSUE EXAM BY PATHOLOGIST: CPT | Mod: 26

## 2019-02-06 RX ORDER — OXYCODONE AND ACETAMINOPHEN 5; 325 MG/1; MG/1
1 TABLET ORAL EVERY 4 HOURS
Qty: 0 | Refills: 0 | Status: DISCONTINUED | OUTPATIENT
Start: 2019-02-06 | End: 2019-02-06

## 2019-02-06 RX ORDER — METOPROLOL TARTRATE 50 MG
5 TABLET ORAL EVERY 6 HOURS
Qty: 0 | Refills: 0 | Status: DISCONTINUED | OUTPATIENT
Start: 2019-02-06 | End: 2019-02-07

## 2019-02-06 RX ORDER — SODIUM CHLORIDE 9 MG/ML
1000 INJECTION, SOLUTION INTRAVENOUS
Qty: 0 | Refills: 0 | Status: DISCONTINUED | OUTPATIENT
Start: 2019-02-06 | End: 2019-02-07

## 2019-02-06 RX ORDER — ROSUVASTATIN CALCIUM 5 MG/1
10 TABLET ORAL AT BEDTIME
Qty: 0 | Refills: 0 | Status: DISCONTINUED | OUTPATIENT
Start: 2019-02-06 | End: 2019-02-11

## 2019-02-06 RX ORDER — ACETAMINOPHEN 500 MG
650 TABLET ORAL EVERY 6 HOURS
Qty: 0 | Refills: 0 | Status: DISCONTINUED | OUTPATIENT
Start: 2019-02-06 | End: 2019-02-11

## 2019-02-06 RX ORDER — FENTANYL CITRATE 50 UG/ML
50 INJECTION INTRAVENOUS
Qty: 0 | Refills: 0 | Status: DISCONTINUED | OUTPATIENT
Start: 2019-02-06 | End: 2019-02-07

## 2019-02-06 RX ORDER — ASPIRIN/CALCIUM CARB/MAGNESIUM 324 MG
81 TABLET ORAL DAILY
Qty: 0 | Refills: 0 | Status: DISCONTINUED | OUTPATIENT
Start: 2019-02-06 | End: 2019-02-11

## 2019-02-06 RX ORDER — METOPROLOL TARTRATE 50 MG
25 TABLET ORAL EVERY 24 HOURS
Qty: 0 | Refills: 0 | Status: DISCONTINUED | OUTPATIENT
Start: 2019-02-06 | End: 2019-02-11

## 2019-02-06 RX ORDER — SODIUM CHLORIDE 9 MG/ML
1000 INJECTION, SOLUTION INTRAVENOUS ONCE
Qty: 0 | Refills: 0 | Status: COMPLETED | OUTPATIENT
Start: 2019-02-06 | End: 2019-02-06

## 2019-02-06 RX ORDER — PIPERACILLIN AND TAZOBACTAM 4; .5 G/20ML; G/20ML
3.38 INJECTION, POWDER, LYOPHILIZED, FOR SOLUTION INTRAVENOUS EVERY 8 HOURS
Qty: 0 | Refills: 0 | Status: DISCONTINUED | OUTPATIENT
Start: 2019-02-06 | End: 2019-02-09

## 2019-02-06 RX ORDER — ONDANSETRON 8 MG/1
4 TABLET, FILM COATED ORAL ONCE
Qty: 0 | Refills: 0 | Status: DISCONTINUED | OUTPATIENT
Start: 2019-02-06 | End: 2019-02-07

## 2019-02-06 RX ORDER — VANCOMYCIN HCL 1 G
1000 VIAL (EA) INTRAVENOUS EVERY 12 HOURS
Qty: 0 | Refills: 0 | Status: DISCONTINUED | OUTPATIENT
Start: 2019-02-06 | End: 2019-02-07

## 2019-02-06 RX ORDER — SODIUM CHLORIDE 9 MG/ML
1000 INJECTION, SOLUTION INTRAVENOUS
Qty: 0 | Refills: 0 | Status: DISCONTINUED | OUTPATIENT
Start: 2019-02-06 | End: 2019-02-06

## 2019-02-06 RX ORDER — SENNA PLUS 8.6 MG/1
2 TABLET ORAL AT BEDTIME
Qty: 0 | Refills: 0 | Status: DISCONTINUED | OUTPATIENT
Start: 2019-02-06 | End: 2019-02-11

## 2019-02-06 RX ORDER — SIMVASTATIN 20 MG/1
20 TABLET, FILM COATED ORAL AT BEDTIME
Qty: 0 | Refills: 0 | Status: DISCONTINUED | OUTPATIENT
Start: 2019-02-06 | End: 2019-02-06

## 2019-02-06 RX ORDER — OXYCODONE HYDROCHLORIDE 5 MG/1
5 TABLET ORAL EVERY 4 HOURS
Qty: 0 | Refills: 0 | Status: DISCONTINUED | OUTPATIENT
Start: 2019-02-06 | End: 2019-02-11

## 2019-02-06 RX ORDER — DOCUSATE SODIUM 100 MG
100 CAPSULE ORAL THREE TIMES A DAY
Qty: 0 | Refills: 0 | Status: DISCONTINUED | OUTPATIENT
Start: 2019-02-06 | End: 2019-02-11

## 2019-02-06 RX ADMIN — FENTANYL CITRATE 50 MICROGRAM(S): 50 INJECTION INTRAVENOUS at 21:30

## 2019-02-06 RX ADMIN — Medication 25 MILLIGRAM(S): at 22:36

## 2019-02-06 RX ADMIN — OXYCODONE HYDROCHLORIDE 5 MILLIGRAM(S): 5 TABLET ORAL at 22:24

## 2019-02-06 RX ADMIN — SODIUM CHLORIDE 3 MILLILITER(S): 9 INJECTION INTRAMUSCULAR; INTRAVENOUS; SUBCUTANEOUS at 06:01

## 2019-02-06 RX ADMIN — FENTANYL CITRATE 50 MICROGRAM(S): 50 INJECTION INTRAVENOUS at 20:30

## 2019-02-06 RX ADMIN — FENTANYL CITRATE 50 MICROGRAM(S): 50 INJECTION INTRAVENOUS at 22:11

## 2019-02-06 RX ADMIN — Medication 5 MILLIGRAM(S): at 20:25

## 2019-02-06 RX ADMIN — Medication 250 MILLIGRAM(S): at 06:00

## 2019-02-06 RX ADMIN — PIPERACILLIN AND TAZOBACTAM 25 GRAM(S): 4; .5 INJECTION, POWDER, LYOPHILIZED, FOR SOLUTION INTRAVENOUS at 13:09

## 2019-02-06 RX ADMIN — Medication 250 MILLIGRAM(S): at 16:28

## 2019-02-06 RX ADMIN — Medication 100 MILLIGRAM(S): at 22:36

## 2019-02-06 RX ADMIN — OXYCODONE AND ACETAMINOPHEN 1 TABLET(S): 5; 325 TABLET ORAL at 00:38

## 2019-02-06 RX ADMIN — PIPERACILLIN AND TAZOBACTAM 25 GRAM(S): 4; .5 INJECTION, POWDER, LYOPHILIZED, FOR SOLUTION INTRAVENOUS at 00:37

## 2019-02-06 RX ADMIN — ROSUVASTATIN CALCIUM 10 MILLIGRAM(S): 5 TABLET ORAL at 22:53

## 2019-02-06 RX ADMIN — PIPERACILLIN AND TAZOBACTAM 25 GRAM(S): 4; .5 INJECTION, POWDER, LYOPHILIZED, FOR SOLUTION INTRAVENOUS at 22:12

## 2019-02-06 RX ADMIN — FENTANYL CITRATE 50 MICROGRAM(S): 50 INJECTION INTRAVENOUS at 20:20

## 2019-02-06 RX ADMIN — SODIUM CHLORIDE 3 MILLILITER(S): 9 INJECTION INTRAMUSCULAR; INTRAVENOUS; SUBCUTANEOUS at 15:05

## 2019-02-06 RX ADMIN — FENTANYL CITRATE 50 MICROGRAM(S): 50 INJECTION INTRAVENOUS at 22:30

## 2019-02-06 RX ADMIN — SODIUM CHLORIDE 1000 MILLILITER(S): 9 INJECTION, SOLUTION INTRAVENOUS at 13:09

## 2019-02-06 RX ADMIN — SODIUM CHLORIDE 100 MILLILITER(S): 9 INJECTION, SOLUTION INTRAVENOUS at 15:06

## 2019-02-06 RX ADMIN — FENTANYL CITRATE 50 MICROGRAM(S): 50 INJECTION INTRAVENOUS at 21:23

## 2019-02-06 RX ADMIN — OXYCODONE HYDROCHLORIDE 5 MILLIGRAM(S): 5 TABLET ORAL at 23:00

## 2019-02-06 NOTE — BRIEF OPERATIVE NOTE - PROCEDURE
<<-----Click on this checkbox to enter Procedure Exploration of neck  02/06/2019  Exploration of left neck, debridement, washout and irrigation of infected hematoma  Active  [email or web address]

## 2019-02-06 NOTE — BRIEF OPERATIVE NOTE - PRE-OP DX
Hematoma of neck, initial encounter  02/06/2019  Infected hematoma of left neck  Active  Alejandro Bolanos

## 2019-02-06 NOTE — BRIEF OPERATIVE NOTE - COMMENTS
Penrose drain in place sutured to skin. Dressed with dry gauze and island dressing  Wound Class IV  Surg Start: 18:36  Surg End: 19:39

## 2019-02-06 NOTE — ED ADULT NURSE REASSESSMENT NOTE - NS ED NURSE REASSESS COMMENT FT1
Patient asleep, negative obvious pain, discomfort or distress, respirations even/unlabored. IVF & IV ABX in progress, well tolerated. Dressing to left side of neck C/D/I. Will continue to monitor.

## 2019-02-06 NOTE — SBIRT NOTE. - NSSBIRTSERVICES_GEN_A_ED_FT
Provided SBIRT services: Full screen Negative. Positive reinforcement provided given patient currently within healthy guidelines. Education materials reviewed and given to patient.  Audit Score: 1  DAST Score: 0  Duration = 10 Minutes

## 2019-02-06 NOTE — PROGRESS NOTE ADULT - SUBJECTIVE AND OBJECTIVE BOX
The patient is a 63y old male who presents with a complaint of an infected left neck hematoma   with a non healing left carotid endarterectomy open wound.  He is scheduled to have an   EVACUATION OF LEFT NECK HEMATOMA, DEBRIDEMENT AND WASHOUT.  (05 Feb 2019 17:14)      PAST MEDICAL HISTORY:  Left Neck  Hematoma  Stented coronary artery  -  12/10/2018  Transient ischemic attack in November 2018 (He had no control over his left side & his right eye     stopped moving)    Carotid stenosis, left  Hyperlipidemia  Abdominal Aortic Aneurysm  Hypertension  Attention Deficit Disorder  CABG  x  4  on  6/2009  CAD (Coronary Artery Disease)      PAST SURGICAL HISTORY:  History of left-sided carotid endarterectomy  S/P AAA repair  S/P CABG (Coronary Artery Bypass Graft)  right hernia repair  S/P Tonsillectomy  HTN (Hypertension), Benign      MEDICATIONS  (STANDING):  clopidogrel Tablet 75 milliGRAM(s) Oral daily  docusate sodium 100 milliGRAM(s) Oral three times a day  lactated ringers Bolus 1000 milliLiter(s) IV Bolus once  lactated ringers. 1000 milliLiter(s) (100 mL/Hr) IV Continuous <Continuous>  multivitamin 1 Tablet(s) Oral daily  piperacillin/tazobactam IVPB. 3.375 Gram(s) IV Intermittent every 8 hours  simvastatin 20 milliGRAM(s) Oral at bedtime  sodium chloride 0.9% lock flush 3 milliLiter(s) IV Push every 8 hours  vancomycin  IVPB 1000 milliGRAM(s) IV Intermittent every 8 hours    MEDICATIONS  (PRN):  oxyCODONE    5 mG/acetaminophen 325 mG 1 Tablet(s) Oral every 4 hours PRN Moderate Pain (4 - 6)  senna 2 Tablet(s) Oral at bedtime PRN Constipation      Allergies:    No Known Drug Allergies      SOCIAL HISTORY:    He drinks alcohol - Frozen Margaretas occasionally.  He quit smoking 3 or 4 years ago                                after smoking 1 ppd x 32 years.  He never used illicit drugs.        Weight (kg): 69.1 (02-05-19 @ 14:29)                          10.9   9.5   )-----------( 280      ( 06 Feb 2019 02:58 )             33.0       PT/INR - ( 06 Feb 2019 02:58 )   PT: 14.5 sec;   INR: 1.25 ratio         PTT - ( 06 Feb 2019 02:58 )  PTT:37.8 sec    02-06    141  |  106  |  16.0  ----------------------------<  118<H>  4.0   |  23.0  |  1.02    Ca    9.9      06 Feb 2019 02:58  Phos  3.7     02-06  Mg     2.0     02-06    TPro  7.3  /  Alb  3.9  /  TBili  0.2<L>  /  DBili  x   /  AST  17  /  ALT  17  /  AlkPhos  84  02-05        EKG:    TT ECHO:    RADIOLOGY:    ASA # =             Mallampati # = The patient is a 63y old male who presents with a complaint of an infected left neck hematoma   with a non healing left carotid endarterectomy open wound.  He is scheduled to have an   EVACUATION OF LEFT NECK HEMATOMA, DEBRIDEMENT AND WASHOUT.  (05 Feb 2019 17:14)      PAST MEDICAL HISTORY:  Left Neck  Hematoma  Stented coronary artery  -  12/10/2018  Transient ischemic attack in November 2018 (He had no control over his left side & his right eye     stopped moving)    Carotid stenosis, left  Hyperlipidemia  Abdominal Aortic Aneurysm  Hypertension  Attention Deficit Disorder  CABG  x  4  on  6/2009  CAD (Coronary Artery Disease)      PAST SURGICAL HISTORY:  History of left-sided carotid endarterectomy  AAA repair  CABG (Coronary Artery Bypass Graft)  Right hernia repair  Tonsillectomy        MEDICATIONS  (STANDING):  clopidogrel Tablet 75 milliGRAM(s) Oral daily  docusate sodium 100 milliGRAM(s) Oral three times a day  lactated ringers Bolus 1000 milliLiter(s) IV Bolus once  lactated ringers. 1000 milliLiter(s) (100 mL/Hr) IV Continuous <Continuous>  multivitamin 1 Tablet(s) Oral daily  piperacillin/tazobactam IVPB. 3.375 Gram(s) IV Intermittent every 8 hours  simvastatin 20 milliGRAM(s) Oral at bedtime  sodium chloride 0.9% lock flush 3 milliLiter(s) IV Push every 8 hours  vancomycin  IVPB 1000 milliGRAM(s) IV Intermittent every 8 hours    MEDICATIONS  (PRN):  oxyCODONE    5 mG/acetaminophen 325 mG 1 Tablet(s) Oral every 4 hours PRN Moderate Pain (4 - 6)  senna 2 Tablet(s) Oral at bedtime PRN Constipation      Allergies:    No Known Drug Allergies      SOCIAL HISTORY:    He drinks alcohol - Frozen Margaretas occasionally.  He quit smoking 3 or 4 years ago                                after smoking 1 ppd x 32 years.  He never used illicit drugs.        Weight (kg): 69.1 (02-05-19 @ 14:29)                          10.9   9.5   )-----------( 280      ( 06 Feb 2019 02:58 )             33.0       PT/INR - ( 06 Feb 2019 02:58 )   PT: 14.5 sec;   INR: 1.25 ratio         PTT - ( 06 Feb 2019 02:58 )  PTT:37.8 sec    02-06    141  |  106  |  16.0  ----------------------------<  118<H>  4.0   |  23.0  |  1.02    Ca    9.9      06 Feb 2019 02:58  Phos  3.7     02-06  Mg     2.0     02-06    TPro  7.3  /  Alb  3.9  /  TBili  0.2<L>  /  DBili  x   /  AST  17  /  ALT  17  /  AlkPhos  84  02-05    EKG:  -  2/6/2019  Normal sinus rhythm  anterior wall MI  , age undetermined  Abnormal ECG    TT ECHO:  -  None    CATH LAB  -  12/10/2018  CORONARY VESSELS: The coronary circulation is right dominant.  LM:   --  LM: Normal.  LAD:   -- Mid LAD: There was a 100 % stenosis.  --  D1: There was a 95 % stenosis.  CX:   --  Proximal circumflex: There was a 40 % stenosis.  --  OM1: There was a 100 % stenosis. Distal vessel angiography showed a  small to medium sized vessel. Fills via SVG.  RCA:   --  RCA: There was a 100 % stenosis.  GRAFTS:   --  Graft to the LAD: The graft was a LIMA. Graft angiography  showed no evidence of disease.  --  Graft to the 1st obtuse marginal: The graft was a saphenous vein graft.  Graft angiography showed moderate atherosclerosis.  --  Graft to the 2nd obtuse marginal: The graft was a sequential vein  graft. Graft angiography showed moderate atherosclerosis.  --  Graft to the RPDA: The graft was a saphenous vein graft.     ASA # = 4  Mallampati # = 4

## 2019-02-07 LAB
ANION GAP SERPL CALC-SCNC: 9 MMOL/L — SIGNIFICANT CHANGE UP (ref 5–17)
BASOPHILS # BLD AUTO: 0 K/UL — SIGNIFICANT CHANGE UP (ref 0–0.2)
BASOPHILS NFR BLD AUTO: 0.1 % — SIGNIFICANT CHANGE UP (ref 0–2)
BUN SERPL-MCNC: 9 MG/DL — SIGNIFICANT CHANGE UP (ref 8–20)
CALCIUM SERPL-MCNC: 9.3 MG/DL — SIGNIFICANT CHANGE UP (ref 8.6–10.2)
CHLORIDE SERPL-SCNC: 107 MMOL/L — SIGNIFICANT CHANGE UP (ref 98–107)
CO2 SERPL-SCNC: 22 MMOL/L — SIGNIFICANT CHANGE UP (ref 22–29)
CREAT SERPL-MCNC: 0.9 MG/DL — SIGNIFICANT CHANGE UP (ref 0.5–1.3)
EOSINOPHIL # BLD AUTO: 0 K/UL — SIGNIFICANT CHANGE UP (ref 0–0.5)
EOSINOPHIL NFR BLD AUTO: 0.1 % — SIGNIFICANT CHANGE UP (ref 0–5)
GLUCOSE SERPL-MCNC: 286 MG/DL — HIGH (ref 70–115)
GRAM STN FLD: SIGNIFICANT CHANGE UP
HCT VFR BLD CALC: 35.3 % — LOW (ref 42–52)
HCV AB S/CO SERPL IA: 0.33 S/CO — SIGNIFICANT CHANGE UP
HCV AB SERPL-IMP: SIGNIFICANT CHANGE UP
HGB BLD-MCNC: 11.5 G/DL — LOW (ref 14–18)
LYMPHOCYTES # BLD AUTO: 1.1 K/UL — SIGNIFICANT CHANGE UP (ref 1–4.8)
LYMPHOCYTES # BLD AUTO: 10.7 % — LOW (ref 20–55)
MAGNESIUM SERPL-MCNC: 1.8 MG/DL — SIGNIFICANT CHANGE UP (ref 1.6–2.6)
MCHC RBC-ENTMCNC: 29.1 PG — SIGNIFICANT CHANGE UP (ref 27–31)
MCHC RBC-ENTMCNC: 32.6 G/DL — SIGNIFICANT CHANGE UP (ref 32–36)
MCV RBC AUTO: 89.4 FL — SIGNIFICANT CHANGE UP (ref 80–94)
MONOCYTES # BLD AUTO: 0.2 K/UL — SIGNIFICANT CHANGE UP (ref 0–0.8)
MONOCYTES NFR BLD AUTO: 2 % — LOW (ref 3–10)
NEUTROPHILS # BLD AUTO: 9.1 K/UL — HIGH (ref 1.8–8)
NEUTROPHILS NFR BLD AUTO: 86.6 % — HIGH (ref 37–73)
PHOSPHATE SERPL-MCNC: 2.2 MG/DL — LOW (ref 2.4–4.7)
PLATELET # BLD AUTO: 277 K/UL — SIGNIFICANT CHANGE UP (ref 150–400)
POTASSIUM SERPL-MCNC: 4.1 MMOL/L — SIGNIFICANT CHANGE UP (ref 3.5–5.3)
POTASSIUM SERPL-SCNC: 4.1 MMOL/L — SIGNIFICANT CHANGE UP (ref 3.5–5.3)
RBC # BLD: 3.95 M/UL — LOW (ref 4.6–6.2)
RBC # FLD: 13.3 % — SIGNIFICANT CHANGE UP (ref 11–15.6)
SODIUM SERPL-SCNC: 138 MMOL/L — SIGNIFICANT CHANGE UP (ref 135–145)
SPECIMEN SOURCE: SIGNIFICANT CHANGE UP
VANCOMYCIN TROUGH SERPL-MCNC: 8.3 UG/ML — LOW (ref 10–20)
WBC # BLD: 10.5 K/UL — SIGNIFICANT CHANGE UP (ref 4.8–10.8)
WBC # FLD AUTO: 10.5 K/UL — SIGNIFICANT CHANGE UP (ref 4.8–10.8)

## 2019-02-07 RX ORDER — VANCOMYCIN HCL 1 G
1000 VIAL (EA) INTRAVENOUS ONCE
Qty: 0 | Refills: 0 | Status: COMPLETED | OUTPATIENT
Start: 2019-02-07 | End: 2019-02-07

## 2019-02-07 RX ORDER — CLOPIDOGREL BISULFATE 75 MG/1
75 TABLET, FILM COATED ORAL DAILY
Qty: 0 | Refills: 0 | Status: DISCONTINUED | OUTPATIENT
Start: 2019-02-07 | End: 2019-02-11

## 2019-02-07 RX ORDER — OXYCODONE HYDROCHLORIDE 5 MG/1
10 TABLET ORAL EVERY 4 HOURS
Qty: 0 | Refills: 0 | Status: DISCONTINUED | OUTPATIENT
Start: 2019-02-07 | End: 2019-02-11

## 2019-02-07 RX ORDER — VANCOMYCIN HCL 1 G
1000 VIAL (EA) INTRAVENOUS EVERY 8 HOURS
Qty: 0 | Refills: 0 | Status: DISCONTINUED | OUTPATIENT
Start: 2019-02-08 | End: 2019-02-09

## 2019-02-07 RX ORDER — VANCOMYCIN HCL 1 G
VIAL (EA) INTRAVENOUS
Qty: 0 | Refills: 0 | Status: DISCONTINUED | OUTPATIENT
Start: 2019-02-07 | End: 2019-02-09

## 2019-02-07 RX ADMIN — PIPERACILLIN AND TAZOBACTAM 25 GRAM(S): 4; .5 INJECTION, POWDER, LYOPHILIZED, FOR SOLUTION INTRAVENOUS at 13:55

## 2019-02-07 RX ADMIN — Medication 25 MILLIGRAM(S): at 21:07

## 2019-02-07 RX ADMIN — Medication 100 MILLIGRAM(S): at 21:07

## 2019-02-07 RX ADMIN — Medication 100 MILLIGRAM(S): at 05:40

## 2019-02-07 RX ADMIN — Medication 81 MILLIGRAM(S): at 11:08

## 2019-02-07 RX ADMIN — PIPERACILLIN AND TAZOBACTAM 25 GRAM(S): 4; .5 INJECTION, POWDER, LYOPHILIZED, FOR SOLUTION INTRAVENOUS at 21:07

## 2019-02-07 RX ADMIN — Medication 250 MILLIGRAM(S): at 04:08

## 2019-02-07 RX ADMIN — Medication 250 MILLIGRAM(S): at 17:43

## 2019-02-07 RX ADMIN — OXYCODONE HYDROCHLORIDE 10 MILLIGRAM(S): 5 TABLET ORAL at 02:18

## 2019-02-07 RX ADMIN — OXYCODONE HYDROCHLORIDE 5 MILLIGRAM(S): 5 TABLET ORAL at 15:01

## 2019-02-07 RX ADMIN — Medication 100 MILLIGRAM(S): at 13:55

## 2019-02-07 RX ADMIN — Medication 250 MILLIGRAM(S): at 21:05

## 2019-02-07 RX ADMIN — PIPERACILLIN AND TAZOBACTAM 25 GRAM(S): 4; .5 INJECTION, POWDER, LYOPHILIZED, FOR SOLUTION INTRAVENOUS at 05:40

## 2019-02-07 RX ADMIN — CLOPIDOGREL BISULFATE 75 MILLIGRAM(S): 75 TABLET, FILM COATED ORAL at 11:08

## 2019-02-07 RX ADMIN — OXYCODONE HYDROCHLORIDE 5 MILLIGRAM(S): 5 TABLET ORAL at 14:01

## 2019-02-07 RX ADMIN — Medication 1 TABLET(S): at 11:08

## 2019-02-07 RX ADMIN — OXYCODONE HYDROCHLORIDE 10 MILLIGRAM(S): 5 TABLET ORAL at 03:01

## 2019-02-07 RX ADMIN — OXYCODONE HYDROCHLORIDE 5 MILLIGRAM(S): 5 TABLET ORAL at 08:50

## 2019-02-07 RX ADMIN — OXYCODONE HYDROCHLORIDE 5 MILLIGRAM(S): 5 TABLET ORAL at 09:50

## 2019-02-07 RX ADMIN — ROSUVASTATIN CALCIUM 10 MILLIGRAM(S): 5 TABLET ORAL at 21:07

## 2019-02-07 NOTE — PHYSICAL EXAM
[JVD] : no jugular venous distention  [Normal Heart Sounds] : normal heart sounds [Normal Rate and Rhythm] : normal rate and rhythm [2+] : left 2+ [Ankle Swelling (On Exam)] : not present [Abdomen Masses] : No abdominal masses [Alert] : alert [Oriented to Person] : oriented to person [Oriented to Place] : oriented to place [Calm] : calm [de-identified] : AAO x 3, NAD [de-identified] : small sized, soft left neck hematoma. 5mm area of dehiscence on superior aspect of wound, significant surrounding erythema

## 2019-02-07 NOTE — PHARMACOTHERAPY INTERVENTION NOTE - COMMENTS
Vancomycin trough ordered for 15:00 today, 1 hour prior to the scheduled dose.  Will adjust dose to maintain a trough of 15-20

## 2019-02-07 NOTE — HISTORY OF PRESENT ILLNESS
[FreeTextEntry1] : 63 year old male s/p left carotid endarterectomy 4 days ago.\par He is on DAPT for recent PCI with YULIA.\par He developed a moderate sized left neck hematoma with oozing from his incision.\par He denies dysphagia, neck pain, dyspnea or noisy breathing.\par  [de-identified] : C/o increased drainage from incision

## 2019-02-07 NOTE — PROGRESS NOTE ADULT - ASSESSMENT
64 y/o male POD0 s/p debridement and washout of infected left neck hematoma after CEA in January. Procedure was completed without complication & pt transferred to PACU in stable condition. No invasion of carotid artery noted intra-op. Pt required PO and IV beta blocker in PACU for elevated BP which is now improved. Also required straight catheterization for high post-void residual volume. Currently awaiting transfer to [facility].  - Continue to monitor BP - responding well with current treatment, will give PRN meds if continues to be elevated  - Continue Vanco & Zosyn  - F/u wound cx results  - Pain control PRN  - Bladder scans 	q6 hours - may require insertion of indwelling catheter if pt continues to retain  - Regular diet  - Ambulation as tolerated  - ASA 81mg QD  - SCDs b/l for DVT ppx  - Incentive spirometer for pulm toileting

## 2019-02-07 NOTE — ASSESSMENT
[FreeTextEntry1] : 63 year old male s/p left carotid endarterectomy 4 weeks ago\par He is on DAPT for recent PCI with YULIA.\par He developed a moderate sized left neck hematoma with oozing from his incision.\par Hematoma is resolving but now has increasing erythema concerning for infection.\par Fluctuant area of wound opened and drained in office, foul smelling old blood drained.\par Will admit for CTA and OR for washout

## 2019-02-07 NOTE — PROGRESS NOTE ADULT - SUBJECTIVE AND OBJECTIVE BOX
POST-OP CHECK: Patient seen & examined at bedside in PACU. He is s/p debridement and washout of infected neck hematoma. Pt tolerated procedure well. He states pain around incision site is moderate but well controlled with current medication regimen. States he has tolerated diet, both liquid and solid, without difficulty swallowing. Denies feelings of neck swelling or difficulty breathing. Rcvd PO metoprolol (home dose verified with patient's pharmacy) as well as 1 push IV metoprolol for elevated BP in PACU. BP now well controlled,  at time of my exam. Required straight catheterization x 1 for post-void residual of ~700cc. Patient states he has gone into urinary retention post-operatively in the past. He currently denies numbness / tingling / weakness of b/l upper extremities, difficulty swallowing, difficulty breathing, CP, SOB, fever, or chills.     MEDICATIONS  (STANDING):  aspirin  chewable 81 milliGRAM(s) Oral daily  docusate sodium 100 milliGRAM(s) Oral three times a day  metoprolol succinate ER 25 milliGRAM(s) Oral every 24 hours  multivitamin 1 Tablet(s) Oral daily  piperacillin/tazobactam IVPB. 3.375 Gram(s) IV Intermittent every 8 hours  rosuvastatin 10 milliGRAM(s) Oral at bedtime  vancomycin  IVPB 1000 milliGRAM(s) IV Intermittent every 12 hours    MEDICATIONS  (PRN):  acetaminophen   Tablet .. 650 milliGRAM(s) Oral every 6 hours PRN Mild Pain (1 - 3)  metoprolol tartrate Injectable 5 milliGRAM(s) IV Push every 6 hours PRN Systolic>160  oxyCODONE    IR 5 milliGRAM(s) Oral every 4 hours PRN Moderate Pain (4 - 6)  oxyCODONE    IR 10 milliGRAM(s) Oral every 4 hours PRN Severe Pain (7 - 10)  senna 2 Tablet(s) Oral at bedtime PRN Constipation      Vital Signs Last 24 Hrs  T(C): 36.8 (06 Feb 2019 19:52), Max: 37 (06 Feb 2019 15:13)  T(F): 98.2 (06 Feb 2019 19:52), Max: 98.6 (06 Feb 2019 15:13)  HR: 62 (06 Feb 2019 23:00) (55 - 74)  BP: 143/68 (06 Feb 2019 23:00) (108/62 - 151/76)  BP(mean): 85 (06 Feb 2019 23:00) (85 - 85)  RR: 18 (06 Feb 2019 23:00) (15 - 18)  SpO2: 98% (06 Feb 2019 23:00) (98% - 100%)    Constitutional: patient resting comfortably in bed, in no acute distress  HEENT: EOMI / PERRL b/l  Neck: left neck dressing with dried sanguenous drainage. No hematoma or active drainage noted under dressing. Penrose drain in place.   Respiratory: respirations are unlabored, no accessory muscle use, no conversational dyspnea  Cardiovascular: regular rate & rhythm  Neurological: A&O x 3; no focal deficits  Psychiatric: Normal mood, normal affect  Musculoskeletal: No joint pain, swelling or deformity; no limitation of movement      I&O's Detail    06 Feb 2019 07:01  -  07 Feb 2019 00:22  --------------------------------------------------------  IN:    IV PiggyBack: 25 mL    lactated ringers.: 300 mL    Oral Fluid: 240 mL  Total IN: 565 mL    OUT:    Intermittent Catheterization - Urethral: 725 mL    Voided: 125 mL  Total OUT: 850 mL    Total NET: -285 mL          LABS:                        10.9   9.5   )-----------( 280      ( 06 Feb 2019 02:58 )             33.0     02-06    141  |  106  |  16.0  ----------------------------<  118<H>  4.0   |  23.0  |  1.02    Ca    9.9      06 Feb 2019 02:58  Phos  3.7     02-06  Mg     2.0     02-06    TPro  7.3  /  Alb  3.9  /  TBili  0.2<L>  /  DBili  x   /  AST  17  /  ALT  17  /  AlkPhos  84  02-05    PT/INR - ( 06 Feb 2019 02:58 )   PT: 14.5 sec;   INR: 1.25 ratio         PTT - ( 06 Feb 2019 02:58 )  PTT:37.8 sec

## 2019-02-07 NOTE — PROGRESS NOTE ADULT - SUBJECTIVE AND OBJECTIVE BOX
Patient is a 63y old  Male who presents with a chief complaint of Infected L neck hematoma (07 Feb 2019 00:22)    Pt is S/P nonexcisional debridement and washout of infected neck hematoma         POD#  1  Pt without c/o pain, difficulty swallowing, speech disturbance or tongue swelling  Afebrile overnight, voiding freely after garces removed, tolerating diet     Vital Signs Last 24 Hrs  T(C): 36.7 (07 Feb 2019 08:46), Max: 37.1 (07 Feb 2019 00:32)  T(F): 98 (07 Feb 2019 08:46), Max: 98.7 (07 Feb 2019 00:32)  HR: 81 (07 Feb 2019 08:00) (60 - 82)  BP: 101/43 (07 Feb 2019 04:00) (101/43 - 155/90)  BP(mean): 57 (07 Feb 2019 04:00) (57 - 105)  RR: 15 (07 Feb 2019 04:00) (12 - 18)  SpO2: 96% (07 Feb 2019 04:00) (96% - 100%)  I&O's Detail    06 Feb 2019 07:01  -  07 Feb 2019 07:00  --------------------------------------------------------  IN:    lactated ringers.: 300 mL    Oral Fluid: 600 mL    Solution: 325 mL    Solution: 100 mL  Total IN: 1325 mL    OUT:    Intermittent Catheterization - Urethral: 725 mL    Voided: 450 mL  Total OUT: 1175 mL    Total NET: 150 mL      07 Feb 2019 07:01  -  07 Feb 2019 09:35  --------------------------------------------------------  IN:    Oral Fluid: 200 mL  Total IN: 200 mL    OUT:    Voided: 400 mL  Total OUT: 400 mL    Total NET: -200 mL    MEDICATIONS  (STANDING):  aspirin  chewable 81 milliGRAM(s) Oral daily  docusate sodium 100 milliGRAM(s) Oral three times a day  metoprolol succinate ER 25 milliGRAM(s) Oral every 24 hours  multivitamin 1 Tablet(s) Oral daily  piperacillin/tazobactam IVPB. 3.375 Gram(s) IV Intermittent every 8 hours  rosuvastatin 10 milliGRAM(s) Oral at bedtime  vancomycin  IVPB 1000 milliGRAM(s) IV Intermittent every 12 hours    MEDICATIONS  (PRN):  acetaminophen   Tablet .. 650 milliGRAM(s) Oral every 6 hours PRN Mild Pain (1 - 3)  metoprolol tartrate Injectable 5 milliGRAM(s) IV Push every 6 hours PRN Systolic>160  oxyCODONE    IR 5 milliGRAM(s) Oral every 4 hours PRN Moderate Pain (4 - 6)  oxyCODONE    IR 10 milliGRAM(s) Oral every 4 hours PRN Severe Pain (7 - 10)  senna 2 Tablet(s) Oral at bedtime PRN Constipation    PAST MEDICAL & SURGICAL HISTORY:  Hematoma  TIA (transient ischemic attack): 11/2018  Stented coronary artery: 12/10/2018   TIA (transient ischemic attack): 11/2018  Carotid stenosis, left  Hyperlipidemia  AAA (Abdominal Aortic Aneurysm): Dx. 5/2009- attempted repair  12/2009 surgery aborted due to hypotension.r  HTN (Hypertension), Benign  ADD (Attention Deficit Disorder)  CABG (Coronary Artery Bypass Graft)x4 6/09  CAD (Coronary Artery Disease)  History of left-sided carotid endarterectomy  S/P AAA repair: 2010  S/P CABG (Coronary Artery Bypass Graft): 5/2009 x 4 vessels  right hernia repair  S/P Tonsillectomy    Physical Exam:  General: NAD, resting comfortably in bed. L neck dressing saturated with serosang fluid. Incision with sutures intact and penrose drains draining serosang fluid. Periwound edema and skin mascerated.   Neuro: A&O x 3. Speech fluent. BARBOSA x 4 =  Pulmonary: Nonlabored breathing, CTA  Cardiovascular: Normal S1, S2  Abdominal: soft, NT/ND  Extremities: WWP      LABS:                        11.5   10.5  )-----------( 277      ( 07 Feb 2019 05:28 )             35.3     02-07    138  |  107  |  9.0  ----------------------------<  286<H>  4.1   |  22.0  |  0.90    Ca    9.3      07 Feb 2019 05:28  Phos  2.2     02-07  Mg     1.8     02-07    TPro  7.3  /  Alb  3.9  /  TBili  0.2<L>  /  DBili  x   /  AST  17  /  ALT  17  /  AlkPhos  84  02-05    PT/INR - ( 06 Feb 2019 02:58 )   PT: 14.5 sec;   INR: 1.25 ratio         PTT - ( 06 Feb 2019 02:58 )  PTT:37.8 sec  CAPILLARY BLOOD GLUCOSE          Radiology and Additional Studies:    Assessment:63yMaleHPI:  62 y/o M with recent L CEA by Dr Burciaga on 1/11/19 presents with bleeding from L neck after seeing Dr Burciaga in vascular office this am. Pt had post op complications of L neck hematoma and bleeding for which he was followed closely as an outpt by Dr Burciaga. Seen in office today and noted with increased swelling and erythema and had incision opened by Dr Burciaga in which old clot was expressed. Pt presented to ED for admission for initiation of IV antibiotics and OR scheduled for evacuation of hematoma and washout L neck. (05 Feb 2019 13:48)  S/P nonexcisional debridement and washout of infected neck hematoma         POD#  1    Plan:  Cont ASA and resume Plavix  Cont Statin  F/U intraop cx  Cont abx  OOB/Ambulate/PT  DM management  Can downgrade to floor bed

## 2019-02-08 DIAGNOSIS — T14.8XXA OTHER INJURY OF UNSPECIFIED BODY REGION, INITIAL ENCOUNTER: ICD-10-CM

## 2019-02-08 LAB
ANION GAP SERPL CALC-SCNC: 10 MMOL/L — SIGNIFICANT CHANGE UP (ref 5–17)
BUN SERPL-MCNC: 10 MG/DL — SIGNIFICANT CHANGE UP (ref 8–20)
CALCIUM SERPL-MCNC: 9.8 MG/DL — SIGNIFICANT CHANGE UP (ref 8.6–10.2)
CHLORIDE SERPL-SCNC: 111 MMOL/L — HIGH (ref 98–107)
CO2 SERPL-SCNC: 22 MMOL/L — SIGNIFICANT CHANGE UP (ref 22–29)
CREAT SERPL-MCNC: 0.72 MG/DL — SIGNIFICANT CHANGE UP (ref 0.5–1.3)
GLUCOSE SERPL-MCNC: 117 MG/DL — HIGH (ref 70–115)
HCT VFR BLD CALC: 32.4 % — LOW (ref 42–52)
HGB BLD-MCNC: 10.4 G/DL — LOW (ref 14–18)
MCHC RBC-ENTMCNC: 28.6 PG — SIGNIFICANT CHANGE UP (ref 27–31)
MCHC RBC-ENTMCNC: 32.1 G/DL — SIGNIFICANT CHANGE UP (ref 32–36)
MCV RBC AUTO: 89 FL — SIGNIFICANT CHANGE UP (ref 80–94)
PLATELET # BLD AUTO: 299 K/UL — SIGNIFICANT CHANGE UP (ref 150–400)
POTASSIUM SERPL-MCNC: 4 MMOL/L — SIGNIFICANT CHANGE UP (ref 3.5–5.3)
POTASSIUM SERPL-SCNC: 4 MMOL/L — SIGNIFICANT CHANGE UP (ref 3.5–5.3)
RBC # BLD: 3.64 M/UL — LOW (ref 4.6–6.2)
RBC # FLD: 13.6 % — SIGNIFICANT CHANGE UP (ref 11–15.6)
SODIUM SERPL-SCNC: 143 MMOL/L — SIGNIFICANT CHANGE UP (ref 135–145)
VANCOMYCIN TROUGH SERPL-MCNC: 19.1 UG/ML — SIGNIFICANT CHANGE UP (ref 10–20)
WBC # BLD: 10.4 K/UL — SIGNIFICANT CHANGE UP (ref 4.8–10.8)
WBC # FLD AUTO: 10.4 K/UL — SIGNIFICANT CHANGE UP (ref 4.8–10.8)

## 2019-02-08 RX ADMIN — Medication 250 MILLIGRAM(S): at 06:40

## 2019-02-08 RX ADMIN — OXYCODONE HYDROCHLORIDE 10 MILLIGRAM(S): 5 TABLET ORAL at 23:20

## 2019-02-08 RX ADMIN — Medication 250 MILLIGRAM(S): at 14:00

## 2019-02-08 RX ADMIN — OXYCODONE HYDROCHLORIDE 10 MILLIGRAM(S): 5 TABLET ORAL at 02:12

## 2019-02-08 RX ADMIN — OXYCODONE HYDROCHLORIDE 5 MILLIGRAM(S): 5 TABLET ORAL at 18:32

## 2019-02-08 RX ADMIN — PIPERACILLIN AND TAZOBACTAM 25 GRAM(S): 4; .5 INJECTION, POWDER, LYOPHILIZED, FOR SOLUTION INTRAVENOUS at 05:21

## 2019-02-08 RX ADMIN — OXYCODONE HYDROCHLORIDE 5 MILLIGRAM(S): 5 TABLET ORAL at 20:19

## 2019-02-08 RX ADMIN — Medication 25 MILLIGRAM(S): at 20:22

## 2019-02-08 RX ADMIN — OXYCODONE HYDROCHLORIDE 10 MILLIGRAM(S): 5 TABLET ORAL at 03:00

## 2019-02-08 RX ADMIN — Medication 100 MILLIGRAM(S): at 23:20

## 2019-02-08 RX ADMIN — Medication 81 MILLIGRAM(S): at 11:24

## 2019-02-08 RX ADMIN — ROSUVASTATIN CALCIUM 10 MILLIGRAM(S): 5 TABLET ORAL at 23:20

## 2019-02-08 RX ADMIN — Medication 250 MILLIGRAM(S): at 23:19

## 2019-02-08 RX ADMIN — Medication 100 MILLIGRAM(S): at 05:20

## 2019-02-08 RX ADMIN — CLOPIDOGREL BISULFATE 75 MILLIGRAM(S): 75 TABLET, FILM COATED ORAL at 11:24

## 2019-02-08 RX ADMIN — PIPERACILLIN AND TAZOBACTAM 25 GRAM(S): 4; .5 INJECTION, POWDER, LYOPHILIZED, FOR SOLUTION INTRAVENOUS at 17:47

## 2019-02-08 RX ADMIN — Medication 1 TABLET(S): at 11:24

## 2019-02-08 NOTE — PROGRESS NOTE ADULT - SUBJECTIVE AND OBJECTIVE BOX
No acute events overnight. s/p left neck exploration, wound debridement and washout POD 2 doing well. Pain is improved. No difficulties eating, breathing or range of motion.     ROS: denies fevers, chills, nausea, vomiting, blurry vision, chest pain, shortness of breath.       MEDICATIONS  (STANDING):  aspirin  chewable 81 milliGRAM(s) Oral daily  clopidogrel Tablet 75 milliGRAM(s) Oral daily  docusate sodium 100 milliGRAM(s) Oral three times a day  metoprolol succinate ER 25 milliGRAM(s) Oral every 24 hours  multivitamin 1 Tablet(s) Oral daily  piperacillin/tazobactam IVPB. 3.375 Gram(s) IV Intermittent every 8 hours  rosuvastatin 10 milliGRAM(s) Oral at bedtime  vancomycin  IVPB 1000 milliGRAM(s) IV Intermittent every 8 hours  vancomycin  IVPB        MEDICATIONS  (PRN):  acetaminophen   Tablet .. 650 milliGRAM(s) Oral every 6 hours PRN Mild Pain (1 - 3)  oxyCODONE    IR 5 milliGRAM(s) Oral every 4 hours PRN Moderate Pain (4 - 6)  oxyCODONE    IR 10 milliGRAM(s) Oral every 4 hours PRN Severe Pain (7 - 10)  senna 2 Tablet(s) Oral at bedtime PRN Constipation      Vital Signs Last 24 Hrs  T(C): 36.6 (08 Feb 2019 08:22), Max: 37 (07 Feb 2019 16:52)  T(F): 97.8 (08 Feb 2019 08:22), Max: 98.6 (07 Feb 2019 16:52)  HR: 48 (08 Feb 2019 04:00) (48 - 65)  BP: 114/54 (08 Feb 2019 04:00) (114/54 - 135/58)  BP(mean): 68 (08 Feb 2019 04:00) (67 - 75)  RR: 18 (08 Feb 2019 04:00) (18 - 24)  SpO2: 98% (08 Feb 2019 04:00) (97% - 99%)    Physical Exam:    General: no acute distress, AOx3  HEENT: normocephalic, atraumatic, EOMI, left neck wound without tenderness. minimal erythema. no fluctuance or purulent drainage. serosanguinous drainage from penrose drain.   Respiratory: Breath Sounds equal & clear to auscultation, no accessory muscle use  Cardiovascular: Regular rate & rhythm, normal S1, S2; no murmurs, gallops or rubs  Gastrointestinal: Soft, non-tender, nondistended, normal bowel sounds    Extremities: No peripheral edema, No cyanosis, clubbing     Vascular: Equal and normal pulses: 2+ peripheral pulses throughout          I&O's Detail    07 Feb 2019 07:01  -  08 Feb 2019 07:00  --------------------------------------------------------  IN:    Oral Fluid: 440 mL    Solution: 150 mL    Solution: 250 mL  Total IN: 840 mL    OUT:    Voided: 1400 mL  Total OUT: 1400 mL    Total NET: -560 mL          LABS:                        10.4   10.4  )-----------( 299      ( 08 Feb 2019 05:19 )             32.4     02-08    143  |  111<H>  |  10.0  ----------------------------<  117<H>  4.0   |  22.0  |  0.72    Ca    9.8      08 Feb 2019 05:19  Phos  2.2     02-07  Mg     1.8     02-07            RADIOLOGY & ADDITIONAL STUDIES:

## 2019-02-08 NOTE — PROGRESS NOTE ADULT - SUBJECTIVE AND OBJECTIVE BOX
Pt seen, chart reviewed.  S/p Irrigation, Washout, and Debridement of Left Neck Abcess and Infected Hematoma, POD#2.  VSS.  Adequate pain control.  Resting comfortably.   Tolerating PO intake.  No N/V.    No anesthesia problems noted.

## 2019-02-09 LAB
-  AMPICILLIN/SULBACTAM: SIGNIFICANT CHANGE UP
-  CEFAZOLIN: SIGNIFICANT CHANGE UP
-  CLINDAMYCIN: SIGNIFICANT CHANGE UP
-  ERYTHROMYCIN: SIGNIFICANT CHANGE UP
-  GENTAMICIN: SIGNIFICANT CHANGE UP
-  OXACILLIN: SIGNIFICANT CHANGE UP
-  PENICILLIN: SIGNIFICANT CHANGE UP
-  RIFAMPIN: SIGNIFICANT CHANGE UP
-  TETRACYCLINE: SIGNIFICANT CHANGE UP
-  TRIMETHOPRIM/SULFAMETHOXAZOLE: SIGNIFICANT CHANGE UP
-  VANCOMYCIN: SIGNIFICANT CHANGE UP
ANION GAP SERPL CALC-SCNC: 12 MMOL/L — SIGNIFICANT CHANGE UP (ref 5–17)
BASOPHILS # BLD AUTO: 0 K/UL — SIGNIFICANT CHANGE UP (ref 0–0.2)
BASOPHILS NFR BLD AUTO: 0.3 % — SIGNIFICANT CHANGE UP (ref 0–2)
BUN SERPL-MCNC: 10 MG/DL — SIGNIFICANT CHANGE UP (ref 8–20)
CALCIUM SERPL-MCNC: 9.7 MG/DL — SIGNIFICANT CHANGE UP (ref 8.6–10.2)
CHLORIDE SERPL-SCNC: 109 MMOL/L — HIGH (ref 98–107)
CO2 SERPL-SCNC: 22 MMOL/L — SIGNIFICANT CHANGE UP (ref 22–29)
CREAT SERPL-MCNC: 0.74 MG/DL — SIGNIFICANT CHANGE UP (ref 0.5–1.3)
EOSINOPHIL # BLD AUTO: 0.4 K/UL — SIGNIFICANT CHANGE UP (ref 0–0.5)
EOSINOPHIL NFR BLD AUTO: 4.3 % — SIGNIFICANT CHANGE UP (ref 0–5)
GLUCOSE SERPL-MCNC: 96 MG/DL — SIGNIFICANT CHANGE UP (ref 70–115)
HCT VFR BLD CALC: 34.5 % — LOW (ref 42–52)
HGB BLD-MCNC: 11.5 G/DL — LOW (ref 14–18)
LYMPHOCYTES # BLD AUTO: 2.1 K/UL — SIGNIFICANT CHANGE UP (ref 1–4.8)
LYMPHOCYTES # BLD AUTO: 22.1 % — SIGNIFICANT CHANGE UP (ref 20–55)
MAGNESIUM SERPL-MCNC: 2.1 MG/DL — SIGNIFICANT CHANGE UP (ref 1.6–2.6)
MCHC RBC-ENTMCNC: 29.6 PG — SIGNIFICANT CHANGE UP (ref 27–31)
MCHC RBC-ENTMCNC: 33.3 G/DL — SIGNIFICANT CHANGE UP (ref 32–36)
MCV RBC AUTO: 88.9 FL — SIGNIFICANT CHANGE UP (ref 80–94)
METHOD TYPE: SIGNIFICANT CHANGE UP
MONOCYTES # BLD AUTO: 0.9 K/UL — HIGH (ref 0–0.8)
MONOCYTES NFR BLD AUTO: 9.5 % — SIGNIFICANT CHANGE UP (ref 3–10)
NEUTROPHILS # BLD AUTO: 6 K/UL — SIGNIFICANT CHANGE UP (ref 1.8–8)
NEUTROPHILS NFR BLD AUTO: 63.3 % — SIGNIFICANT CHANGE UP (ref 37–73)
PHOSPHATE SERPL-MCNC: 3.5 MG/DL — SIGNIFICANT CHANGE UP (ref 2.4–4.7)
PLATELET # BLD AUTO: 308 K/UL — SIGNIFICANT CHANGE UP (ref 150–400)
POTASSIUM SERPL-MCNC: 4 MMOL/L — SIGNIFICANT CHANGE UP (ref 3.5–5.3)
POTASSIUM SERPL-SCNC: 4 MMOL/L — SIGNIFICANT CHANGE UP (ref 3.5–5.3)
RBC # BLD: 3.88 M/UL — LOW (ref 4.6–6.2)
RBC # FLD: 13.9 % — SIGNIFICANT CHANGE UP (ref 11–15.6)
SODIUM SERPL-SCNC: 143 MMOL/L — SIGNIFICANT CHANGE UP (ref 135–145)
VANCOMYCIN TROUGH SERPL-MCNC: 20 UG/ML — SIGNIFICANT CHANGE UP (ref 10–20)
WBC # BLD: 9.6 K/UL — SIGNIFICANT CHANGE UP (ref 4.8–10.8)
WBC # FLD AUTO: 9.6 K/UL — SIGNIFICANT CHANGE UP (ref 4.8–10.8)

## 2019-02-09 RX ORDER — CEFAZOLIN SODIUM 1 G
2000 VIAL (EA) INJECTION ONCE
Qty: 0 | Refills: 0 | Status: COMPLETED | OUTPATIENT
Start: 2019-02-09 | End: 2019-02-09

## 2019-02-09 RX ORDER — CEFAZOLIN SODIUM 1 G
2000 VIAL (EA) INJECTION EVERY 8 HOURS
Qty: 0 | Refills: 0 | Status: DISCONTINUED | OUTPATIENT
Start: 2019-02-09 | End: 2019-02-11

## 2019-02-09 RX ORDER — CEFAZOLIN SODIUM 1 G
VIAL (EA) INJECTION
Qty: 0 | Refills: 0 | Status: DISCONTINUED | OUTPATIENT
Start: 2019-02-09 | End: 2019-02-11

## 2019-02-09 RX ADMIN — Medication 100 MILLIGRAM(S): at 12:55

## 2019-02-09 RX ADMIN — OXYCODONE HYDROCHLORIDE 10 MILLIGRAM(S): 5 TABLET ORAL at 08:49

## 2019-02-09 RX ADMIN — PIPERACILLIN AND TAZOBACTAM 25 GRAM(S): 4; .5 INJECTION, POWDER, LYOPHILIZED, FOR SOLUTION INTRAVENOUS at 10:27

## 2019-02-09 RX ADMIN — Medication 1 TABLET(S): at 22:17

## 2019-02-09 RX ADMIN — CLOPIDOGREL BISULFATE 75 MILLIGRAM(S): 75 TABLET, FILM COATED ORAL at 10:29

## 2019-02-09 RX ADMIN — OXYCODONE HYDROCHLORIDE 10 MILLIGRAM(S): 5 TABLET ORAL at 00:42

## 2019-02-09 RX ADMIN — Medication 25 MILLIGRAM(S): at 22:15

## 2019-02-09 RX ADMIN — Medication 100 MILLIGRAM(S): at 13:02

## 2019-02-09 RX ADMIN — PIPERACILLIN AND TAZOBACTAM 25 GRAM(S): 4; .5 INJECTION, POWDER, LYOPHILIZED, FOR SOLUTION INTRAVENOUS at 02:02

## 2019-02-09 RX ADMIN — Medication 81 MILLIGRAM(S): at 10:29

## 2019-02-09 RX ADMIN — Medication 100 MILLIGRAM(S): at 22:15

## 2019-02-09 RX ADMIN — Medication 100 MILLIGRAM(S): at 06:21

## 2019-02-09 RX ADMIN — OXYCODONE HYDROCHLORIDE 10 MILLIGRAM(S): 5 TABLET ORAL at 22:25

## 2019-02-09 RX ADMIN — OXYCODONE HYDROCHLORIDE 10 MILLIGRAM(S): 5 TABLET ORAL at 10:41

## 2019-02-09 RX ADMIN — ROSUVASTATIN CALCIUM 10 MILLIGRAM(S): 5 TABLET ORAL at 22:15

## 2019-02-09 RX ADMIN — OXYCODONE HYDROCHLORIDE 10 MILLIGRAM(S): 5 TABLET ORAL at 22:42

## 2019-02-09 NOTE — PROGRESS NOTE ADULT - SUBJECTIVE AND OBJECTIVE BOX
HPI/OVERNIGHT EVENTS: Patient seen & examined at bedside. He reports feeling well this morning. States that pain around incision is well controlled with current medication regimen. Denies feelings of neck swelling, difficulty swallowing / phonating, difficulty breathing. Tolerating diet without difficulty. Voiding and passing flatus. Denies numbness / tingling of upper extremities. Denies fever, chills, CP, or SOB.     MEDICATIONS  (STANDING):  aspirin  chewable 81 milliGRAM(s) Oral daily  ceFAZolin   IVPB      ceFAZolin   IVPB 2000 milliGRAM(s) IV Intermittent once  ceFAZolin   IVPB 2000 milliGRAM(s) IV Intermittent every 8 hours  clopidogrel Tablet 75 milliGRAM(s) Oral daily  docusate sodium 100 milliGRAM(s) Oral three times a day  metoprolol succinate ER 25 milliGRAM(s) Oral every 24 hours  multivitamin 1 Tablet(s) Oral daily  rosuvastatin 10 milliGRAM(s) Oral at bedtime    MEDICATIONS  (PRN):  acetaminophen   Tablet .. 650 milliGRAM(s) Oral every 6 hours PRN Mild Pain (1 - 3)  oxyCODONE    IR 5 milliGRAM(s) Oral every 4 hours PRN Moderate Pain (4 - 6)  oxyCODONE    IR 10 milliGRAM(s) Oral every 4 hours PRN Severe Pain (7 - 10)  senna 2 Tablet(s) Oral at bedtime PRN Constipation      Vital Signs Last 24 Hrs  T(C): 36.5 (09 Feb 2019 06:19), Max: 36.9 (09 Feb 2019 00:00)  T(F): 97.7 (09 Feb 2019 06:19), Max: 98.5 (09 Feb 2019 00:00)  HR: 57 (09 Feb 2019 06:19) (56 - 80)  BP: 114/60 (09 Feb 2019 06:19) (114/60 - 164/63)  BP(mean): 100 (08 Feb 2019 18:00) (86 - 100)  RR: 18 (09 Feb 2019 06:19) (14 - 19)  SpO2: 97% (09 Feb 2019 06:19) (95% - 100%)    Constitutional: patient resting comfortably in bed, in no acute distress  HEENT: EOMI / PERRL b/l, no active drainage or redness  Neck: Left neck incision C/D/I. Penrose in place, superior aspect trimmed by Dr. Burciaga. No purulent or foul smelling drainage. No bleeding, hematoma, or surrounding erythema noted. 4x4 placed over incision.  Respiratory: respirations are unlabored, no accessory muscle use, no conversational dyspnea  Cardiovascular: regular rate & rhythm  Gastrointestinal: Abdomen soft, non-tender, non-distended, no rebound tenderness / guarding  Neurological: A&O x 3; no gross sensory / motor / coordination deficits      I&O's Detail    08 Feb 2019 07:01  -  09 Feb 2019 07:00  --------------------------------------------------------  IN:    Solution: 250 mL    Solution: 125 mL  Total IN: 375 mL    OUT:    Voided: 2250 mL  Total OUT: 2250 mL    Total NET: -1875 mL          LABS:                        11.5   9.6   )-----------( 308      ( 09 Feb 2019 06:21 )             34.5     02-09    143  |  109<H>  |  10.0  ----------------------------<  96  4.0   |  22.0  |  0.74    Ca    9.7      09 Feb 2019 06:21  Phos  3.5     02-09  Mg     2.1     02-09 HPI/OVERNIGHT EVENTS: Patient seen & examined at bedside. He reports feeling well this morning. States that pain around incision is well controlled with current medication regimen. Denies feelings of neck swelling, difficulty swallowing / phonating, difficulty breathing. Tolerating diet without difficulty. Voiding and passing flatus. Denies numbness / tingling of upper extremities. Denies fever, chills, CP, or SOB.     MEDICATIONS  (STANDING):  aspirin  chewable 81 milliGRAM(s) Oral daily  ceFAZolin   IVPB      ceFAZolin   IVPB 2000 milliGRAM(s) IV Intermittent once  ceFAZolin   IVPB 2000 milliGRAM(s) IV Intermittent every 8 hours  clopidogrel Tablet 75 milliGRAM(s) Oral daily  docusate sodium 100 milliGRAM(s) Oral three times a day  metoprolol succinate ER 25 milliGRAM(s) Oral every 24 hours  multivitamin 1 Tablet(s) Oral daily  rosuvastatin 10 milliGRAM(s) Oral at bedtime    MEDICATIONS  (PRN):  acetaminophen   Tablet .. 650 milliGRAM(s) Oral every 6 hours PRN Mild Pain (1 - 3)  oxyCODONE    IR 5 milliGRAM(s) Oral every 4 hours PRN Moderate Pain (4 - 6)  oxyCODONE    IR 10 milliGRAM(s) Oral every 4 hours PRN Severe Pain (7 - 10)  senna 2 Tablet(s) Oral at bedtime PRN Constipation      Vital Signs Last 24 Hrs  T(C): 36.5 (09 Feb 2019 06:19), Max: 36.9 (09 Feb 2019 00:00)  T(F): 97.7 (09 Feb 2019 06:19), Max: 98.5 (09 Feb 2019 00:00)  HR: 57 (09 Feb 2019 06:19) (56 - 80)  BP: 114/60 (09 Feb 2019 06:19) (114/60 - 164/63)  BP(mean): 100 (08 Feb 2019 18:00) (86 - 100)  RR: 18 (09 Feb 2019 06:19) (14 - 19)  SpO2: 97% (09 Feb 2019 06:19) (95% - 100%)    Constitutional: patient resting comfortably in bed, in no acute distress, phonating well  HEENT: EOMI / PERRL b/l, no active drainage or redness  Neck: Left neck incision C/D/I, no significant edema noted. Penrose in place, superior aspect trimmed by Dr. Burciaga. No purulent or foul smelling drainage. No bleeding, hematoma, or surrounding erythema noted. 4x4 placed over incision.  Respiratory: respirations are unlabored, no accessory muscle use, no conversational dyspnea  Cardiovascular: regular rate & rhythm  Gastrointestinal: Abdomen soft, non-tender, non-distended, no rebound tenderness / guarding  Neurological: A&O x 3; no gross sensory / motor / coordination deficits      I&O's Detail    08 Feb 2019 07:01  -  09 Feb 2019 07:00  --------------------------------------------------------  IN:    Solution: 250 mL    Solution: 125 mL  Total IN: 375 mL    OUT:    Voided: 2250 mL  Total OUT: 2250 mL    Total NET: -1875 mL          LABS:                        11.5   9.6   )-----------( 308      ( 09 Feb 2019 06:21 )             34.5     02-09    143  |  109<H>  |  10.0  ----------------------------<  96  4.0   |  22.0  |  0.74    Ca    9.7      09 Feb 2019 06:21  Phos  3.5     02-09  Mg     2.1     02-09

## 2019-02-09 NOTE — PROGRESS NOTE ADULT - ASSESSMENT
64 y/o male POD#3 s/p left neck exploration, wound debridement and washout for infected hematoma s/p CEA  - Local wound care to neck incision  - Wound cx = staph, pan sensitive --> d/c'ed vanco & zosyn and started Ancef  - Continue current pain control regimen, PRN  - Regular diet  - Continue ASA / plavix  - Encourage ambulation as tolerated  - DVT ppx with b/l SCDs  - Incentive spirometer for pulm toileting

## 2019-02-10 ENCOUNTER — TRANSCRIPTION ENCOUNTER (OUTPATIENT)
Age: 64
End: 2019-02-10

## 2019-02-10 LAB
ANION GAP SERPL CALC-SCNC: 9 MMOL/L — SIGNIFICANT CHANGE UP (ref 5–17)
BASOPHILS # BLD AUTO: 0 K/UL — SIGNIFICANT CHANGE UP (ref 0–0.2)
BASOPHILS NFR BLD AUTO: 0.3 % — SIGNIFICANT CHANGE UP (ref 0–2)
BUN SERPL-MCNC: 11 MG/DL — SIGNIFICANT CHANGE UP (ref 8–20)
CALCIUM SERPL-MCNC: 9.9 MG/DL — SIGNIFICANT CHANGE UP (ref 8.6–10.2)
CHLORIDE SERPL-SCNC: 108 MMOL/L — HIGH (ref 98–107)
CO2 SERPL-SCNC: 24 MMOL/L — SIGNIFICANT CHANGE UP (ref 22–29)
CREAT SERPL-MCNC: 0.79 MG/DL — SIGNIFICANT CHANGE UP (ref 0.5–1.3)
EOSINOPHIL # BLD AUTO: 0.4 K/UL — SIGNIFICANT CHANGE UP (ref 0–0.5)
EOSINOPHIL NFR BLD AUTO: 3.5 % — SIGNIFICANT CHANGE UP (ref 0–5)
GLUCOSE SERPL-MCNC: 90 MG/DL — SIGNIFICANT CHANGE UP (ref 70–115)
HCT VFR BLD CALC: 36.2 % — LOW (ref 42–52)
HGB BLD-MCNC: 11.6 G/DL — LOW (ref 14–18)
LYMPHOCYTES # BLD AUTO: 2.4 K/UL — SIGNIFICANT CHANGE UP (ref 1–4.8)
LYMPHOCYTES # BLD AUTO: 21.7 % — SIGNIFICANT CHANGE UP (ref 20–55)
MAGNESIUM SERPL-MCNC: 2 MG/DL — SIGNIFICANT CHANGE UP (ref 1.6–2.6)
MCHC RBC-ENTMCNC: 28.6 PG — SIGNIFICANT CHANGE UP (ref 27–31)
MCHC RBC-ENTMCNC: 32 G/DL — SIGNIFICANT CHANGE UP (ref 32–36)
MCV RBC AUTO: 89.2 FL — SIGNIFICANT CHANGE UP (ref 80–94)
MONOCYTES # BLD AUTO: 1 K/UL — HIGH (ref 0–0.8)
MONOCYTES NFR BLD AUTO: 8.8 % — SIGNIFICANT CHANGE UP (ref 3–10)
NEUTROPHILS # BLD AUTO: 7.1 K/UL — SIGNIFICANT CHANGE UP (ref 1.8–8)
NEUTROPHILS NFR BLD AUTO: 65.2 % — SIGNIFICANT CHANGE UP (ref 37–73)
PHOSPHATE SERPL-MCNC: 2.9 MG/DL — SIGNIFICANT CHANGE UP (ref 2.4–4.7)
PLATELET # BLD AUTO: 293 K/UL — SIGNIFICANT CHANGE UP (ref 150–400)
POTASSIUM SERPL-MCNC: 4.2 MMOL/L — SIGNIFICANT CHANGE UP (ref 3.5–5.3)
POTASSIUM SERPL-SCNC: 4.2 MMOL/L — SIGNIFICANT CHANGE UP (ref 3.5–5.3)
RBC # BLD: 4.06 M/UL — LOW (ref 4.6–6.2)
RBC # FLD: 13.9 % — SIGNIFICANT CHANGE UP (ref 11–15.6)
SODIUM SERPL-SCNC: 141 MMOL/L — SIGNIFICANT CHANGE UP (ref 135–145)
SURGICAL PATHOLOGY STUDY: SIGNIFICANT CHANGE UP
WBC # BLD: 10.8 K/UL — SIGNIFICANT CHANGE UP (ref 4.8–10.8)
WBC # FLD AUTO: 10.8 K/UL — SIGNIFICANT CHANGE UP (ref 4.8–10.8)

## 2019-02-10 RX ORDER — SENNA PLUS 8.6 MG/1
2 TABLET ORAL
Qty: 30 | Refills: 0 | OUTPATIENT
Start: 2019-02-10 | End: 2019-02-24

## 2019-02-10 RX ORDER — SODIUM,POTASSIUM PHOSPHATES 278-250MG
1 POWDER IN PACKET (EA) ORAL ONCE
Qty: 0 | Refills: 0 | Status: COMPLETED | OUTPATIENT
Start: 2019-02-10 | End: 2019-02-10

## 2019-02-10 RX ORDER — OXYCODONE HYDROCHLORIDE 5 MG/1
1 TABLET ORAL
Qty: 30 | Refills: 0
Start: 2019-02-10 | End: 2019-03-25

## 2019-02-10 RX ORDER — OXYCODONE HYDROCHLORIDE 5 MG/1
1 TABLET ORAL
Qty: 30 | Refills: 0 | OUTPATIENT
Start: 2019-02-10 | End: 2019-02-14

## 2019-02-10 RX ADMIN — Medication 1 TABLET(S): at 11:25

## 2019-02-10 RX ADMIN — Medication 100 MILLIGRAM(S): at 21:56

## 2019-02-10 RX ADMIN — Medication 100 MILLIGRAM(S): at 05:46

## 2019-02-10 RX ADMIN — CLOPIDOGREL BISULFATE 75 MILLIGRAM(S): 75 TABLET, FILM COATED ORAL at 11:19

## 2019-02-10 RX ADMIN — OXYCODONE HYDROCHLORIDE 10 MILLIGRAM(S): 5 TABLET ORAL at 22:23

## 2019-02-10 RX ADMIN — Medication 81 MILLIGRAM(S): at 11:19

## 2019-02-10 RX ADMIN — Medication 100 MILLIGRAM(S): at 13:57

## 2019-02-10 RX ADMIN — ROSUVASTATIN CALCIUM 10 MILLIGRAM(S): 5 TABLET ORAL at 21:56

## 2019-02-10 RX ADMIN — OXYCODONE HYDROCHLORIDE 10 MILLIGRAM(S): 5 TABLET ORAL at 13:55

## 2019-02-10 RX ADMIN — Medication 25 MILLIGRAM(S): at 17:57

## 2019-02-10 RX ADMIN — Medication 1 PACKET(S): at 11:25

## 2019-02-10 RX ADMIN — OXYCODONE HYDROCHLORIDE 10 MILLIGRAM(S): 5 TABLET ORAL at 22:04

## 2019-02-10 NOTE — DISCHARGE NOTE ADULT - PLAN OF CARE
Resolve active infection of left neck Surgical debridement and antibiotic treatment May shower daily. Remove dressing prior. Wash incision with soap and water and pat dry. Replace dressing daily. No ointments, powders or creams to incision. Do not shave over incision x 3 weeks or until healed. Monitor incision for any redness, swelling or drainage and call office immediately with any concerns. Follow up with Dr Burciaga in office in 1 week. Office will call with appointment. Office number [phone]

## 2019-02-10 NOTE — DISCHARGE NOTE ADULT - CARE PROVIDER_API CALL
Jonh Burciaga)  Surgery  [address] [place] [address]  Phone: [phone]  Fax: [phone]  Follow Up Time:

## 2019-02-10 NOTE — PROGRESS NOTE ADULT - ASSESSMENT
64yo M s/p left neck exploration for infected CEA who is improving.     - daily dressing changes  - monitor BP and Cr.  - continue abx  - ASA and Plavix

## 2019-02-10 NOTE — DISCHARGE NOTE ADULT - ADDITIONAL INSTRUCTIONS
Patient may shower, but not bath with wound.   Patient is to follow-up with Dr. Burcaiga in 2 weeks.

## 2019-02-10 NOTE — DISCHARGE NOTE ADULT - INSTRUCTIONS
May shower wound site, but not soak or bath it. Do not scrub directly into the wound. May place a dry gauze and tape for dressing and change at least every 24 hours.

## 2019-02-10 NOTE — DISCHARGE NOTE ADULT - NS AS ACTIVITY OBS
Showering allowed/No Heavy lifting/straining/Walking-Indoors allowed/Stairs allowed/Do not drive or operate machinery/Walking-Outdoors allowed

## 2019-02-10 NOTE — DISCHARGE NOTE ADULT - MEDICATION SUMMARY - MEDICATIONS TO TAKE
I will START or STAY ON the medications listed below when I get home from the hospital:    aspirin 81 mg oral delayed release tablet  -- 1 tab(s) by mouth once a day  -- Indication: For History of left-sided carotid endarterectomy    oxyCODONE 10 mg oral tablet  -- 1 tab(s) by mouth every 4 hours, As needed, Severe Pain (7 - 10) MDD:6  -- Indication: For pain control    rosuvastatin 10 mg oral tablet  -- 1 tab(s) by mouth once a day (at bedtime)  -- Indication: For History of left-sided carotid endarterectomy    clopidogrel 75 mg oral tablet  -- 1 tab(s) by mouth once a day  -- Indication: For History of left-sided carotid endarterectomy    Metoprolol Succinate ER 25 mg oral tablet, extended release  -- 1 tab(s) by mouth once a day (at bedtime)  -- Indication: For Hypertension    senna oral tablet  -- 2 tab(s) by mouth once a day (at bedtime), As needed, Constipation  -- Indication: For constipation    Colace 100 mg oral capsule  -- Indication: For constipation I will START or STAY ON the medications listed below when I get home from the hospital:    aspirin 81 mg oral delayed release tablet  -- 1 tab(s) by mouth once a day  -- Indication: For History of left-sided carotid endarterectomy    oxyCODONE 10 mg oral tablet  -- 1 tab(s) by mouth every 4 hours, As needed, Severe Pain (7 - 10) MDD:6  -- Indication: For pain control    rosuvastatin 10 mg oral tablet  -- 1 tab(s) by mouth once a day (at bedtime)  -- Indication: For History of left-sided carotid endarterectomy    clopidogrel 75 mg oral tablet  -- 1 tab(s) by mouth once a day  -- Indication: For History of left-sided carotid endarterectomy    Metoprolol Succinate ER 25 mg oral tablet, extended release  -- 1 tab(s) by mouth once a day (at bedtime)  -- Indication: For Hypertension    senna oral tablet  -- 2 tab(s) by mouth once a day (at bedtime), As needed, Constipation  -- Indication: For constipation    Colace 100 mg oral capsule  -- Indication: For constipation    Bactrim  mg-160 mg oral tablet  -- 1 tab(s) by mouth 2 times a day   -- Avoid prolonged or excessive exposure to direct and/or artificial sunlight while taking this medication.  Finish all this medication unless otherwise directed by prescriber.  Medication should be taken with plenty of water.    -- Indication: For Infection following a procedure, other surgical site, initial encounter

## 2019-02-10 NOTE — PROGRESS NOTE ADULT - SUBJECTIVE AND OBJECTIVE BOX
HPI/OVERNIGHT EVENTS: 64yo M examined at bedside and found in no distress. No overnight events. Patient is tolerating diet. Has no active complaints. Denies fever, chills, nausea, vomiting, chest pain, and SOB.    Vital Signs Last 24 Hrs  T(C): 36.4 (10 Feb 2019 05:44), Max: 36.8 (09 Feb 2019 12:22)  T(F): 97.6 (10 Feb 2019 05:44), Max: 98.3 (09 Feb 2019 12:22)  HR: 60 (10 Feb 2019 05:44) (60 - 68)  BP: 109/65 (10 Feb 2019 05:44) (105/75 - 122/56)  BP(mean): --  RR: 18 (10 Feb 2019 05:44) (18 - 18)  SpO2: 100% (10 Feb 2019 05:44) (98% - 100%)    I&O's Detail    09 Feb 2019 07:01  -  10 Feb 2019 07:00  --------------------------------------------------------  IN:    Oral Fluid: 960 mL    Solution: 50 mL    Solution: 200 mL  Total IN: 1210 mL    OUT:  Total OUT: 0 mL    Total NET: 1210 mL          Constitutional: patient resting comfortably in bed, in no acute distress  HEENT: EOMI / PERRL    Neck: No JVD, full ROM without pain. L CEA procedure site with penrose with minimal drainage and improving surrounding skin erythema.   Respiratory: CTAB, respirations are unlabored, no accessory muscle use, no conversational dyspnea  Cardiovascular: regular rate & rhythm   Gastrointestinal: Abdomen soft, non-tender, non-distended, no rebound tenderness / guarding    LABS:                        11.6   10.8  )-----------( 293      ( 10 Feb 2019 06:39 )             36.2     02-10    141  |  108<H>  |  11.0  ----------------------------<  90  4.2   |  24.0  |  0.79    Ca    9.9      10 Feb 2019 06:39  Phos  2.9     02-10  Mg     2.0     02-10            MEDICATIONS  (STANDING):  aspirin  chewable 81 milliGRAM(s) Oral daily  ceFAZolin   IVPB      ceFAZolin   IVPB 2000 milliGRAM(s) IV Intermittent every 8 hours  clopidogrel Tablet 75 milliGRAM(s) Oral daily  docusate sodium 100 milliGRAM(s) Oral three times a day  metoprolol succinate ER 25 milliGRAM(s) Oral every 24 hours  multivitamin 1 Tablet(s) Oral daily  rosuvastatin 10 milliGRAM(s) Oral at bedtime    MEDICATIONS  (PRN):  acetaminophen   Tablet .. 650 milliGRAM(s) Oral every 6 hours PRN Mild Pain (1 - 3)  oxyCODONE    IR 5 milliGRAM(s) Oral every 4 hours PRN Moderate Pain (4 - 6)  oxyCODONE    IR 10 milliGRAM(s) Oral every 4 hours PRN Severe Pain (7 - 10)  senna 2 Tablet(s) Oral at bedtime PRN Constipation

## 2019-02-10 NOTE — DISCHARGE NOTE ADULT - CARE PLAN
Principal Discharge DX:	Surgical site infection  Goal:	Resolve active infection of left neck  Assessment and plan of treatment:	Surgical debridement and antibiotic treatment Principal Discharge DX:	Surgical site infection  Goal:	Resolve active infection of left neck  Assessment and plan of treatment:	May shower daily. Remove dressing prior. Wash incision with soap and water and pat dry. Replace dressing daily. No ointments, powders or creams to incision. Do not shave over incision x 3 weeks or until healed. Monitor incision for any redness, swelling or drainage and call office immediately with any concerns. Follow up with Dr Burciaga in office in 1 week. Office will call with appointment. Office number [phone]

## 2019-02-10 NOTE — DISCHARGE NOTE ADULT - MEDICATION SUMMARY - MEDICATIONS TO STOP TAKING
I will STOP taking the medications listed below when I get home from the hospital:    oxycodone-acetaminophen 5 mg-325 mg oral tablet  -- 1 tab(s) by mouth every 6 hours, As Needed -for moderate pain MDD:4  -- Caution federal law prohibits the transfer of this drug to any person other  than the person for whom it was prescribed.  May cause drowsiness.  Alcohol may intensify this effect.  Use care when operating dangerous machinery.  This prescription cannot be refilled.  This product contains acetaminophen.  Do not use  with any other product containing acetaminophen to prevent possible liver damage.  Using more of this medication than prescribed may cause serious breathing problems.    oxycodone-acetaminophen 5 mg-325 mg oral tablet  -- 1 tab(s) by mouth every 6 hours, As Needed -for moderate pain MDD:4  -- Caution federal law prohibits the transfer of this drug to any person other  than the person for whom it was prescribed.  May cause drowsiness.  Alcohol may intensify this effect.  Use care when operating dangerous machinery.  This prescription cannot be refilled.  This product contains acetaminophen.  Do not use  with any other product containing acetaminophen to prevent possible liver damage.  Using more of this medication than prescribed may cause serious breathing problems.

## 2019-02-10 NOTE — DISCHARGE NOTE ADULT - HOSPITAL COURSE
H&P on admission:   64 y/o M with recent L CEA by Dr Burciaga on 1/11/19 presents with bleeding from L neck after seeing Dr Burciaga in vascular office this am. Pt had post op complications of L neck hematoma and bleeding for which he was followed closely as an outpt by Dr Burciaga. Seen in office today and noted with increased swelling and erythema and had incision opened by Dr Burciaga in which old clot was expressed. Pt presented to ED for admission for initiation of IV antibiotics and OR scheduled for evacuation of hematoma and washout L neck.    Patient was taken to OR for left neck exploration, wound debridement, washout and penrose drain placement. Patient tolerated procedure well, had an uncomplicated post-op period. Patient is being discharged in a hemodynamically stable condition. Will follow-up with Dr. Burciaga in 2 weeks.

## 2019-02-10 NOTE — DISCHARGE NOTE ADULT - PATIENT PORTAL LINK FT
You can access the [facility] Patient Portal, offered by [facility], by registering with the following website: [email or web address]

## 2019-02-11 VITALS
SYSTOLIC BLOOD PRESSURE: 126 MMHG | HEART RATE: 978 BPM | RESPIRATION RATE: 18 BRPM | DIASTOLIC BLOOD PRESSURE: 82 MMHG | TEMPERATURE: 98 F

## 2019-02-11 PROBLEM — T14.8XXA OTHER INJURY OF UNSPECIFIED BODY REGION, INITIAL ENCOUNTER: Chronic | Status: ACTIVE | Noted: 2019-02-05

## 2019-02-11 LAB
ANION GAP SERPL CALC-SCNC: 11 MMOL/L — SIGNIFICANT CHANGE UP (ref 5–17)
BASOPHILS # BLD AUTO: 0 K/UL — SIGNIFICANT CHANGE UP (ref 0–0.2)
BASOPHILS NFR BLD AUTO: 0.3 % — SIGNIFICANT CHANGE UP (ref 0–2)
BUN SERPL-MCNC: 12 MG/DL — SIGNIFICANT CHANGE UP (ref 8–20)
CALCIUM SERPL-MCNC: 9.9 MG/DL — SIGNIFICANT CHANGE UP (ref 8.6–10.2)
CHLORIDE SERPL-SCNC: 107 MMOL/L — SIGNIFICANT CHANGE UP (ref 98–107)
CO2 SERPL-SCNC: 25 MMOL/L — SIGNIFICANT CHANGE UP (ref 22–29)
CREAT SERPL-MCNC: 0.86 MG/DL — SIGNIFICANT CHANGE UP (ref 0.5–1.3)
EOSINOPHIL # BLD AUTO: 0.4 K/UL — SIGNIFICANT CHANGE UP (ref 0–0.5)
EOSINOPHIL NFR BLD AUTO: 3.5 % — SIGNIFICANT CHANGE UP (ref 0–6)
GLUCOSE SERPL-MCNC: 105 MG/DL — SIGNIFICANT CHANGE UP (ref 70–115)
HCT VFR BLD CALC: 36.7 % — LOW (ref 42–52)
HGB BLD-MCNC: 11.7 G/DL — LOW (ref 14–18)
LYMPHOCYTES # BLD AUTO: 18.9 % — LOW (ref 20–55)
LYMPHOCYTES # BLD AUTO: 2.1 K/UL — SIGNIFICANT CHANGE UP (ref 1–4.8)
MAGNESIUM SERPL-MCNC: 2.1 MG/DL — SIGNIFICANT CHANGE UP (ref 1.8–2.6)
MCHC RBC-ENTMCNC: 28.3 PG — SIGNIFICANT CHANGE UP (ref 27–31)
MCHC RBC-ENTMCNC: 31.9 G/DL — LOW (ref 32–36)
MCV RBC AUTO: 88.6 FL — SIGNIFICANT CHANGE UP (ref 80–94)
MONOCYTES # BLD AUTO: 0.9 K/UL — HIGH (ref 0–0.8)
MONOCYTES NFR BLD AUTO: 7.7 % — SIGNIFICANT CHANGE UP (ref 3–10)
NEUTROPHILS # BLD AUTO: 7.8 K/UL — SIGNIFICANT CHANGE UP (ref 1.8–8)
NEUTROPHILS NFR BLD AUTO: 69.3 % — SIGNIFICANT CHANGE UP (ref 37–73)
PHOSPHATE SERPL-MCNC: 2.9 MG/DL — SIGNIFICANT CHANGE UP (ref 2.4–4.7)
PLATELET # BLD AUTO: 319 K/UL — SIGNIFICANT CHANGE UP (ref 150–400)
POTASSIUM SERPL-MCNC: 4.4 MMOL/L — SIGNIFICANT CHANGE UP (ref 3.5–5.3)
POTASSIUM SERPL-SCNC: 4.4 MMOL/L — SIGNIFICANT CHANGE UP (ref 3.5–5.3)
RBC # BLD: 4.14 M/UL — LOW (ref 4.6–6.2)
RBC # FLD: 13.8 % — SIGNIFICANT CHANGE UP (ref 11–15.6)
SODIUM SERPL-SCNC: 143 MMOL/L — SIGNIFICANT CHANGE UP (ref 135–145)
WBC # BLD: 11.2 K/UL — HIGH (ref 4.8–10.8)
WBC # FLD AUTO: 11.2 K/UL — HIGH (ref 4.8–10.8)

## 2019-02-11 PROCEDURE — 71045 X-RAY EXAM CHEST 1 VIEW: CPT

## 2019-02-11 PROCEDURE — 84100 ASSAY OF PHOSPHORUS: CPT

## 2019-02-11 PROCEDURE — 87070 CULTURE OTHR SPECIMN AEROBIC: CPT

## 2019-02-11 PROCEDURE — 70498 CT ANGIOGRAPHY NECK: CPT

## 2019-02-11 PROCEDURE — 85027 COMPLETE CBC AUTOMATED: CPT

## 2019-02-11 PROCEDURE — 36415 COLL VENOUS BLD VENIPUNCTURE: CPT

## 2019-02-11 PROCEDURE — 87075 CULTR BACTERIA EXCEPT BLOOD: CPT

## 2019-02-11 PROCEDURE — 87186 SC STD MICRODIL/AGAR DIL: CPT

## 2019-02-11 PROCEDURE — 86900 BLOOD TYPING SEROLOGIC ABO: CPT

## 2019-02-11 PROCEDURE — 85610 PROTHROMBIN TIME: CPT

## 2019-02-11 PROCEDURE — 93005 ELECTROCARDIOGRAM TRACING: CPT

## 2019-02-11 PROCEDURE — 86901 BLOOD TYPING SEROLOGIC RH(D): CPT

## 2019-02-11 PROCEDURE — C1889: CPT

## 2019-02-11 PROCEDURE — 83735 ASSAY OF MAGNESIUM: CPT

## 2019-02-11 PROCEDURE — 84484 ASSAY OF TROPONIN QUANT: CPT

## 2019-02-11 PROCEDURE — 85730 THROMBOPLASTIN TIME PARTIAL: CPT

## 2019-02-11 PROCEDURE — 99285 EMERGENCY DEPT VISIT HI MDM: CPT

## 2019-02-11 PROCEDURE — 80053 COMPREHEN METABOLIC PANEL: CPT

## 2019-02-11 PROCEDURE — 86803 HEPATITIS C AB TEST: CPT

## 2019-02-11 PROCEDURE — 87493 C DIFF AMPLIFIED PROBE: CPT

## 2019-02-11 PROCEDURE — 80048 BASIC METABOLIC PNL TOTAL CA: CPT

## 2019-02-11 PROCEDURE — 80202 ASSAY OF VANCOMYCIN: CPT

## 2019-02-11 PROCEDURE — 86850 RBC ANTIBODY SCREEN: CPT

## 2019-02-11 PROCEDURE — 88304 TISSUE EXAM BY PATHOLOGIST: CPT

## 2019-02-11 RX ORDER — AZTREONAM 2 G
1 VIAL (EA) INJECTION
Qty: 28 | Refills: 0 | OUTPATIENT
Start: 2019-02-11 | End: 2019-02-24

## 2019-02-11 RX ADMIN — CLOPIDOGREL BISULFATE 75 MILLIGRAM(S): 75 TABLET, FILM COATED ORAL at 13:22

## 2019-02-11 RX ADMIN — Medication 100 MILLIGRAM(S): at 13:40

## 2019-02-11 RX ADMIN — Medication 100 MILLIGRAM(S): at 05:05

## 2019-02-11 RX ADMIN — Medication 81 MILLIGRAM(S): at 13:22

## 2019-02-11 RX ADMIN — Medication 1 TABLET(S): at 13:22

## 2019-02-11 RX ADMIN — Medication 100 MILLIGRAM(S): at 05:06

## 2019-02-11 NOTE — PROGRESS NOTE ADULT - ASSESSMENT
64yo M s/p left neck exploration for infected CEA who is improving. Penrose removed yesterday.    - daily dressing changes  - monitor BP and Cr.  - continue abx  - ASA and Plavix 62yo M s/p left neck exploration for infected CEA who is improving. Penrose removed yesterday.    - daily dressing changes  - monitor BP and Cr.  - continue IV Ancef for MSSA; consider po abx upo dc  - Cont ASA and Plavix  - Dispo planning next 24 hours 62yo M s/p left neck exploration for infected CEA who is improving. Penrose removed yesterday.    - daily dressing changes  - monitor BP and Cr.  - continue IV Ancef for MSSA; po abx upon dc  - Cont ASA and Plavix  - Dispo planning: home today

## 2019-02-11 NOTE — PROGRESS NOTE ADULT - SUBJECTIVE AND OBJECTIVE BOX
HPI/OVERNIGHT EVENTS: Patient seen and examined at bedside this AM. No acute events overnight per nursing reports. Patient reports pain is well controlled on current regimen. Tolerating diet, full bowel function. Denies fever, chills, nausea, vomitting, chest pain, SOB, dizziness, abd pain or any other concerning symptoms    Vital Signs Last 24 Hrs  T(C): 36.8 (11 Feb 2019 05:00), Max: 36.8 (10 Feb 2019 15:13)  T(F): 98.2 (11 Feb 2019 05:00), Max: 98.3 (10 Feb 2019 15:13)  HR: 56 (11 Feb 2019 05:00) (56 - 63)  BP: 106/59 (11 Feb 2019 05:00) (106/58 - 140/70)  BP(mean): --  RR: 16 (11 Feb 2019 05:00) (16 - 18)  SpO2: 100% (11 Feb 2019 05:00) (95% - 100%)    I&O's Detail    09 Feb 2019 07:01  -  10 Feb 2019 07:00  --------------------------------------------------------  IN:    Oral Fluid: 960 mL    Solution: 50 mL    Solution: 200 mL  Total IN: 1210 mL    OUT:  Total OUT: 0 mL    Total NET: 1210 mL      10 Feb 2019 07:01  -  11 Feb 2019 05:47  --------------------------------------------------------  IN:    Oral Fluid: 480 mL  Total IN: 480 mL    OUT:  Total OUT: 0 mL    Total NET: 480 mL          Constitutional: patient resting comfortably in bed, in no acute distress  HEENT: EOMI / PERRL    Neck: No JVD, full ROM without pain. L CEA procedure site with minimal drainage and improving surrounding skin erythema.   Respiratory: CTAB, respirations are unlabored, no accessory muscle use, no conversational dyspnea  Cardiovascular: regular rate & rhythm   Gastrointestinal: Abdomen soft, non-tender, non-distended, no rebound tenderness / guarding    LABS:                        11.6   10.8  )-----------( 293      ( 10 Feb 2019 06:39 )             36.2     02-10    141  |  108<H>  |  11.0  ----------------------------<  90  4.2   |  24.0  |  0.79    Ca    9.9      10 Feb 2019 06:39  Phos  2.9     02-10  Mg     2.0     02-10            MEDICATIONS  (STANDING):  aspirin  chewable 81 milliGRAM(s) Oral daily  ceFAZolin   IVPB      ceFAZolin   IVPB 2000 milliGRAM(s) IV Intermittent every 8 hours  clopidogrel Tablet 75 milliGRAM(s) Oral daily  docusate sodium 100 milliGRAM(s) Oral three times a day  metoprolol succinate ER 25 milliGRAM(s) Oral every 24 hours  multivitamin 1 Tablet(s) Oral daily  rosuvastatin 10 milliGRAM(s) Oral at bedtime    MEDICATIONS  (PRN):  acetaminophen   Tablet .. 650 milliGRAM(s) Oral every 6 hours PRN Mild Pain (1 - 3)  oxyCODONE    IR 5 milliGRAM(s) Oral every 4 hours PRN Moderate Pain (4 - 6)  oxyCODONE    IR 10 milliGRAM(s) Oral every 4 hours PRN Severe Pain (7 - 10)  senna 2 Tablet(s) Oral at bedtime PRN Constipation      MICRO:   Cultures     STUDIES:   EKG, CXR, U/S, CT, MRI

## 2019-02-11 NOTE — PROGRESS NOTE ADULT - REASON FOR ADMISSION
Infected L neck hematoma

## 2019-02-12 LAB
CULTURE RESULTS: SIGNIFICANT CHANGE UP
ORGANISM # SPEC MICROSCOPIC CNT: SIGNIFICANT CHANGE UP
ORGANISM # SPEC MICROSCOPIC CNT: SIGNIFICANT CHANGE UP
SPECIMEN SOURCE: SIGNIFICANT CHANGE UP

## 2019-02-20 ENCOUNTER — APPOINTMENT (OUTPATIENT)
Dept: VASCULAR SURGERY | Facility: CLINIC | Age: 64
End: 2019-02-20
Payer: COMMERCIAL

## 2019-02-20 VITALS
DIASTOLIC BLOOD PRESSURE: 75 MMHG | OXYGEN SATURATION: 98 % | BODY MASS INDEX: 23.54 KG/M2 | HEART RATE: 69 BPM | WEIGHT: 150 LBS | TEMPERATURE: 98.2 F | SYSTOLIC BLOOD PRESSURE: 154 MMHG | HEIGHT: 67 IN

## 2019-02-20 PROCEDURE — 99024 POSTOP FOLLOW-UP VISIT: CPT

## 2019-02-20 NOTE — ASSESSMENT
[FreeTextEntry1] : 63 year old male s/p left CEA for symptomatic stenosis last month. \par No s/p hematoma evacuation 2 weeks ago. Doing well\par Complete PO antibiotic course\par Return in 1 month

## 2019-02-20 NOTE — HISTORY OF PRESENT ILLNESS
[FreeTextEntry1] : 63 year old male s/p left carotid endarterectomy last month.\par He is on DAPT for recent PCI with YULIA.\par He developed a moderate sized left neck hematoma with oozing from his incision.\par He denies dysphagia, neck pain, dyspnea or noisy breathing.\par  [de-identified] : S/P hematoma evacuation and wash out.\par Without complaints toaday

## 2019-02-20 NOTE — PHYSICAL EXAM
[JVD] : no jugular venous distention  [Normal Heart Sounds] : normal heart sounds [Normal Rate and Rhythm] : normal rate and rhythm [2+] : left 2+ [Ankle Swelling (On Exam)] : not present [Abdomen Masses] : No abdominal masses [Alert] : alert [Oriented to Person] : oriented to person [Oriented to Place] : oriented to place [Calm] : calm [de-identified] : AAO x 3, NAD [de-identified] : Incision healing well. No erythema or drainage. Sutures removed

## 2019-03-04 ENCOUNTER — TRANSCRIPTION ENCOUNTER (OUTPATIENT)
Age: 64
End: 2019-03-04

## 2019-03-10 ENCOUNTER — EMERGENCY (EMERGENCY)
Facility: HOSPITAL | Age: 64
LOS: 0 days | Discharge: HOME | End: 2019-03-10
Attending: EMERGENCY MEDICINE | Admitting: EMERGENCY MEDICINE
Payer: COMMERCIAL

## 2019-03-10 VITALS
HEART RATE: 59 BPM | DIASTOLIC BLOOD PRESSURE: 83 MMHG | RESPIRATION RATE: 17 BRPM | TEMPERATURE: 98 F | OXYGEN SATURATION: 100 % | SYSTOLIC BLOOD PRESSURE: 159 MMHG

## 2019-03-10 VITALS — WEIGHT: 151.9 LBS | HEIGHT: 62 IN

## 2019-03-10 DIAGNOSIS — R22.1 LOCALIZED SWELLING, MASS AND LUMP, NECK: ICD-10-CM

## 2019-03-10 DIAGNOSIS — Z98.890 OTHER SPECIFIED POSTPROCEDURAL STATES: Chronic | ICD-10-CM

## 2019-03-10 DIAGNOSIS — Z95.0 PRESENCE OF CARDIAC PACEMAKER: ICD-10-CM

## 2019-03-10 DIAGNOSIS — I25.10 ATHEROSCLEROTIC HEART DISEASE OF NATIVE CORONARY ARTERY WITHOUT ANGINA PECTORIS: ICD-10-CM

## 2019-03-10 DIAGNOSIS — E78.5 HYPERLIPIDEMIA, UNSPECIFIED: ICD-10-CM

## 2019-03-10 DIAGNOSIS — I10 ESSENTIAL (PRIMARY) HYPERTENSION: ICD-10-CM

## 2019-03-10 LAB
ALBUMIN SERPL ELPH-MCNC: 3.9 G/DL — SIGNIFICANT CHANGE UP (ref 3.3–5)
ALP SERPL-CCNC: 106 U/L — SIGNIFICANT CHANGE UP (ref 40–120)
ALT FLD-CCNC: 27 U/L — SIGNIFICANT CHANGE UP (ref 12–78)
ANION GAP SERPL CALC-SCNC: 8 MMOL/L — SIGNIFICANT CHANGE UP (ref 5–17)
APTT BLD: 35.8 SEC — SIGNIFICANT CHANGE UP (ref 27.5–36.3)
AST SERPL-CCNC: 27 U/L — SIGNIFICANT CHANGE UP (ref 15–37)
BASOPHILS # BLD AUTO: 0.07 K/UL — SIGNIFICANT CHANGE UP (ref 0–0.2)
BASOPHILS NFR BLD AUTO: 0.8 % — SIGNIFICANT CHANGE UP (ref 0–2)
BILIRUB SERPL-MCNC: 0.3 MG/DL — SIGNIFICANT CHANGE UP (ref 0.2–1.2)
BLD GP AB SCN SERPL QL: SIGNIFICANT CHANGE UP
BUN SERPL-MCNC: 18 MG/DL — SIGNIFICANT CHANGE UP (ref 7–23)
CALCIUM SERPL-MCNC: 10.1 MG/DL — SIGNIFICANT CHANGE UP (ref 8.5–10.1)
CHLORIDE SERPL-SCNC: 111 MMOL/L — HIGH (ref 96–108)
CO2 SERPL-SCNC: 25 MMOL/L — SIGNIFICANT CHANGE UP (ref 22–31)
CREAT SERPL-MCNC: 0.85 MG/DL — SIGNIFICANT CHANGE UP (ref 0.5–1.3)
EOSINOPHIL # BLD AUTO: 0.26 K/UL — SIGNIFICANT CHANGE UP (ref 0–0.5)
EOSINOPHIL NFR BLD AUTO: 2.9 % — SIGNIFICANT CHANGE UP (ref 0–6)
GLUCOSE SERPL-MCNC: 96 MG/DL — SIGNIFICANT CHANGE UP (ref 70–99)
HCT VFR BLD CALC: 39.6 % — SIGNIFICANT CHANGE UP (ref 39–50)
HGB BLD-MCNC: 13.4 G/DL — SIGNIFICANT CHANGE UP (ref 13–17)
IMM GRANULOCYTES NFR BLD AUTO: 0.2 % — SIGNIFICANT CHANGE UP (ref 0–1.5)
INR BLD: 1.03 RATIO — SIGNIFICANT CHANGE UP (ref 0.88–1.16)
LYMPHOCYTES # BLD AUTO: 1.98 K/UL — SIGNIFICANT CHANGE UP (ref 1–3.3)
LYMPHOCYTES # BLD AUTO: 22.2 % — SIGNIFICANT CHANGE UP (ref 13–44)
MCHC RBC-ENTMCNC: 28.7 PG — SIGNIFICANT CHANGE UP (ref 27–34)
MCHC RBC-ENTMCNC: 33.8 GM/DL — SIGNIFICANT CHANGE UP (ref 32–36)
MCV RBC AUTO: 84.8 FL — SIGNIFICANT CHANGE UP (ref 80–100)
MONOCYTES # BLD AUTO: 0.65 K/UL — SIGNIFICANT CHANGE UP (ref 0–0.9)
MONOCYTES NFR BLD AUTO: 7.3 % — SIGNIFICANT CHANGE UP (ref 2–14)
NEUTROPHILS # BLD AUTO: 5.94 K/UL — SIGNIFICANT CHANGE UP (ref 1.8–7.4)
NEUTROPHILS NFR BLD AUTO: 66.6 % — SIGNIFICANT CHANGE UP (ref 43–77)
NRBC # BLD: 0 /100 WBCS — SIGNIFICANT CHANGE UP (ref 0–0)
PLATELET # BLD AUTO: 240 K/UL — SIGNIFICANT CHANGE UP (ref 150–400)
POTASSIUM SERPL-MCNC: 3.7 MMOL/L — SIGNIFICANT CHANGE UP (ref 3.5–5.3)
POTASSIUM SERPL-SCNC: 3.7 MMOL/L — SIGNIFICANT CHANGE UP (ref 3.5–5.3)
PROT SERPL-MCNC: 7.4 GM/DL — SIGNIFICANT CHANGE UP (ref 6–8.3)
PROTHROM AB SERPL-ACNC: 11.5 SEC — SIGNIFICANT CHANGE UP (ref 10–12.9)
RBC # BLD: 4.67 M/UL — SIGNIFICANT CHANGE UP (ref 4.2–5.8)
RBC # FLD: 13.7 % — SIGNIFICANT CHANGE UP (ref 10.3–14.5)
SODIUM SERPL-SCNC: 144 MMOL/L — SIGNIFICANT CHANGE UP (ref 135–145)
TYPE + AB SCN PNL BLD: SIGNIFICANT CHANGE UP
WBC # BLD: 8.92 K/UL — SIGNIFICANT CHANGE UP (ref 3.8–10.5)
WBC # FLD AUTO: 8.92 K/UL — SIGNIFICANT CHANGE UP (ref 3.8–10.5)

## 2019-03-10 PROCEDURE — 70498 CT ANGIOGRAPHY NECK: CPT | Mod: 26

## 2019-03-10 PROCEDURE — 99285 EMERGENCY DEPT VISIT HI MDM: CPT | Mod: 25

## 2019-03-10 RX ORDER — SODIUM CHLORIDE 9 MG/ML
1000 INJECTION INTRAMUSCULAR; INTRAVENOUS; SUBCUTANEOUS ONCE
Qty: 0 | Refills: 0 | Status: COMPLETED | OUTPATIENT
Start: 2019-03-10 | End: 2019-03-10

## 2019-03-10 RX ORDER — AZTREONAM 2 G
1 VIAL (EA) INJECTION
Qty: 14 | Refills: 0 | OUTPATIENT
Start: 2019-03-10 | End: 2019-03-16

## 2019-03-10 RX ORDER — PIPERACILLIN AND TAZOBACTAM 4; .5 G/20ML; G/20ML
3.38 INJECTION, POWDER, LYOPHILIZED, FOR SOLUTION INTRAVENOUS ONCE
Qty: 0 | Refills: 0 | Status: COMPLETED | OUTPATIENT
Start: 2019-03-10 | End: 2019-03-10

## 2019-03-10 RX ADMIN — PIPERACILLIN AND TAZOBACTAM 200 GRAM(S): 4; .5 INJECTION, POWDER, LYOPHILIZED, FOR SOLUTION INTRAVENOUS at 20:25

## 2019-03-10 RX ADMIN — SODIUM CHLORIDE 1000 MILLILITER(S): 9 INJECTION INTRAMUSCULAR; INTRAVENOUS; SUBCUTANEOUS at 17:59

## 2019-03-10 RX ADMIN — PIPERACILLIN AND TAZOBACTAM 3.38 GRAM(S): 4; .5 INJECTION, POWDER, LYOPHILIZED, FOR SOLUTION INTRAVENOUS at 20:55

## 2019-03-10 NOTE — ED ADULT NURSE REASSESSMENT NOTE - NS ED NURSE REASSESS COMMENT FT1
report received from previous RN. patient resting comfortably in stretcher in no acute distress. hematoma noted to left neck where patient had previous sx on carotid artery. VSS. color good. skin warm and dry. respirations even and unlabored. call bell within reach. awaiting CTA result.

## 2019-03-10 NOTE — CONSULT NOTE ADULT - ASSESSMENT
AP    Pt is a 63 year old male S/P left CEA complicated by hematoma formation     -OK to DC with fu in Dr. Sanders vascular surgery office- call this week to make appt  -DC home with oral abx (keflex)  -No other acute vascular surgery intervention     Discussed plan with vascular surgery attending, Dr. Burciaga

## 2019-03-10 NOTE — ED ADULT NURSE NOTE - NSIMPLEMENTINTERV_GEN_ALL_ED
Implemented All Universal Safety Interventions:  [place] to call system. Call bell, personal items and telephone within reach. Instruct patient to call for assistance. Room bathroom lighting operational. Non-slip footwear when patient is off stretcher. Physically safe environment: no spills, clutter or unnecessary equipment. Stretcher in lowest position, wheels locked, appropriate side rails in place.

## 2019-03-10 NOTE — ED PROVIDER NOTE - NS_ ATTENDINGSCRIBEDETAILS _ED_A_ED_FT
I, Rolly Dozier MD,  performed the initial face to face bedside interview with this patient regarding history of present illness, review of symptoms and relevant past medical, social and family history.  I completed an independent physical examination.    The history, relevant review of systems, past medical and surgical history, medical decision making, and physical examination was documented by the scribe in my presence and I attest to the accuracy of the documentation.

## 2019-03-10 NOTE — ED PROVIDER NOTE - OBJECTIVE STATEMENT
62 y/o male with a PMHx of AAA, CAD, CABG, left carotid stenosis, HTN, HLD presents to the ED c/o hematoma and redness to area around L carotid artery, starting yesterday. Pt reports he had carotid artery surgery on 1/11/19 for 80% blockage, done by Dr. Burciaga, was not taken off Plavix during the surgery. Pt subsequently developed a hematoma that frequently became infected.  Recently admitted to [place] for infection, put on IV abx and d/c on PO abx. Finished course 1 week ago. Now coming to ED for recurrent infection of L carotid hematoma, pt noticed redness to the area yesterday. Pt was on 10mg Oxycodone for pain.

## 2019-03-10 NOTE — ED ADULT NURSE NOTE - OBJECTIVE STATEMENT
Pt states he had carotid surgery in Jan developed an infection and was treated in Feb with antibiotics. Pt states last night he noticed some bleeding from site but today he developed a hematoma at the site . Pt said he called the surgeon who told him to come to the ED

## 2019-03-10 NOTE — CONSULT NOTE ADULT - SUBJECTIVE AND OBJECTIVE BOX
HPI    Vascular surgery called to eval a 63 year male PMHx of AAA, CAD, CABG, left carotid stenosis, HTN, HLD presents to the ED c/o hematoma and redness to area around L carotid artery, starting yesterday s/p left CEA on 1/11/19. Pt was on duel plt therapy (ASA, plavix) and subsequently developed a hematoma which became infected.  Pt was also recently treated and admitted to [facility] for infection for which he received IV abx and d/c on PO abx. Finished course 1 week ago. Now present t ED for recurrent infection of L carotid surgical site. CTA done showing no acute vascular surgery pathology. Vitals stable. Denies fever/cp/sob/n/v/d/c. Vitals stable    PHYSICAL EXAM:  Constitutional: NAD, GCS: 15/15  AOX3  Eyes:  WNL  ENMT:  WNL  Neck:  WNL, non tender  Back: Non tender  Respiratory: CTABL  Cardiovascular:  S1+S2+0  Gastrointestinal: Soft, ND , NT  Genitourinary:  WNL  Extremities: NV intact  Vascular:  Intact,  Surgical site: healing surgical scar from left CEA, mod eccmosis noted, no erythema/exudate/indruation ntoed  Neurological: No focal neurological deficit,  CN, motor and sensory system grossly intact.  Skin: WNL  Musculoskeletal: WNL  Psychiatric: Grossly WNL                          13.4   8.92  )-----------( 240      ( 10 Mar 2019 16:48 )             39.6     03-10    144  |  111<H>  |  18  ----------------------------<  96  3.7   |  25  |  0.85    Ca    10.1      10 Mar 2019 16:48    TPro  7.4  /  Alb  3.9  /  TBili  0.3  /  DBili  x   /  AST  27  /  ALT  27  /  AlkPhos  106  03-10

## 2019-03-11 ENCOUNTER — APPOINTMENT (OUTPATIENT)
Age: 64
End: 2019-03-11
Payer: COMMERCIAL

## 2019-03-11 VITALS
SYSTOLIC BLOOD PRESSURE: 146 MMHG | OXYGEN SATURATION: 97 % | WEIGHT: 152 LBS | BODY MASS INDEX: 23.86 KG/M2 | HEIGHT: 67 IN | DIASTOLIC BLOOD PRESSURE: 82 MMHG | HEART RATE: 92 BPM | TEMPERATURE: 99.5 F

## 2019-03-11 PROCEDURE — 99024 POSTOP FOLLOW-UP VISIT: CPT

## 2019-03-11 NOTE — HISTORY OF PRESENT ILLNESS
[FreeTextEntry1] : 63 year old male s/p left carotid endarterectomy last month.\par He is on DAPT for recent PCI with YULIA.\par He developed a moderate sized left neck hematoma with oozing from his incision.\par He denies dysphagia, neck pain, dyspnea or noisy breathing.\par \par Interval History: S/P hematoma evacuation and wash out 3 weeks ago.\par Wound near completely healed, however he developed a blister on the inferior aspect of the incision over the last 2 days. Was seen in  ED yesterday. CTA performed yesterday showed no hematoma or collection around the carotid artery. Patch intact.\par

## 2019-03-11 NOTE — PHYSICAL EXAM
[JVD] : no jugular venous distention  [Normal Heart Sounds] : normal heart sounds [Normal Rate and Rhythm] : normal rate and rhythm [2+] : left 2+ [Ankle Swelling (On Exam)] : not present [Abdomen Masses] : No abdominal masses [Alert] : alert [Oriented to Person] : oriented to person [Oriented to Place] : oriented to place [Calm] : calm [de-identified] : AAO x 3, NAD [de-identified] : Incision healing well. Bruising around incision. 1cm x 1cm blister debrided. No purulence

## 2019-03-11 NOTE — ASSESSMENT
[FreeTextEntry1] : 63 year old male s/p left CEA for symptomatic stenosis last month. \par Now s/p hematoma evacuation 4 weeks ago. \par Has new area of skin bleeding. CTA negative.\par Complete PO antibiotic course\par To discuss with cardiologist about stopping plavix. (Now 3 months s/p PCI)\par RTO next week

## 2019-03-12 ENCOUNTER — RESULT REVIEW (OUTPATIENT)
Age: 64
End: 2019-03-12

## 2019-03-12 ENCOUNTER — INPATIENT (INPATIENT)
Facility: HOSPITAL | Age: 64
LOS: 7 days | Discharge: HOME HEALTH | End: 2019-03-20
Attending: EMERGENCY MEDICINE
Payer: COMMERCIAL

## 2019-03-12 VITALS — WEIGHT: 149.91 LBS

## 2019-03-12 DIAGNOSIS — Z98.890 OTHER SPECIFIED POSTPROCEDURAL STATES: Chronic | ICD-10-CM

## 2019-03-12 PROBLEM — G45.9 TIA (TRANSIENT ISCHEMIC ATTACK): Status: ACTIVE | Noted: 2018-12-04

## 2019-03-12 LAB
ALBUMIN SERPL ELPH-MCNC: 3.8 G/DL — SIGNIFICANT CHANGE UP (ref 3.3–5)
ALP SERPL-CCNC: 97 U/L — SIGNIFICANT CHANGE UP (ref 40–120)
ALT FLD-CCNC: 27 U/L — SIGNIFICANT CHANGE UP (ref 12–78)
ANION GAP SERPL CALC-SCNC: 8 MMOL/L — SIGNIFICANT CHANGE UP (ref 5–17)
APTT BLD: 37 SEC — HIGH (ref 27.5–36.3)
AST SERPL-CCNC: 28 U/L — SIGNIFICANT CHANGE UP (ref 15–37)
BASOPHILS # BLD AUTO: 0.07 K/UL — SIGNIFICANT CHANGE UP (ref 0–0.2)
BASOPHILS NFR BLD AUTO: 0.9 % — SIGNIFICANT CHANGE UP (ref 0–2)
BILIRUB SERPL-MCNC: 0.4 MG/DL — SIGNIFICANT CHANGE UP (ref 0.2–1.2)
BLD GP AB SCN SERPL QL: SIGNIFICANT CHANGE UP
BUN SERPL-MCNC: 13 MG/DL — SIGNIFICANT CHANGE UP (ref 7–23)
CALCIUM SERPL-MCNC: 10.3 MG/DL — HIGH (ref 8.5–10.1)
CHLORIDE SERPL-SCNC: 109 MMOL/L — HIGH (ref 96–108)
CO2 SERPL-SCNC: 23 MMOL/L — SIGNIFICANT CHANGE UP (ref 22–31)
CREAT SERPL-MCNC: 1.06 MG/DL — SIGNIFICANT CHANGE UP (ref 0.5–1.3)
EOSINOPHIL # BLD AUTO: 0.06 K/UL — SIGNIFICANT CHANGE UP (ref 0–0.5)
EOSINOPHIL NFR BLD AUTO: 0.8 % — SIGNIFICANT CHANGE UP (ref 0–6)
GLUCOSE SERPL-MCNC: 111 MG/DL — HIGH (ref 70–99)
GRAM STN FLD: SIGNIFICANT CHANGE UP
HCT VFR BLD CALC: 40.6 % — SIGNIFICANT CHANGE UP (ref 39–50)
HGB BLD-MCNC: 13.4 G/DL — SIGNIFICANT CHANGE UP (ref 13–17)
IMM GRANULOCYTES NFR BLD AUTO: 0.3 % — SIGNIFICANT CHANGE UP (ref 0–1.5)
INR BLD: 1.19 RATIO — HIGH (ref 0.88–1.16)
LACTATE SERPL-SCNC: 1.1 MMOL/L — SIGNIFICANT CHANGE UP (ref 0.7–2)
LYMPHOCYTES # BLD AUTO: 1.55 K/UL — SIGNIFICANT CHANGE UP (ref 1–3.3)
LYMPHOCYTES # BLD AUTO: 19.9 % — SIGNIFICANT CHANGE UP (ref 13–44)
MCHC RBC-ENTMCNC: 28.4 PG — SIGNIFICANT CHANGE UP (ref 27–34)
MCHC RBC-ENTMCNC: 33 GM/DL — SIGNIFICANT CHANGE UP (ref 32–36)
MCV RBC AUTO: 86 FL — SIGNIFICANT CHANGE UP (ref 80–100)
METHOD TYPE: SIGNIFICANT CHANGE UP
MONOCYTES # BLD AUTO: 0.93 K/UL — HIGH (ref 0–0.9)
MONOCYTES NFR BLD AUTO: 11.9 % — SIGNIFICANT CHANGE UP (ref 2–14)
MSSA DNA SPEC QL NAA+PROBE: SIGNIFICANT CHANGE UP
NEUTROPHILS # BLD AUTO: 5.16 K/UL — SIGNIFICANT CHANGE UP (ref 1.8–7.4)
NEUTROPHILS NFR BLD AUTO: 66.2 % — SIGNIFICANT CHANGE UP (ref 43–77)
NRBC # BLD: 0 /100 WBCS — SIGNIFICANT CHANGE UP (ref 0–0)
PLATELET # BLD AUTO: 238 K/UL — SIGNIFICANT CHANGE UP (ref 150–400)
POTASSIUM SERPL-MCNC: 4.5 MMOL/L — SIGNIFICANT CHANGE UP (ref 3.5–5.3)
POTASSIUM SERPL-SCNC: 4.5 MMOL/L — SIGNIFICANT CHANGE UP (ref 3.5–5.3)
PROT SERPL-MCNC: 7.9 GM/DL — SIGNIFICANT CHANGE UP (ref 6–8.3)
PROTHROM AB SERPL-ACNC: 13.3 SEC — HIGH (ref 10–12.9)
RBC # BLD: 4.72 M/UL — SIGNIFICANT CHANGE UP (ref 4.2–5.8)
RBC # FLD: 13.8 % — SIGNIFICANT CHANGE UP (ref 10.3–14.5)
SODIUM SERPL-SCNC: 140 MMOL/L — SIGNIFICANT CHANGE UP (ref 135–145)
TYPE + AB SCN PNL BLD: SIGNIFICANT CHANGE UP
WBC # BLD: 7.79 K/UL — SIGNIFICANT CHANGE UP (ref 3.8–10.5)
WBC # FLD AUTO: 7.79 K/UL — SIGNIFICANT CHANGE UP (ref 3.8–10.5)

## 2019-03-12 PROCEDURE — 70498 CT ANGIOGRAPHY NECK: CPT | Mod: 26

## 2019-03-12 PROCEDURE — 93010 ELECTROCARDIOGRAM REPORT: CPT

## 2019-03-12 PROCEDURE — 99285 EMERGENCY DEPT VISIT HI MDM: CPT

## 2019-03-12 PROCEDURE — 88304 TISSUE EXAM BY PATHOLOGIST: CPT | Mod: 26

## 2019-03-12 PROCEDURE — 93306 TTE W/DOPPLER COMPLETE: CPT | Mod: 26

## 2019-03-12 RX ORDER — PIPERACILLIN AND TAZOBACTAM 4; .5 G/20ML; G/20ML
3.38 INJECTION, POWDER, LYOPHILIZED, FOR SOLUTION INTRAVENOUS EVERY 8 HOURS
Qty: 0 | Refills: 0 | Status: DISCONTINUED | OUTPATIENT
Start: 2019-03-12 | End: 2019-03-12

## 2019-03-12 RX ORDER — CEFAZOLIN SODIUM 1 G
VIAL (EA) INJECTION
Qty: 0 | Refills: 0 | Status: DISCONTINUED | OUTPATIENT
Start: 2019-03-12 | End: 2019-03-12

## 2019-03-12 RX ORDER — SODIUM CHLORIDE 9 MG/ML
2040 INJECTION INTRAMUSCULAR; INTRAVENOUS; SUBCUTANEOUS ONCE
Qty: 0 | Refills: 0 | Status: COMPLETED | OUTPATIENT
Start: 2019-03-12 | End: 2019-03-12

## 2019-03-12 RX ORDER — PIPERACILLIN AND TAZOBACTAM 4; .5 G/20ML; G/20ML
3.38 INJECTION, POWDER, LYOPHILIZED, FOR SOLUTION INTRAVENOUS ONCE
Qty: 0 | Refills: 0 | Status: COMPLETED | OUTPATIENT
Start: 2019-03-12 | End: 2019-03-12

## 2019-03-12 RX ORDER — VANCOMYCIN HCL 1 G
1000 VIAL (EA) INTRAVENOUS EVERY 12 HOURS
Qty: 0 | Refills: 0 | Status: DISCONTINUED | OUTPATIENT
Start: 2019-03-12 | End: 2019-03-12

## 2019-03-12 RX ORDER — CLOPIDOGREL BISULFATE 75 MG/1
75 TABLET, FILM COATED ORAL DAILY
Qty: 0 | Refills: 0 | Status: DISCONTINUED | OUTPATIENT
Start: 2019-03-12 | End: 2019-03-12

## 2019-03-12 RX ORDER — METOPROLOL TARTRATE 50 MG
1 TABLET ORAL
Qty: 0 | Refills: 0 | COMMUNITY

## 2019-03-12 RX ORDER — FAMOTIDINE 10 MG/ML
20 INJECTION INTRAVENOUS EVERY 12 HOURS
Qty: 0 | Refills: 0 | Status: DISCONTINUED | OUTPATIENT
Start: 2019-03-12 | End: 2019-03-12

## 2019-03-12 RX ORDER — ATORVASTATIN CALCIUM 80 MG/1
20 TABLET, FILM COATED ORAL AT BEDTIME
Qty: 0 | Refills: 0 | Status: DISCONTINUED | OUTPATIENT
Start: 2019-03-12 | End: 2019-03-12

## 2019-03-12 RX ORDER — CEFAZOLIN SODIUM 1 G
2000 VIAL (EA) INJECTION ONCE
Qty: 0 | Refills: 0 | Status: COMPLETED | OUTPATIENT
Start: 2019-03-12 | End: 2019-03-12

## 2019-03-12 RX ORDER — ACETAMINOPHEN 500 MG
650 TABLET ORAL EVERY 6 HOURS
Qty: 0 | Refills: 0 | Status: DISCONTINUED | OUTPATIENT
Start: 2019-03-12 | End: 2019-03-12

## 2019-03-12 RX ORDER — VANCOMYCIN HCL 1 G
1000 VIAL (EA) INTRAVENOUS ONCE
Qty: 0 | Refills: 0 | Status: DISCONTINUED | OUTPATIENT
Start: 2019-03-12 | End: 2019-03-12

## 2019-03-12 RX ORDER — ROSUVASTATIN CALCIUM 5 MG/1
1 TABLET ORAL
Qty: 0 | Refills: 0 | COMMUNITY

## 2019-03-12 RX ORDER — DOCUSATE SODIUM 100 MG
100 CAPSULE ORAL THREE TIMES A DAY
Qty: 0 | Refills: 0 | Status: DISCONTINUED | OUTPATIENT
Start: 2019-03-12 | End: 2019-03-12

## 2019-03-12 RX ORDER — DOCUSATE SODIUM 100 MG
0 CAPSULE ORAL
Qty: 0 | Refills: 0 | COMMUNITY

## 2019-03-12 RX ORDER — METOPROLOL TARTRATE 50 MG
25 TABLET ORAL DAILY
Qty: 0 | Refills: 0 | Status: DISCONTINUED | OUTPATIENT
Start: 2019-03-12 | End: 2019-03-12

## 2019-03-12 RX ORDER — CEFAZOLIN SODIUM 1 G
2000 VIAL (EA) INJECTION EVERY 8 HOURS
Qty: 0 | Refills: 0 | Status: DISCONTINUED | OUTPATIENT
Start: 2019-03-12 | End: 2019-03-12

## 2019-03-12 RX ADMIN — Medication 100 MILLIGRAM(S): at 19:16

## 2019-03-12 RX ADMIN — SODIUM CHLORIDE 2040 MILLILITER(S): 9 INJECTION INTRAMUSCULAR; INTRAVENOUS; SUBCUTANEOUS at 12:20

## 2019-03-12 RX ADMIN — PIPERACILLIN AND TAZOBACTAM 200 GRAM(S): 4; .5 INJECTION, POWDER, LYOPHILIZED, FOR SOLUTION INTRAVENOUS at 12:20

## 2019-03-12 NOTE — H&P ADULT - NSICDXPASTSURGICALHX_GEN_ALL_CORE_FT
PAST SURGICAL HISTORY:  History of left-sided carotid endarterectomy     right hernia repair     S/P AAA repair 2010    S/P CABG (Coronary Artery Bypass Graft) 5/2009 x 4 vessels    S/P Tonsillectomy

## 2019-03-12 NOTE — ED STATDOCS - CLINICAL SUMMARY MEDICAL DECISION MAKING FREE TEXT BOX
62 y/o male with a PMHx of TIA, AAA, CAD, CABG, left carotid stenosis, HTN, HLD presents to the ED after called back by PA regarding positive blood cultures. Pt was seen at [facility] on 3/10/19 for evaluation of left carotid artery surgical infection s/p surgery 1 month ago to left carotid artery. Plan: gram positive cocci 1/2 bottles staph aureus. Plan vascular consult.

## 2019-03-12 NOTE — H&P ADULT - NSHPPHYSICALEXAM_GEN_ALL_CORE
general: nad, aao x3  heent: perrla, ncat, neck wound cdi, no bruit auscultated  pulm: cta x2 no wheezing  cards: s1s2+, no tachycardia  abdomen: soft, nt, nd  extremities; no deformity, palpable pulses throughout

## 2019-03-12 NOTE — ED STATDOCS - OBJECTIVE STATEMENT
64 y/o male with a PMHx of TIA, AAA, CAD, CABG, left carotid stenosis, HTN, HLD presents to the ED after called back by PA regarding positive blood cultures. Pt was seen at [facility] on 3/10/19 for evaluation of left carotid artery surgical infection s/p surgery 1 month ago to left carotid artery. Pt reports first surgery done at [facility] in February and second done at [facility]. Since 3/10/19, denies fevers. Pt reports his wife notes he was sweating last night and shivering while watching television. Surgery: [place].

## 2019-03-12 NOTE — H&P ADULT - ASSESSMENT
This is a 64 yo male with hx of recent CEA and takeback for subsequent infected hematoma. He returns to the er today for +ve blood cultures taken on 3/10 which returned gram +cocci. He also reports an episode of bleeding from the surgical site last night which he says soaked his pillow.    - admit to Dr Burciaga's service  - repeat cx pending  - iv abx - vanc/zosyn for now - ID consulted  - hold aspirin/plavix per cards reccs with recent bleeding  - home metop/statin started  - discussed w Dr Burciaga This is a 64 yo male with hx of recent CEA and takeback for subsequent infected hematoma. He returns to the er today for +ve blood cultures taken on 3/10 which returned gram +cocci. He also reports an episode of bleeding from the surgical site last night which he says soaked his pillow.    - admit to Dr Burciaga's service  - repeat cx pending  - iv abx - vanc/zosyn for now - ID consulted  - hold aspirin per cards reccs with recent bleeding  - home metop/statin started  - discussed w Dr Burciaga

## 2019-03-12 NOTE — ED STATDOCS - SKIN, MLM
+left neck surgical incision, minimal redness, hematoma to lower portion, non fluctuant, non tender.

## 2019-03-12 NOTE — HISTORY OF PRESENT ILLNESS
[FreeTextEntry1] : 63 year old male s/p left carotid endarterectomy 3 days ago by Dr. Burciaga.\par He is on DAPT for recent PCI with YULIA.\par He developed a moderate sized left neck hematoma with oozing from his incision, comes for evaluation of bleeding area.\par He denies dysphagia, neck pain, dyspnea or noisy breathing.\par

## 2019-03-12 NOTE — ASSESSMENT
[FreeTextEntry1] : 63 year old male s/p left carotid endarterectomy 3 days ago.\par He is on DAPT for recent PCI with YULIA.\par He developed a moderate sized left neck hematoma with oozing from his incision. No evidence of infection.\par Incision line oversewn using sterile technique. procedure was well tolerated\par \par \par -Follow up with Dr Burciaga as scheduled\par -Continue antiplatelet therapy\par -Advised to call immediately if he develops worsening swelling, dysphagia or dyspnea

## 2019-03-12 NOTE — H&P ADULT - HISTORY OF PRESENT ILLNESS
This is a 64 yo male with hx of recent CEA and takeback for subsequent infected hematoma. He returns to the er today for +ve blood cultures taken on 3/10 which returned gram +cocci. He also reports an episode of bleeding from the surgical site last night which he says soaked his pillow. He denies fevers but says he may have had chills last evening. He denies sob, nausea, vomiting, diarrhea, chest pain. His neck is cdi, and dressings have been replaced.

## 2019-03-12 NOTE — ED POST DISCHARGE NOTE - DETAILS
Pt made aware of findings, advised to return to ED. - Demarcus Dacotsa PA-C Pt. returned to the ED on 3/12 and is admitted to the hospital.  JACKY pryor PA-C

## 2019-03-12 NOTE — ED ADULT NURSE NOTE - OBJECTIVE STATEMENT
here for positive blood culture only 1 set, had carotid surgery left side 2 months ago on plavix not healing right blood cultures were done 2 days ago

## 2019-03-12 NOTE — ED STATDOCS - ATTENDING CONTRIBUTION TO CARE
I, Gera Ansari MD,  performed the initial face to face bedside interview with this patient regarding history of present illness, review of symptoms and relevant past medical, social and family history.  I completed an independent physical examination.  I was the initial provider who evaluated this patient. I have signed out the follow up of any pending tests (i.e. labs, radiological studies) to the ACP.  I have communicated the patient’s plan of care and disposition with the ACP.  The history, relevant review of systems, past medical and surgical history, medical decision making, and physical examination was documented by the scribe in my presence and I attest to the accuracy of the documentation.

## 2019-03-12 NOTE — H&P ADULT - NSICDXPASTMEDICALHX_GEN_ALL_CORE_FT
PAST MEDICAL HISTORY:  AAA (Abdominal Aortic Aneurysm) Dx. 5/2009- attempted repair  12/2009 surgery aborted due to hypotension.r    ADD (Attention Deficit Disorder)     CABG (Coronary Artery Bypass Graft)x4 6/09     CAD (Coronary Artery Disease)     Carotid stenosis, left     Hematoma     HTN (Hypertension), Benign     Hyperlipidemia     Stented coronary artery 12/10/2018         D1    TIA (transient ischemic attack) 11/2018    TIA (transient ischemic attack) 11/2018

## 2019-03-12 NOTE — H&P ADULT - NSICDXFAMILYHX_GEN_ALL_CORE_FT
FAMILY HISTORY:  Grandparent  Still living? No  Family history of myocardial infarction, Age at diagnosis: Age Unknown

## 2019-03-12 NOTE — CONSULT NOTE ADULT - SUBJECTIVE AND OBJECTIVE BOX
Patient is a 63y old  Male who presents with a chief complaint of +VE BLOOD CX (12 Mar 2019 12:12)    HPI:  This is a 62 yo male with hx of recent CEA and takeback for subsequent infected hematoma. He returns to the er today for +ve blood cultures taken on 3/10 which returned gram +cocci. He also reports an episode of bleeding from the surgical site last night which he says soaked his pillow. He denies fevers but says he may have had chills last evening. He denies sob, nausea, vomiting, diarrhea, chest pain. His neck is cdi, and dressings have been replaced.       PMH: as above  PSH: as above  Meds: per reconciliation sheet, noted below  MEDICATIONS  (STANDING):  atorvastatin 20 milliGRAM(s) Oral at bedtime  ceFAZolin   IVPB      ceFAZolin   IVPB 2000 milliGRAM(s) IV Intermittent once  ceFAZolin   IVPB 2000 milliGRAM(s) IV Intermittent every 8 hours  clopidogrel Tablet 75 milliGRAM(s) Oral daily  docusate sodium 100 milliGRAM(s) Oral three times a day  metoprolol succinate ER 25 milliGRAM(s) Oral daily      Allergies    No Known Allergies    Intolerances      Social: no smoking, no alcohol, no illegal drugs; no recent travel, no exposure to TB  FAMILY HISTORY:  Family history of myocardial infarction (Grandparent)     no history of premature cardiovascular disease in first degree relatives    ROS: no HA, no dizziness, no sore throat, no blurry vision, no CP, no palpitations, no SOB, no cough, no abdominal pain, no diarrhea, no N/V, no dysuria, no leg pain, no claudication, no rash, no joint aches, no rectal pain or bleeding, no night sweats  All other systems reviewed and are negative    Vital Signs Last 24 Hrs  T(C): 36.9 (12 Mar 2019 10:39), Max: 36.9 (12 Mar 2019 10:39)  T(F): 98.4 (12 Mar 2019 10:39), Max: 98.4 (12 Mar 2019 10:39)  HR: 63 (12 Mar 2019 13:59) (63 - 78)  BP: 107/56 (12 Mar 2019 13:59) (107/56 - 122/73)  BP(mean): --  RR: 18 (12 Mar 2019 13:59) (18 - 18)  SpO2: 98% (12 Mar 2019 13:59) (98% - 98%)  Daily     Daily     PE:  Constitutional: frail looking  HEENT: NC/AT, EOMI, PERRLA, conjunctivae clear; ears and nose atraumatic; pharynx benign  Neck: supple; thyroid not palpable, L carotid hematoma w bloody drainage   Back: no tenderness  Respiratory: respiratory effort normal; clear to auscultation  Cardiovascular: S1S2 regular, no murmurs  Abdomen: soft, not tender, not distended, positive BS; liver and spleen WNL  Genitourinary: no suprapubic tenderness  Lymphatic: no LN palpable  Musculoskeletal: no muscle tenderness, no joint swelling or tenderness  Extremities: no pedal edema  Neurological/ Psychiatric:  moving all extremities  Skin: no rashes; no palpable lesions    Labs: all available labs reviewed                        13.4   7.79  )-----------( 238      ( 12 Mar 2019 11:35 )             40.6     03-12    140  |  109<H>  |  13  ----------------------------<  111<H>  4.5   |  23  |  1.06    Ca    10.3<H>      12 Mar 2019 11:35    TPro  7.9  /  Alb  3.8  /  TBili  0.4  /  DBili  x   /  AST  28  /  ALT  27  /  AlkPhos  97  03-12     LIVER FUNCTIONS - ( 12 Mar 2019 11:35 )  Alb: 3.8 g/dL / Pro: 7.9 gm/dL / ALK PHOS: 97 U/L / ALT: 27 U/L / AST: 28 U/L / GGT: x                 Radiology: all available radiological tests reviewed  < from: CT Angio Neck w/ IV Cont (03.10.19 @ 18:22) >    EXAM:  CT ANGIO NECK (W)AW IC                            PROCEDURE DATE:  03/10/2019          INTERPRETATION:      EXAM:    CT Angiography Neck With Contrast     EXAM DATE/TIME:    3/10/2019 6:18 PM     CLINICAL HISTORY:    63 years old, male; Signs and symptoms; Other: Swelling redness; Prior   surgery; Surgery date: 1-6 months     TECHNIQUE:    Axial computed tomographic angiography images of the neck with   intravenous   contrast using CT angiography protocol.    Coronal and sagittal reformatted images were created and reviewed.    MIP reconstructed images were created and reviewed.     COMPARISON:    US CAROTID DUPLEX COMPLETE BILATERAL 11/20/2018 3:13 PM , CTA dated   2/6/2019    FINDINGS:     VASCULATURE:    Right common carotid artery: Normal. No significant stenosis. No   dissection or   occlusion.    Right internal carotid artery:  Less than 50% stenosis proximal right   ICA.    Right external carotid artery: Normal. No occlusion or significant   stenosis.    Right vertebral artery: Normal. No significant stenosis. No dissection   or   occlusion.      Left common carotid artery: Normal. No significant stenosis. No   dissection or   occlusion.    Left internal carotid artery:  Edematous change soft tissues of the left   neck  and left sternocleidomastoid muscle with decrease in size of previously   noted hematoma with probable 1.5 cm residual. Previous left carotid   endarterectomy. No significant stenosis.    Left external carotid artery: Normal. No occlusion or significant   stenosis.    Left vertebral artery: Normal. No significant stenosis. No dissection or   occlusion.       NECK:    Thyroid:  Several low-attenuation thyroid nodules, largest 6 mm.    Bones/joints: No acute fracture.    Soft tissues:  See Left Internal Carotid Artery Finding.     IMPRESSION:   1. Edematous change soft tissues of the left neck   and left sternocleidomastoid muscle with decrease in size of previously   noted hematoma with probable 1.5 cm residual.   2. Less than 50% stenosis proximal right ICA.     COMMENT:    Reference per NASCET criteria for degree of stenosis: Mild: less than   50%   stenosis. Moderate: 50-69% stenosis. Severe: 70-94% stenosis. Near   occlusion:   95-99% stenosis.      Advanced directives addressed: full resuscitation Patient is a 63y old  Male who presents with a chief complaint of +VE BLOOD CX (12 Mar 2019 12:12)    HPI:    64 y/o male with a PMHx of AAA, CAD, CABG, left carotid stenosis, HTN, HLD initially presented 3/10 to  ED c/o hematoma and redness to area around L carotid artery, starting 3/9. Pt reports he had carotid artery surgery on 1/11/19 for 80% blockage, done by Dr. Burciaga, was not taken off Plavix during the surgery. Pt subsequently developed a hematoma that frequently became infected.  Recently admitted to [facility] for infection, put on IV abx and d/c on PO abx. Finished course 1 week ago. Pt returned 3/12 d/t positive blood cx with MSSA, had CT neck 3/10 showing decreasing size of L carotid hematoma, was eval by vascular and given IV vanco/zosyn.       PMH: as above  PSH: as above  Meds: per reconciliation sheet, noted below  MEDICATIONS  (STANDING):  atorvastatin 20 milliGRAM(s) Oral at bedtime  ceFAZolin   IVPB      ceFAZolin   IVPB 2000 milliGRAM(s) IV Intermittent once  ceFAZolin   IVPB 2000 milliGRAM(s) IV Intermittent every 8 hours  clopidogrel Tablet 75 milliGRAM(s) Oral daily  docusate sodium 100 milliGRAM(s) Oral three times a day  metoprolol succinate ER 25 milliGRAM(s) Oral daily      Allergies    No Known Allergies    Intolerances      Social: no smoking, no alcohol, no illegal drugs; no recent travel, no exposure to TB  FAMILY HISTORY:  Family history of myocardial infarction (Grandparent)     no history of premature cardiovascular disease in first degree relatives    ROS: no HA, no dizziness, no sore throat, no blurry vision, no CP, no palpitations, no SOB, no cough, no abdominal pain, no diarrhea, no N/V, no dysuria, no leg pain, no claudication, no rash, no joint aches, no rectal pain or bleeding, no night sweats  All other systems reviewed and are negative    Vital Signs Last 24 Hrs  T(C): 36.9 (12 Mar 2019 10:39), Max: 36.9 (12 Mar 2019 10:39)  T(F): 98.4 (12 Mar 2019 10:39), Max: 98.4 (12 Mar 2019 10:39)  HR: 63 (12 Mar 2019 13:59) (63 - 78)  BP: 107/56 (12 Mar 2019 13:59) (107/56 - 122/73)  BP(mean): --  RR: 18 (12 Mar 2019 13:59) (18 - 18)  SpO2: 98% (12 Mar 2019 13:59) (98% - 98%)  Daily     Daily     PE:  Constitutional: frail looking  HEENT: NC/AT, EOMI, PERRLA, conjunctivae clear; ears and nose atraumatic; pharynx benign  Neck: supple; thyroid not palpable, L carotid hematoma w bloody drainage   Back: no tenderness  Respiratory: respiratory effort normal; clear to auscultation  Cardiovascular: S1S2 regular, no murmurs  Abdomen: soft, not tender, not distended, positive BS; liver and spleen WNL  Genitourinary: no suprapubic tenderness  Lymphatic: no LN palpable  Musculoskeletal: no muscle tenderness, no joint swelling or tenderness  Extremities: no pedal edema  Neurological/ Psychiatric:  moving all extremities  Skin: no rashes; no palpable lesions    Labs: all available labs reviewed                        13.4   7.79  )-----------( 238      ( 12 Mar 2019 11:35 )             40.6     03-12    140  |  109<H>  |  13  ----------------------------<  111<H>  4.5   |  23  |  1.06    Ca    10.3<H>      12 Mar 2019 11:35    TPro  7.9  /  Alb  3.8  /  TBili  0.4  /  DBili  x   /  AST  28  /  ALT  27  /  AlkPhos  97  03-12     LIVER FUNCTIONS - ( 12 Mar 2019 11:35 )  Alb: 3.8 g/dL / Pro: 7.9 gm/dL / ALK PHOS: 97 U/L / ALT: 27 U/L / AST: 28 U/L / GGT: x                 Radiology: all available radiological tests reviewed  < from: CT Angio Neck w/ IV Cont (03.10.19 @ 18:22) >    EXAM:  CT ANGIO NECK (W)AW IC                            PROCEDURE DATE:  03/10/2019          INTERPRETATION:      EXAM:    CT Angiography Neck With Contrast     EXAM DATE/TIME:    3/10/2019 6:18 PM     CLINICAL HISTORY:    63 years old, male; Signs and symptoms; Other: Swelling redness; Prior   surgery; Surgery date: 1-6 months     TECHNIQUE:    Axial computed tomographic angiography images of the neck with   intravenous   contrast using CT angiography protocol.    Coronal and sagittal reformatted images were created and reviewed.    MIP reconstructed images were created and reviewed.     COMPARISON:    US CAROTID DUPLEX COMPLETE BILATERAL 11/20/2018 3:13 PM , CTA dated   2/6/2019    FINDINGS:     VASCULATURE:    Right common carotid artery: Normal. No significant stenosis. No   dissection or   occlusion.    Right internal carotid artery:  Less than 50% stenosis proximal right   ICA.    Right external carotid artery: Normal. No occlusion or significant   stenosis.    Right vertebral artery: Normal. No significant stenosis. No dissection   or   occlusion.      Left common carotid artery: Normal. No significant stenosis. No   dissection or   occlusion.    Left internal carotid artery:  Edematous change soft tissues of the left   neck  and left sternocleidomastoid muscle with decrease in size of previously   noted hematoma with probable 1.5 cm residual. Previous left carotid   endarterectomy. No significant stenosis.    Left external carotid artery: Normal. No occlusion or significant   stenosis.    Left vertebral artery: Normal. No significant stenosis. No dissection or   occlusion.       NECK:    Thyroid:  Several low-attenuation thyroid nodules, largest 6 mm.    Bones/joints: No acute fracture.    Soft tissues:  See Left Internal Carotid Artery Finding.     IMPRESSION:   1. Edematous change soft tissues of the left neck   and left sternocleidomastoid muscle with decrease in size of previously   noted hematoma with probable 1.5 cm residual.   2. Less than 50% stenosis proximal right ICA.     COMMENT:    Reference per NASCET criteria for degree of stenosis: Mild: less than   50%   stenosis. Moderate: 50-69% stenosis. Severe: 70-94% stenosis. Near   occlusion:   95-99% stenosis.      Advanced directives addressed: full resuscitation

## 2019-03-12 NOTE — PHYSICAL EXAM
[JVD] : no jugular venous distention  [2+] : left 2+ [Ankle Swelling (On Exam)] : not present [Abdomen Masses] : No abdominal masses [de-identified] : AAO x 3, NAD [de-identified] : Moderate sized, soft left neck hematoma, actively oozing

## 2019-03-12 NOTE — ED ADULT TRIAGE NOTE - CHIEF COMPLAINT QUOTE
pt states he was seen on sunday in ED for evaluation of left carotid artery surgical infection. pt states he had surgery 1 month ago to left carotid artery and was seen for possible infection. called back today and told to come to ED for positive blood cutulures. pt denies fever or worsening symptoms

## 2019-03-13 LAB
-  AMPICILLIN/SULBACTAM: SIGNIFICANT CHANGE UP
-  CEFAZOLIN: SIGNIFICANT CHANGE UP
-  CLINDAMYCIN: SIGNIFICANT CHANGE UP
-  ERYTHROMYCIN: SIGNIFICANT CHANGE UP
-  GENTAMICIN: SIGNIFICANT CHANGE UP
-  OXACILLIN: SIGNIFICANT CHANGE UP
-  PENICILLIN: SIGNIFICANT CHANGE UP
-  RIFAMPIN: SIGNIFICANT CHANGE UP
-  TETRACYCLINE: SIGNIFICANT CHANGE UP
-  TRIMETHOPRIM/SULFAMETHOXAZOLE: SIGNIFICANT CHANGE UP
-  VANCOMYCIN: SIGNIFICANT CHANGE UP
ANION GAP SERPL CALC-SCNC: 10 MMOL/L — SIGNIFICANT CHANGE UP (ref 5–17)
BASOPHILS # BLD AUTO: 0 K/UL — SIGNIFICANT CHANGE UP (ref 0–0.2)
BASOPHILS NFR BLD AUTO: 0 % — SIGNIFICANT CHANGE UP (ref 0–2)
BUN SERPL-MCNC: 10 MG/DL — SIGNIFICANT CHANGE UP (ref 7–23)
CALCIUM SERPL-MCNC: 8.6 MG/DL — SIGNIFICANT CHANGE UP (ref 8.5–10.1)
CHLORIDE SERPL-SCNC: 112 MMOL/L — HIGH (ref 96–108)
CO2 SERPL-SCNC: 19 MMOL/L — LOW (ref 22–31)
CREAT SERPL-MCNC: 0.74 MG/DL — SIGNIFICANT CHANGE UP (ref 0.5–1.3)
CULTURE RESULTS: SIGNIFICANT CHANGE UP
EOSINOPHIL # BLD AUTO: 0 K/UL — SIGNIFICANT CHANGE UP (ref 0–0.5)
EOSINOPHIL NFR BLD AUTO: 0 % — SIGNIFICANT CHANGE UP (ref 0–6)
GLUCOSE SERPL-MCNC: 164 MG/DL — HIGH (ref 70–99)
GRAM STN FLD: SIGNIFICANT CHANGE UP
HCT VFR BLD CALC: 30.3 % — LOW (ref 39–50)
HGB BLD-MCNC: 10 G/DL — LOW (ref 13–17)
LYMPHOCYTES # BLD AUTO: 0.49 K/UL — LOW (ref 1–3.3)
LYMPHOCYTES # BLD AUTO: 4 % — LOW (ref 13–44)
MANUAL SMEAR VERIFICATION: SIGNIFICANT CHANGE UP
MCHC RBC-ENTMCNC: 28.7 PG — SIGNIFICANT CHANGE UP (ref 27–34)
MCHC RBC-ENTMCNC: 33 GM/DL — SIGNIFICANT CHANGE UP (ref 32–36)
MCV RBC AUTO: 86.8 FL — SIGNIFICANT CHANGE UP (ref 80–100)
METHOD TYPE: SIGNIFICANT CHANGE UP
MONOCYTES # BLD AUTO: 0.49 K/UL — SIGNIFICANT CHANGE UP (ref 0–0.9)
MONOCYTES NFR BLD AUTO: 4 % — SIGNIFICANT CHANGE UP (ref 2–14)
NEUTROPHILS # BLD AUTO: 11.37 K/UL — HIGH (ref 1.8–7.4)
NEUTROPHILS NFR BLD AUTO: 92 % — HIGH (ref 43–77)
NRBC # BLD: 0 /100 — SIGNIFICANT CHANGE UP (ref 0–0)
NRBC # BLD: SIGNIFICANT CHANGE UP /100 WBCS (ref 0–0)
ORGANISM # SPEC MICROSCOPIC CNT: SIGNIFICANT CHANGE UP
PLAT MORPH BLD: NORMAL — SIGNIFICANT CHANGE UP
PLATELET # BLD AUTO: 212 K/UL — SIGNIFICANT CHANGE UP (ref 150–400)
POTASSIUM SERPL-MCNC: 4.2 MMOL/L — SIGNIFICANT CHANGE UP (ref 3.5–5.3)
POTASSIUM SERPL-SCNC: 4.2 MMOL/L — SIGNIFICANT CHANGE UP (ref 3.5–5.3)
RBC # BLD: 3.49 M/UL — LOW (ref 4.2–5.8)
RBC # FLD: 13.7 % — SIGNIFICANT CHANGE UP (ref 10.3–14.5)
RBC BLD AUTO: NORMAL — SIGNIFICANT CHANGE UP
SODIUM SERPL-SCNC: 141 MMOL/L — SIGNIFICANT CHANGE UP (ref 135–145)
SPECIMEN SOURCE: SIGNIFICANT CHANGE UP
WBC # BLD: 12.36 K/UL — HIGH (ref 3.8–10.5)
WBC # FLD AUTO: 12.36 K/UL — HIGH (ref 3.8–10.5)

## 2019-03-13 PROCEDURE — 35501 ART BYP GRFT IPSILAT CAROTID: CPT | Mod: LT,78

## 2019-03-13 PROCEDURE — 35501 ART BYP GRFT IPSILAT CAROTID: CPT | Mod: 80,78,LT

## 2019-03-13 PROCEDURE — 99024 POSTOP FOLLOW-UP VISIT: CPT

## 2019-03-13 RX ORDER — SODIUM CHLORIDE 9 MG/ML
1000 INJECTION INTRAMUSCULAR; INTRAVENOUS; SUBCUTANEOUS ONCE
Qty: 0 | Refills: 0 | Status: COMPLETED | OUTPATIENT
Start: 2019-03-13 | End: 2019-03-13

## 2019-03-13 RX ORDER — HYDROMORPHONE HYDROCHLORIDE 2 MG/ML
1 INJECTION INTRAMUSCULAR; INTRAVENOUS; SUBCUTANEOUS EVERY 4 HOURS
Qty: 0 | Refills: 0 | Status: DISCONTINUED | OUTPATIENT
Start: 2019-03-13 | End: 2019-03-13

## 2019-03-13 RX ORDER — OXYCODONE HYDROCHLORIDE 5 MG/1
5 TABLET ORAL ONCE
Qty: 0 | Refills: 0 | Status: DISCONTINUED | OUTPATIENT
Start: 2019-03-13 | End: 2019-03-13

## 2019-03-13 RX ORDER — PHENYLEPHRINE HYDROCHLORIDE 10 MG/ML
0.5 INJECTION INTRAVENOUS
Qty: 40 | Refills: 0 | Status: DISCONTINUED | OUTPATIENT
Start: 2019-03-13 | End: 2019-03-13

## 2019-03-13 RX ORDER — ONDANSETRON 8 MG/1
4 TABLET, FILM COATED ORAL ONCE
Qty: 0 | Refills: 0 | Status: DISCONTINUED | OUTPATIENT
Start: 2019-03-13 | End: 2019-03-13

## 2019-03-13 RX ORDER — METOPROLOL TARTRATE 50 MG
25 TABLET ORAL DAILY
Qty: 0 | Refills: 0 | Status: DISCONTINUED | OUTPATIENT
Start: 2019-03-13 | End: 2019-03-20

## 2019-03-13 RX ORDER — ACETAMINOPHEN 500 MG
1000 TABLET ORAL EVERY 6 HOURS
Qty: 0 | Refills: 0 | Status: DISCONTINUED | OUTPATIENT
Start: 2019-03-13 | End: 2019-03-20

## 2019-03-13 RX ORDER — HYDROMORPHONE HYDROCHLORIDE 2 MG/ML
0.5 INJECTION INTRAMUSCULAR; INTRAVENOUS; SUBCUTANEOUS
Qty: 0 | Refills: 0 | Status: DISCONTINUED | OUTPATIENT
Start: 2019-03-13 | End: 2019-03-13

## 2019-03-13 RX ORDER — ROSUVASTATIN CALCIUM 5 MG/1
20 TABLET ORAL AT BEDTIME
Qty: 0 | Refills: 0 | Status: DISCONTINUED | OUTPATIENT
Start: 2019-03-13 | End: 2019-03-20

## 2019-03-13 RX ORDER — OXYCODONE HYDROCHLORIDE 5 MG/1
5 TABLET ORAL EVERY 4 HOURS
Qty: 0 | Refills: 0 | Status: DISCONTINUED | OUTPATIENT
Start: 2019-03-13 | End: 2019-03-13

## 2019-03-13 RX ORDER — FENTANYL CITRATE 50 UG/ML
50 INJECTION INTRAVENOUS
Qty: 0 | Refills: 0 | Status: DISCONTINUED | OUTPATIENT
Start: 2019-03-13 | End: 2019-03-13

## 2019-03-13 RX ORDER — DOCUSATE SODIUM 100 MG
100 CAPSULE ORAL DAILY
Qty: 0 | Refills: 0 | Status: DISCONTINUED | OUTPATIENT
Start: 2019-03-13 | End: 2019-03-15

## 2019-03-13 RX ORDER — OXYCODONE HYDROCHLORIDE 5 MG/1
10 TABLET ORAL EVERY 6 HOURS
Qty: 0 | Refills: 0 | Status: DISCONTINUED | OUTPATIENT
Start: 2019-03-13 | End: 2019-03-20

## 2019-03-13 RX ORDER — SODIUM CHLORIDE 9 MG/ML
1000 INJECTION INTRAMUSCULAR; INTRAVENOUS; SUBCUTANEOUS
Qty: 0 | Refills: 0 | Status: DISCONTINUED | OUTPATIENT
Start: 2019-03-13 | End: 2019-03-13

## 2019-03-13 RX ORDER — CLOPIDOGREL BISULFATE 75 MG/1
75 TABLET, FILM COATED ORAL DAILY
Qty: 0 | Refills: 0 | Status: DISCONTINUED | OUTPATIENT
Start: 2019-03-13 | End: 2019-03-14

## 2019-03-13 RX ORDER — ACETAMINOPHEN 500 MG
1000 TABLET ORAL ONCE
Qty: 0 | Refills: 0 | Status: COMPLETED | OUTPATIENT
Start: 2019-03-13 | End: 2019-03-13

## 2019-03-13 RX ORDER — CEFAZOLIN SODIUM 1 G
2000 VIAL (EA) INJECTION EVERY 8 HOURS
Qty: 0 | Refills: 0 | Status: DISCONTINUED | OUTPATIENT
Start: 2019-03-13 | End: 2019-03-20

## 2019-03-13 RX ORDER — MEPERIDINE HYDROCHLORIDE 50 MG/ML
12.5 INJECTION INTRAMUSCULAR; INTRAVENOUS; SUBCUTANEOUS
Qty: 0 | Refills: 0 | Status: DISCONTINUED | OUTPATIENT
Start: 2019-03-13 | End: 2019-03-13

## 2019-03-13 RX ORDER — SODIUM CHLORIDE 9 MG/ML
1000 INJECTION INTRAMUSCULAR; INTRAVENOUS; SUBCUTANEOUS
Qty: 0 | Refills: 0 | Status: DISCONTINUED | OUTPATIENT
Start: 2019-03-13 | End: 2019-03-14

## 2019-03-13 RX ADMIN — OXYCODONE HYDROCHLORIDE 5 MILLIGRAM(S): 5 TABLET ORAL at 13:08

## 2019-03-13 RX ADMIN — Medication 100 MILLIGRAM(S): at 14:59

## 2019-03-13 RX ADMIN — OXYCODONE HYDROCHLORIDE 10 MILLIGRAM(S): 5 TABLET ORAL at 15:29

## 2019-03-13 RX ADMIN — Medication 100 MILLIGRAM(S): at 15:01

## 2019-03-13 RX ADMIN — Medication 1000 MILLIGRAM(S): at 19:48

## 2019-03-13 RX ADMIN — ROSUVASTATIN CALCIUM 20 MILLIGRAM(S): 5 TABLET ORAL at 21:04

## 2019-03-13 RX ADMIN — Medication 400 MILLIGRAM(S): at 02:00

## 2019-03-13 RX ADMIN — Medication 1000 MILLIGRAM(S): at 22:00

## 2019-03-13 RX ADMIN — OXYCODONE HYDROCHLORIDE 10 MILLIGRAM(S): 5 TABLET ORAL at 21:04

## 2019-03-13 RX ADMIN — SODIUM CHLORIDE 75 MILLILITER(S): 9 INJECTION INTRAMUSCULAR; INTRAVENOUS; SUBCUTANEOUS at 18:38

## 2019-03-13 RX ADMIN — OXYCODONE HYDROCHLORIDE 10 MILLIGRAM(S): 5 TABLET ORAL at 14:59

## 2019-03-13 RX ADMIN — Medication 1000 MILLIGRAM(S): at 07:41

## 2019-03-13 RX ADMIN — Medication 100 MILLIGRAM(S): at 21:08

## 2019-03-13 RX ADMIN — OXYCODONE HYDROCHLORIDE 10 MILLIGRAM(S): 5 TABLET ORAL at 22:00

## 2019-03-13 RX ADMIN — OXYCODONE HYDROCHLORIDE 5 MILLIGRAM(S): 5 TABLET ORAL at 12:19

## 2019-03-13 RX ADMIN — OXYCODONE HYDROCHLORIDE 5 MILLIGRAM(S): 5 TABLET ORAL at 11:49

## 2019-03-13 RX ADMIN — OXYCODONE HYDROCHLORIDE 5 MILLIGRAM(S): 5 TABLET ORAL at 12:38

## 2019-03-13 RX ADMIN — SODIUM CHLORIDE 75 MILLILITER(S): 9 INJECTION INTRAMUSCULAR; INTRAVENOUS; SUBCUTANEOUS at 05:12

## 2019-03-13 RX ADMIN — Medication 100 MILLIGRAM(S): at 05:31

## 2019-03-13 RX ADMIN — CLOPIDOGREL BISULFATE 75 MILLIGRAM(S): 75 TABLET, FILM COATED ORAL at 12:53

## 2019-03-13 RX ADMIN — HYDROMORPHONE HYDROCHLORIDE 1 MILLIGRAM(S): 2 INJECTION INTRAMUSCULAR; INTRAVENOUS; SUBCUTANEOUS at 04:40

## 2019-03-13 RX ADMIN — PHENYLEPHRINE HYDROCHLORIDE 13.33 MICROGRAM(S)/KG/MIN: 10 INJECTION INTRAVENOUS at 05:11

## 2019-03-13 RX ADMIN — Medication 1000 MILLIGRAM(S): at 08:11

## 2019-03-13 RX ADMIN — Medication 1000 MILLIGRAM(S): at 02:38

## 2019-03-13 RX ADMIN — HYDROMORPHONE HYDROCHLORIDE 1 MILLIGRAM(S): 2 INJECTION INTRAMUSCULAR; INTRAVENOUS; SUBCUTANEOUS at 04:55

## 2019-03-13 RX ADMIN — SODIUM CHLORIDE 2000 MILLILITER(S): 9 INJECTION INTRAMUSCULAR; INTRAVENOUS; SUBCUTANEOUS at 05:12

## 2019-03-13 NOTE — PROGRESS NOTE ADULT - ASSESSMENT
Infection at  surgical site. He had hematoma and has oozing at surgical site on and off. No further oozing  since admission.  History of TIA and high-grade stenosis and recent carotid artery surgery.  Hypertension.  High cholesterol.  History of smoking.  Suggest  Observe in ICU.  Intravenous antibiotics as per infectious disease service and intensivist.  If he continues to bleed.  Stop Plavix and continue with Ecotrin 81 mg by mouth once daily.  Continue with statins .  Discussed with intensivist  and surgeon .

## 2019-03-13 NOTE — BRIEF OPERATIVE NOTE - NSICDXBRIEFPOSTOP_GEN_ALL_CORE_FT
POST-OP DIAGNOSIS:  S/P carotid endarterectomy 13-Mar-2019 02:06:47  Andry Martins  Hematoma of neck 13-Mar-2019 02:06:31  Andry Martins

## 2019-03-13 NOTE — BRIEF OPERATIVE NOTE - NSICDXBRIEFPROCEDURE_GEN_ALL_CORE_FT
PROCEDURES:  Creation of carotid-carotid artery bypass with vein graft 13-Mar-2019 02:13:58  Andry Martins  Open excision of left external carotid artery 13-Mar-2019 02:08:52  Andry Martins  Open excision of left internal carotid artery 13-Mar-2019 02:08:30  Andry Martins  Exploration of left side of neck 13-Mar-2019 02:07:06  Andry Martins

## 2019-03-13 NOTE — CONSULT NOTE ADULT - SUBJECTIVE AND OBJECTIVE BOX
pt seen in ICU post-exploration of LEFT neck, excision of LEFT internal and external carotid arteries, and transposition bypass graft from saphenous vein.      pt is 62 yo male s/p LEFT CEA approx 2 months ago and re-operation for subsequent infected hematoma.  Admitted to  3/12/19 due to Positive blood cultures (drawn 3/10) demonstrating bacteremia with gram Positive cocci.  Pt also noted episode of bleeding from the surgical site on the evening of 3/11/19 which "soaked" his pillow.     In ICU pt AAOx3 NAD, NRD generally comfortable.    case d/w Anesthesiologist while in OR, and surgical resident post-operatively.    Advanced directives d/w pt in detail - MOLST completed - pt designated his wife as his HCP should he be unable to make decisions.        PAST MEDICAL & SURGICAL HISTORY:  Hematoma  TIA (transient ischemic attack): 11/2018  Stented coronary artery: 12/10/2018         D1  TIA (transient ischemic attack): 11/2018  Carotid stenosis, left  Hyperlipidemia  AAA (Abdominal Aortic Aneurysm): Dx. 5/2009- attempted repair  12/2009 surgery aborted due to hypotension.r  HTN (Hypertension), Benign  ADD (Attention Deficit Disorder)  CABG (Coronary Artery Bypass Graft)x4 6/09  CAD (Coronary Artery Disease)  History of left-sided carotid endarterectomy  S/P AAA repair: 2010  S/P CABG (Coronary Artery Bypass Graft): 5/2009 x 4 vessels  right hernia repair  S/P Tonsillectomy      FAMILY HISTORY:  Family history of myocardial infarction (Grandparent), but otherwise non-contrib to current condition being treated.      Social Hx: , lives at home, No active tobacco use, No sig etoh use.    Allergies    No Known Allergies      Weight (kg): 68 (03-12 @ 10:35)    ICU Vital Signs Last 24 Hrs  T(C): 36.7 (13 Mar 2019 02:37), Max: 36.9 (12 Mar 2019 10:39)  T(F): 98 (13 Mar 2019 02:37), Max: 98.4 (12 Mar 2019 10:39)  HR: 73 (13 Mar 2019 03:00) (61 - 78)  BP: 106/48 (13 Mar 2019 03:00) (96/41 - 122/73)  BP(mean): --  ABP: 107/47 (13 Mar 2019 03:00) (105/51 - 116/49)  ABP(mean): 69 (13 Mar 2019 03:00) (69 - 69)  RR: 18 (13 Mar 2019 03:00) (8 - 18)  SpO2: 100% (13 Mar 2019 03:00) (98% - 100%)          I&O's Summary    12 Mar 2019 07:01  -  13 Mar 2019 03:32  --------------------------------------------------------  IN: 975 mL / OUT: 525 mL / NET: 450 mL                              13.4   7.79  )-----------( 238      ( 12 Mar 2019 11:35 )             40.6       03-12    140  |  109<H>  |  13  ----------------------------<  111<H>  4.5   |  23  |  1.06    Ca    10.3<H>      12 Mar 2019 11:35    TPro  7.9  /  Alb  3.8  /  TBili  0.4  /  DBili  x   /  AST  28  /  ALT  27  /  AlkPhos  97  03-12      LIVER FUNCTIONS - ( 12 Mar 2019 11:35 )  Alb: 3.8 g/dL / Pro: 7.9 gm/dL / ALK PHOS: 97 U/L / ALT: 27 U/L / AST: 28 U/L / GGT: x           PT/INR - ( 12 Mar 2019 11:35 )   PT: 13.3 sec;   INR: 1.19 ratio         PTT - ( 12 Mar 2019 11:35 )  PTT:37.0 sec      MEDICATIONS  (STANDING):  ceFAZolin   IVPB 2000 milliGRAM(s) IV Intermittent every 8 hours  clopidogrel Tablet 75 milliGRAM(s) Oral daily  metoprolol succinate ER 25 milliGRAM(s) Oral daily    MEDICATIONS  (PRN):  acetaminophen   Tablet .. 1000 milliGRAM(s) Oral every 6 hours PRN Temp greater or equal to 38C (100.4F), Mild Pain (1 - 3), Moderate Pain (4 - 6), Severe Pain (7 - 10)          DVT Prophylaxis: Venodynes - NO chemoprophy due to carotid sx and recent bleeding at site.    Advanced Directives: FULL  Discussed with: patient    Visit Information: consult    ** Time is exclusive of billed procedures and/or teaching and/or routine family updates.

## 2019-03-13 NOTE — PROGRESS NOTE ADULT - ASSESSMENT
63F with hx of recent CEA and takeback for subsequent infected hematoma, +ve blood cultures taken on 3/10 which returned gram +cocci.  POD 1 from Left neck exploration, hematoma evacuation and Left Carotid- Carotid Interposition graft with GSV.    - Pain control PRN  - Monitor BP  - Advance diet as tolerated.  - D/C garces catheter  - OOBTC  - Continue plavix, hold Sub Q heparin today  - Follow up labs/cultures - continue ancef   - home meds  - SCD for DVT ppx    discussed w Dr Burciaga

## 2019-03-13 NOTE — BRIEF OPERATIVE NOTE - NSICDXBRIEFPREOP_GEN_ALL_CORE_FT
PRE-OP DIAGNOSIS:  S/P carotid endarterectomy 13-Mar-2019 02:05:52  Andry Martins  Hematoma of neck 13-Mar-2019 02:05:20 infected carotid hematoma Andry Martins

## 2019-03-13 NOTE — CONSULT NOTE ADULT - ASSESSMENT
62 y/o male with a PMHx of AAA, CAD, CABG, left carotid stenosis, HTN, HLD initially presented 3/10 to  ED c/o hematoma and redness to area around L carotid artery, starting 3/9. Pt reports he had carotid artery surgery on 1/11/19 for 80% blockage, done by Dr. Burciaga, was not taken off Plavix during the surgery. Pt subsequently developed a hematoma that frequently became infected.  Recently admitted to [facility] for infection, put on IV abx and d/c on PO abx. Finished course 1 week ago. Pt returned 3/12 d/t positive blood cx with MSSA, had CT neck 3/10 showing decreasing size of L carotid hematoma, was eval by vascular and given IV vanco/zosyn.     1. sepsis with MSSA secondary to infected L carotid hematoma  - s/p vanco/zosyn, switch to ancef 2gmq8h  - f/u final cx/sensitivities   - TTE  - vascular f/u  - monitor temps  - tolerating abx well so far; no side effects noted  - reason for abx use and side effects reviewed with patient  - supportive care  - fu cbc    2. other issues -care per medicine
64 yo M POD#0 following LEFT carotid exploration due to infected hematoma from recent CEA and (+) bacteremia with Gram (+) Cocci.  s/p excision of LEFT internal and external carotid arteries with transposition bypass graft from Saphenous vein.    CAD with stent 12/2018 - on plavix and asa  TIA (transient ischemic attack): 11/2018  Hyperlipidemia  AAA (Abdominal Aortic Aneurysm): Dx. 5/2009- attempted repair  12/2009 surgery aborted due to hypotension.   HTN (Hypertension), Benign  CABG (Coronary Artery Bypass Graft)x4 6/09  S/P AAA repair: 2010  S/P CABG (Coronary Artery Bypass Graft): 5/2009 x 4 vessels    hemodynamics and respiratory function reasonable at present  NO new neurologic deficits, NO evidence of hematoma or airway compromise      Plan for continued care in ICU  HOB 30  Monitor airway, surgical site, neurologic status  continue plavix in AM  Hold ASA until further review by surgical team  NPO except meds  IVF  continue ancef 2gm IV q8h  analgesia as needed  continue metoprolol and statin as appropriate  supportive care    above d/w pt and all questions answered.

## 2019-03-13 NOTE — PROGRESS NOTE ADULT - ASSESSMENT
64 y/o male with a PMHx of AAA, CAD, CABG, left carotid stenosis, HTN, HLD initially presented 3/10 to  ED c/o hematoma and redness to area around L carotid artery, starting 3/9. Pt reports he had carotid artery surgery on 1/11/19 for 80% blockage, done by Dr. Burciaga, was not taken off Plavix during the surgery. Pt subsequently developed a hematoma that frequently became infected.  Recently admitted to [facility] for infection, put on IV abx and d/c on PO abx. Finished course 1 week ago. Pt returned 3/12 d/t positive blood cx with MSSA, had CT neck 3/10 showing decreasing size of L carotid hematoma, was eval by vascular and given IV vanco/zosyn.     1. sepsis with MSSA secondary to infected L carotid hematoma  - s/p Left neck exploration, hematoma evacuation & graft placement 3/12  - on ancef 2gmq8h #2, continue with abx coverage   - f/u final cx/sensitivities   - TTE  - vascular f/u noted  - monitor temps  - tolerating abx well so far; no side effects noted  - reason for abx use and side effects reviewed with patient  - supportive care  - fu cbc    2. other issues -care per medicine

## 2019-03-14 LAB
ANION GAP SERPL CALC-SCNC: 6 MMOL/L — SIGNIFICANT CHANGE UP (ref 5–17)
BUN SERPL-MCNC: 6 MG/DL — LOW (ref 7–23)
CALCIUM SERPL-MCNC: 9.2 MG/DL — SIGNIFICANT CHANGE UP (ref 8.5–10.1)
CHLORIDE SERPL-SCNC: 113 MMOL/L — HIGH (ref 96–108)
CO2 SERPL-SCNC: 25 MMOL/L — SIGNIFICANT CHANGE UP (ref 22–31)
CREAT SERPL-MCNC: 0.65 MG/DL — SIGNIFICANT CHANGE UP (ref 0.5–1.3)
CULTURE RESULTS: SIGNIFICANT CHANGE UP
CULTURE RESULTS: SIGNIFICANT CHANGE UP
GLUCOSE SERPL-MCNC: 126 MG/DL — HIGH (ref 70–99)
HCT VFR BLD CALC: 29 % — LOW (ref 39–50)
HGB BLD-MCNC: 9.4 G/DL — LOW (ref 13–17)
MAGNESIUM SERPL-MCNC: 2 MG/DL — SIGNIFICANT CHANGE UP (ref 1.6–2.6)
MCHC RBC-ENTMCNC: 28.1 PG — SIGNIFICANT CHANGE UP (ref 27–34)
MCHC RBC-ENTMCNC: 32.4 GM/DL — SIGNIFICANT CHANGE UP (ref 32–36)
MCV RBC AUTO: 86.6 FL — SIGNIFICANT CHANGE UP (ref 80–100)
NRBC # BLD: 0 /100 WBCS — SIGNIFICANT CHANGE UP (ref 0–0)
PHOSPHATE SERPL-MCNC: 1.7 MG/DL — LOW (ref 2.5–4.5)
PLATELET # BLD AUTO: 220 K/UL — SIGNIFICANT CHANGE UP (ref 150–400)
POTASSIUM SERPL-MCNC: 3.8 MMOL/L — SIGNIFICANT CHANGE UP (ref 3.5–5.3)
POTASSIUM SERPL-SCNC: 3.8 MMOL/L — SIGNIFICANT CHANGE UP (ref 3.5–5.3)
RBC # BLD: 3.35 M/UL — LOW (ref 4.2–5.8)
RBC # FLD: 13.8 % — SIGNIFICANT CHANGE UP (ref 10.3–14.5)
SODIUM SERPL-SCNC: 144 MMOL/L — SIGNIFICANT CHANGE UP (ref 135–145)
SPECIMEN SOURCE: SIGNIFICANT CHANGE UP
SPECIMEN SOURCE: SIGNIFICANT CHANGE UP
WBC # BLD: 8.16 K/UL — SIGNIFICANT CHANGE UP (ref 3.8–10.5)
WBC # FLD AUTO: 8.16 K/UL — SIGNIFICANT CHANGE UP (ref 3.8–10.5)

## 2019-03-14 RX ORDER — SODIUM,POTASSIUM PHOSPHATES 278-250MG
2 POWDER IN PACKET (EA) ORAL
Qty: 0 | Refills: 0 | Status: COMPLETED | OUTPATIENT
Start: 2019-03-14 | End: 2019-03-16

## 2019-03-14 RX ORDER — ASPIRIN/CALCIUM CARB/MAGNESIUM 324 MG
81 TABLET ORAL DAILY
Qty: 0 | Refills: 0 | Status: DISCONTINUED | OUTPATIENT
Start: 2019-03-14 | End: 2019-03-20

## 2019-03-14 RX ORDER — VANCOMYCIN HCL 1 G
VIAL (EA) INTRAVENOUS
Qty: 0 | Refills: 0 | Status: DISCONTINUED | OUTPATIENT
Start: 2019-03-14 | End: 2019-03-14

## 2019-03-14 RX ORDER — VANCOMYCIN HCL 1 G
1000 VIAL (EA) INTRAVENOUS ONCE
Qty: 0 | Refills: 0 | Status: COMPLETED | OUTPATIENT
Start: 2019-03-14 | End: 2019-03-14

## 2019-03-14 RX ADMIN — Medication 2 PACKET(S): at 18:03

## 2019-03-14 RX ADMIN — Medication 25 MILLIGRAM(S): at 05:03

## 2019-03-14 RX ADMIN — OXYCODONE HYDROCHLORIDE 10 MILLIGRAM(S): 5 TABLET ORAL at 15:41

## 2019-03-14 RX ADMIN — Medication 100 MILLIGRAM(S): at 14:36

## 2019-03-14 RX ADMIN — OXYCODONE HYDROCHLORIDE 10 MILLIGRAM(S): 5 TABLET ORAL at 04:40

## 2019-03-14 RX ADMIN — Medication 100 MILLIGRAM(S): at 21:38

## 2019-03-14 RX ADMIN — Medication 100 MILLIGRAM(S): at 05:02

## 2019-03-14 RX ADMIN — OXYCODONE HYDROCHLORIDE 10 MILLIGRAM(S): 5 TABLET ORAL at 09:44

## 2019-03-14 RX ADMIN — OXYCODONE HYDROCHLORIDE 10 MILLIGRAM(S): 5 TABLET ORAL at 22:08

## 2019-03-14 RX ADMIN — Medication 1000 MILLIGRAM(S): at 09:29

## 2019-03-14 RX ADMIN — OXYCODONE HYDROCHLORIDE 10 MILLIGRAM(S): 5 TABLET ORAL at 10:14

## 2019-03-14 RX ADMIN — ROSUVASTATIN CALCIUM 20 MILLIGRAM(S): 5 TABLET ORAL at 21:38

## 2019-03-14 RX ADMIN — CLOPIDOGREL BISULFATE 75 MILLIGRAM(S): 75 TABLET, FILM COATED ORAL at 12:57

## 2019-03-14 RX ADMIN — Medication 1000 MILLIGRAM(S): at 08:59

## 2019-03-14 RX ADMIN — OXYCODONE HYDROCHLORIDE 10 MILLIGRAM(S): 5 TABLET ORAL at 21:38

## 2019-03-14 RX ADMIN — Medication 250 MILLIGRAM(S): at 10:21

## 2019-03-14 RX ADMIN — Medication 100 MILLIGRAM(S): at 12:57

## 2019-03-14 RX ADMIN — OXYCODONE HYDROCHLORIDE 10 MILLIGRAM(S): 5 TABLET ORAL at 03:50

## 2019-03-14 NOTE — PROGRESS NOTE ADULT - ATTENDING COMMENTS
POD 2 s/p left carotid exploration, removal of infected patch and interposition CC-IC bypass  Now defervesced  Feels well  No neurological sequelae  Left neck wound clean, no purulence  Blood cultures- MSSA    For TTE today, may require GABRIELA if blood cultures persistently positive  Continue Ancef per ID.  Will continue holding plavix for now

## 2019-03-14 NOTE — PROGRESS NOTE ADULT - ASSESSMENT
Infection at  surgical site.  No further oozing  since admission. As per surgeon wheezing from surgical site was because of infection.  History of TIA and high-grade stenosis and recent carotid artery surgery.  Hypertension.  High cholesterol.  History of smoking.  Suggest  Observe in ICU.  Intravenous antibiotics as per infectious disease service and intensivist.  If he continues bleed.  Stop Plavix and continue with Ecotrin 81 mg by mouth once daily.  Continue with statins .  Discussed with intensivist  and surgeon .  I shall be away tomorrow and Dr. Mckay will follow-up.

## 2019-03-14 NOTE — PROGRESS NOTE ADULT - ASSESSMENT
AP    Pt is a 63M  with hx of recent CEA and takeback for subsequent infected hematoma, +ve blood cultures taken on 3/10 which returned gram +cocci.  POD 2 from Left neck exploration, hematoma evacuation and Left Carotid- Carotid Interposition graft with GSV.      - Monitor vitals  - Diet: dash  - Pain control PRN  - Continue plavix  - Monitor surgical site for s/s of bleeding/hematoma formation   - Follow up labs/cultures  - FU ID recs  - FU Cardiology recs   - home meds  - Continue primary care Per ICU team   - SCD for DVT ppx    Will discuss plan with vascular surgery attending, Dr. Burciaga

## 2019-03-14 NOTE — PROGRESS NOTE ADULT - ASSESSMENT
64 yo M POD#1 following LEFT carotid exploration due to infected hematoma from recent CEA and (+) bacteremia with Gram (+) Cocci.  s/p excision of LEFT internal and external carotid arteries with transposition bypass graft from Saphenous vein.    CAD with stent 12/2018 - on plavix and asa  TIA (transient ischemic attack): 11/2018  Hyperlipidemia  AAA (Abdominal Aortic Aneurysm): Dx. 5/2009- attempted repair  12/2009 surgery aborted due to hypotension.   HTN (Hypertension), Benign  CABG (Coronary Artery Bypass Graft)x4 6/09  S/P AAA repair: 2010  S/P CABG (Coronary Artery Bypass Graft): 5/2009 x 4 vessels    hemodynamics and respiratory function reasonable at present  NO new neurologic deficits, NO evidence of hematoma or airway compromise      Plan:  HOB 30    continue plavix   Hold ASA until further rview by surgical team  PO  D/C IVF  continue ancef 2gm IV q8h  analgesia as needed  continue metoprolol and statin as appropriate  supportive care    Transfer to a reg floor 64 yo M POD#1 following LEFT carotid exploration due to infected hematoma from recent CEA and (+) bacteremia with Gram (+) Cocci.  s/p excision of LEFT internal and external carotid arteries with transposition bypass graft from Saphenous vein.    CAD with stent 12/2018 - on plavix and asa  TIA (transient ischemic attack): 11/2018  Hyperlipidemia  AAA (Abdominal Aortic Aneurysm): Dx. 5/2009- attempted repair  12/2009 surgery aborted due to hypotension.   HTN (Hypertension), Benign  CABG (Coronary Artery Bypass Graft)x4 6/09  S/P AAA repair: 2010  S/P CABG (Coronary Artery Bypass Graft): 5/2009 x 4 vessels    hemodynamics and respiratory function reasonable at present  NO new neurologic deficits, NO evidence of hematoma or airway compromise      Plan:  HOB 30  Patient not septic  continue plavix   Hold ASA until further rview by surgical team  PO  D/C IVF  continue ancef 2gm IV q8h  analgesia as needed  continue metoprolol and statin as appropriate  supportive care    Transfer to a reg floor

## 2019-03-14 NOTE — PROGRESS NOTE ADULT - RESPIRATORY
How Severe Is Your Skin Lesion?: mild
Have Your Skin Lesions Been Treated?: not been treated
Is This A New Presentation, Or A Follow-Up?: Skin Lesions
Additional History: Family hx of neurofibromatosis
detailed exam

## 2019-03-14 NOTE — CDI QUERY NOTE - NSCDIOTHERTXTBX_GEN_ALL_CORE_HH
Documentation on chart of  LEFT carotid exploration due to infected hematoma from recent CEA ( infected left carotid artery graft)  as well as (+) bacteremia with Gram (+) Cocci. ( Left carotid arterty surgery one month PTA)     ID documented: sepsis with MSSA secondary to infected L carotid hematoma    RR: 23- BP 75/38 - WBC 12    Antibiotics: Cefazolin - Vancomycin - Piperacillin    Medications:     phenylephrine    Infusion   13.331 mL/Hr IV Continuous (03-13-19 @ 04:51)    sodium chloride 0.9% Bolus   2040 mL/Hr IV Bolus (03-12-19 @ 12:20)    sodium chloride 0.9% Bolus   2000 mL/Hr IV Bolus (03-13-19 @ 05:12)    Blood cultures:    Culture - Blood (collected 03-12-19 @ 11:35)  Source: .Blood Blood  Gram Stain (03-13-19 @ 13:53):    Growth in aerobic bottle: Gram Positive Cocci in Clusters    Growth in anaerobic bottle: Gram Positive Cocci in Clusters      Culture - Blood (collected 03-12-19 @ 11:35)  Source: .Blood Blood  Gram Stain (03-13-19 @ 07:40):    Growth in aerobic and anaerobic bottles: Gram Positive Cocci in Clusters    Culture - Blood (collected 03-10-19 @ 16:48)  Source: .Blood None  Gram Stain (03-12-19 @ 02:43):    Growth in aerobic bottle: Gram Positive Cocci in Clusters  Final Report (03-13-19 @ 15:25):    Growth in aerobic bottle: Staphylococcus aureus      Please document the relationship between the following conditions:    A) Sepsis is due to/related to infected hematoma from recent CEA  B) Sepsis is not due to/not related to infected hematoma from recent CEA  C) No indications of Sepsis  D) Unknown  E) Other ( Please specify condition)

## 2019-03-14 NOTE — PROGRESS NOTE ADULT - ASSESSMENT
64 y/o male with a PMHx of AAA, CAD, CABG, left carotid stenosis, HTN, HLD initially presented 3/10 to  ED c/o hematoma and redness to area around L carotid artery, starting 3/9. Pt reports he had carotid artery surgery on 1/11/19 for 80% blockage, done by Dr. Burciaga, was not taken off Plavix during the surgery. Pt subsequently developed a hematoma that frequently became infected.  Recently admitted to [place] for infection, put on IV abx and d/c on PO abx. Finished course 1 week ago. Pt returned 3/12 d/t positive blood cx with MSSA, had CT neck 3/10 showing decreasing size of L carotid hematoma, was eval by vascular and given IV vanco/zosyn.     1. sepsis with MSSA secondary to infected L carotid hematoma  - s/p Left neck exploration, hematoma evacuation & graft placement 3/12  - improving   - on ancef 2gmq8h #3, continue with abx coverage   - sensitivities reviewed, cx are MSSA not MRSA no further vanco needed  - f/u TTE  - vascular f/u noted  - will require longer term IV abx coverage   - monitor temps  - tolerating abx well so far; no side effects noted  - reason for abx use and side effects reviewed with patient  - supportive care  - fu cbc    2. other issues -care per medicine

## 2019-03-15 LAB
ANION GAP SERPL CALC-SCNC: 7 MMOL/L — SIGNIFICANT CHANGE UP (ref 5–17)
BUN SERPL-MCNC: 5 MG/DL — LOW (ref 7–23)
CALCIUM SERPL-MCNC: 9.5 MG/DL — SIGNIFICANT CHANGE UP (ref 8.5–10.1)
CHLORIDE SERPL-SCNC: 109 MMOL/L — HIGH (ref 96–108)
CO2 SERPL-SCNC: 27 MMOL/L — SIGNIFICANT CHANGE UP (ref 22–31)
CREAT SERPL-MCNC: 0.69 MG/DL — SIGNIFICANT CHANGE UP (ref 0.5–1.3)
GLUCOSE SERPL-MCNC: 114 MG/DL — HIGH (ref 70–99)
HCT VFR BLD CALC: 27.9 % — LOW (ref 39–50)
HGB BLD-MCNC: 9.1 G/DL — LOW (ref 13–17)
MAGNESIUM SERPL-MCNC: 2.1 MG/DL — SIGNIFICANT CHANGE UP (ref 1.6–2.6)
MCHC RBC-ENTMCNC: 28.3 PG — SIGNIFICANT CHANGE UP (ref 27–34)
MCHC RBC-ENTMCNC: 32.6 GM/DL — SIGNIFICANT CHANGE UP (ref 32–36)
MCV RBC AUTO: 86.9 FL — SIGNIFICANT CHANGE UP (ref 80–100)
NRBC # BLD: 0 /100 WBCS — SIGNIFICANT CHANGE UP (ref 0–0)
PHOSPHATE SERPL-MCNC: 2.6 MG/DL — SIGNIFICANT CHANGE UP (ref 2.5–4.5)
PLATELET # BLD AUTO: 232 K/UL — SIGNIFICANT CHANGE UP (ref 150–400)
POTASSIUM SERPL-MCNC: 4 MMOL/L — SIGNIFICANT CHANGE UP (ref 3.5–5.3)
POTASSIUM SERPL-SCNC: 4 MMOL/L — SIGNIFICANT CHANGE UP (ref 3.5–5.3)
RBC # BLD: 3.21 M/UL — LOW (ref 4.2–5.8)
RBC # FLD: 13.8 % — SIGNIFICANT CHANGE UP (ref 10.3–14.5)
SODIUM SERPL-SCNC: 143 MMOL/L — SIGNIFICANT CHANGE UP (ref 135–145)
WBC # BLD: 7.89 K/UL — SIGNIFICANT CHANGE UP (ref 3.8–10.5)
WBC # FLD AUTO: 7.89 K/UL — SIGNIFICANT CHANGE UP (ref 3.8–10.5)

## 2019-03-15 RX ORDER — DOCUSATE SODIUM 100 MG
100 CAPSULE ORAL THREE TIMES A DAY
Qty: 0 | Refills: 0 | Status: DISCONTINUED | OUTPATIENT
Start: 2019-03-15 | End: 2019-03-20

## 2019-03-15 RX ADMIN — Medication 100 MILLIGRAM(S): at 15:03

## 2019-03-15 RX ADMIN — Medication 81 MILLIGRAM(S): at 12:25

## 2019-03-15 RX ADMIN — OXYCODONE HYDROCHLORIDE 10 MILLIGRAM(S): 5 TABLET ORAL at 03:58

## 2019-03-15 RX ADMIN — Medication 100 MILLIGRAM(S): at 21:10

## 2019-03-15 RX ADMIN — Medication 100 MILLIGRAM(S): at 06:45

## 2019-03-15 RX ADMIN — Medication 25 MILLIGRAM(S): at 06:45

## 2019-03-15 RX ADMIN — Medication 100 MILLIGRAM(S): at 15:02

## 2019-03-15 RX ADMIN — OXYCODONE HYDROCHLORIDE 10 MILLIGRAM(S): 5 TABLET ORAL at 22:43

## 2019-03-15 RX ADMIN — ROSUVASTATIN CALCIUM 20 MILLIGRAM(S): 5 TABLET ORAL at 21:10

## 2019-03-15 RX ADMIN — OXYCODONE HYDROCHLORIDE 10 MILLIGRAM(S): 5 TABLET ORAL at 16:06

## 2019-03-15 RX ADMIN — Medication 1000 MILLIGRAM(S): at 18:26

## 2019-03-15 RX ADMIN — Medication 2 PACKET(S): at 18:26

## 2019-03-15 RX ADMIN — Medication 2 PACKET(S): at 06:45

## 2019-03-15 RX ADMIN — OXYCODONE HYDROCHLORIDE 10 MILLIGRAM(S): 5 TABLET ORAL at 22:13

## 2019-03-15 RX ADMIN — OXYCODONE HYDROCHLORIDE 10 MILLIGRAM(S): 5 TABLET ORAL at 09:52

## 2019-03-15 RX ADMIN — Medication 100 MILLIGRAM(S): at 21:09

## 2019-03-15 NOTE — PROGRESS NOTE ADULT - ASSESSMENT
62 y/o male with a PMHx of AAA, CAD, CABG, left carotid stenosis, HTN, HLD initially presented 3/10 to  ED c/o hematoma and redness to area around L carotid artery, starting 3/9. Pt reports he had carotid artery surgery on 1/11/19 for 80% blockage, done by Dr. Burciaga, was not taken off Plavix during the surgery. Pt subsequently developed a hematoma that frequently became infected.  Recently admitted to [place] for infection, put on IV abx and d/c on PO abx. Finished course 1 week ago. Pt returned 3/12 d/t positive blood cx with MSSA, had CT neck 3/10 showing decreasing size of L carotid hematoma, was eval by vascular and given IV vanco/zosyn.     1. sepsis with MSSA secondary to infected L carotid hematoma  - s/p Left neck exploration, hematoma evacuation & graft placement 3/12  - improving   - on ancef 2gmq8h #4, continue with abx coverage   - sensitivities reviewed, cx are MSSA not MRSA no further vanco needed  - TTE no obvious vegetations, plan for GABRIELA per cardiology  - vascular f/u noted  - f/u blood cx 3/14  - will require longer term IV abx coverage   - monitor temps  - tolerating abx well so far; no side effects noted  - reason for abx use and side effects reviewed with patient  - supportive care  - fu cbc    2. other issues -care per medicine

## 2019-03-15 NOTE — PROGRESS NOTE ADULT - ASSESSMENT
63F with hx of recent CEA and takeback for subsequent infected hematoma, +ve blood cultures taken on 3/10 which returned gram +cocci.  POD 2 from Left neck exploration, hematoma evacuation and Left Carotid- Carotid Interposition graft with GSV.    - Pain control PRN  - Monitor BP  - Advance diet as tolerated.  - Continue local wound care  - OOBTC  - Continue plavix  - Follow up labs/cultures - continue ancef for MSSA - Cardiology/ID - possible GABRIELA monday  - home meds  - SCD for DVT ppx    discussed w Dr Burciaga

## 2019-03-15 NOTE — PROGRESS NOTE ADULT - ATTENDING COMMENTS
Remains defervesced  Feels well  No neurological sequelae  Left neck wound clean, no purulence  Blood cultures- MSSA    For TTE today, may require GABRIELA if blood cultures persistently positive  Continue Ancef per ID.  Will continue holding plavix for now

## 2019-03-16 LAB
ANION GAP SERPL CALC-SCNC: 8 MMOL/L — SIGNIFICANT CHANGE UP (ref 5–17)
BUN SERPL-MCNC: 7 MG/DL — SIGNIFICANT CHANGE UP (ref 7–23)
CALCIUM SERPL-MCNC: 10 MG/DL — SIGNIFICANT CHANGE UP (ref 8.5–10.1)
CHLORIDE SERPL-SCNC: 110 MMOL/L — HIGH (ref 96–108)
CO2 SERPL-SCNC: 25 MMOL/L — SIGNIFICANT CHANGE UP (ref 22–31)
CREAT SERPL-MCNC: 0.71 MG/DL — SIGNIFICANT CHANGE UP (ref 0.5–1.3)
CULTURE RESULTS: SIGNIFICANT CHANGE UP
GLUCOSE SERPL-MCNC: 140 MG/DL — HIGH (ref 70–99)
HCT VFR BLD CALC: 30.2 % — LOW (ref 39–50)
HGB BLD-MCNC: 9.9 G/DL — LOW (ref 13–17)
MAGNESIUM SERPL-MCNC: 2 MG/DL — SIGNIFICANT CHANGE UP (ref 1.6–2.6)
MCHC RBC-ENTMCNC: 27.7 PG — SIGNIFICANT CHANGE UP (ref 27–34)
MCHC RBC-ENTMCNC: 32.8 GM/DL — SIGNIFICANT CHANGE UP (ref 32–36)
MCV RBC AUTO: 84.6 FL — SIGNIFICANT CHANGE UP (ref 80–100)
NRBC # BLD: 0 /100 WBCS — SIGNIFICANT CHANGE UP (ref 0–0)
PHOSPHATE SERPL-MCNC: 2.9 MG/DL — SIGNIFICANT CHANGE UP (ref 2.5–4.5)
PLATELET # BLD AUTO: 292 K/UL — SIGNIFICANT CHANGE UP (ref 150–400)
POTASSIUM SERPL-MCNC: 4 MMOL/L — SIGNIFICANT CHANGE UP (ref 3.5–5.3)
POTASSIUM SERPL-SCNC: 4 MMOL/L — SIGNIFICANT CHANGE UP (ref 3.5–5.3)
RBC # BLD: 3.57 M/UL — LOW (ref 4.2–5.8)
RBC # FLD: 13.4 % — SIGNIFICANT CHANGE UP (ref 10.3–14.5)
SODIUM SERPL-SCNC: 143 MMOL/L — SIGNIFICANT CHANGE UP (ref 135–145)
SPECIMEN SOURCE: SIGNIFICANT CHANGE UP
WBC # BLD: 6.99 K/UL — SIGNIFICANT CHANGE UP (ref 3.8–10.5)
WBC # FLD AUTO: 6.99 K/UL — SIGNIFICANT CHANGE UP (ref 3.8–10.5)

## 2019-03-16 RX ADMIN — Medication 100 MILLIGRAM(S): at 04:26

## 2019-03-16 RX ADMIN — OXYCODONE HYDROCHLORIDE 10 MILLIGRAM(S): 5 TABLET ORAL at 17:39

## 2019-03-16 RX ADMIN — Medication 25 MILLIGRAM(S): at 04:25

## 2019-03-16 RX ADMIN — OXYCODONE HYDROCHLORIDE 10 MILLIGRAM(S): 5 TABLET ORAL at 04:56

## 2019-03-16 RX ADMIN — Medication 1000 MILLIGRAM(S): at 17:35

## 2019-03-16 RX ADMIN — ROSUVASTATIN CALCIUM 20 MILLIGRAM(S): 5 TABLET ORAL at 22:11

## 2019-03-16 RX ADMIN — Medication 81 MILLIGRAM(S): at 10:05

## 2019-03-16 RX ADMIN — Medication 100 MILLIGRAM(S): at 22:11

## 2019-03-16 RX ADMIN — OXYCODONE HYDROCHLORIDE 10 MILLIGRAM(S): 5 TABLET ORAL at 17:45

## 2019-03-16 RX ADMIN — OXYCODONE HYDROCHLORIDE 10 MILLIGRAM(S): 5 TABLET ORAL at 16:12

## 2019-03-16 RX ADMIN — Medication 100 MILLIGRAM(S): at 04:25

## 2019-03-16 RX ADMIN — Medication 1000 MILLIGRAM(S): at 17:39

## 2019-03-16 RX ADMIN — OXYCODONE HYDROCHLORIDE 10 MILLIGRAM(S): 5 TABLET ORAL at 22:11

## 2019-03-16 RX ADMIN — Medication 100 MILLIGRAM(S): at 12:58

## 2019-03-16 RX ADMIN — OXYCODONE HYDROCHLORIDE 10 MILLIGRAM(S): 5 TABLET ORAL at 10:05

## 2019-03-16 RX ADMIN — OXYCODONE HYDROCHLORIDE 10 MILLIGRAM(S): 5 TABLET ORAL at 04:26

## 2019-03-16 RX ADMIN — Medication 100 MILLIGRAM(S): at 12:50

## 2019-03-16 RX ADMIN — Medication 2 PACKET(S): at 04:25

## 2019-03-16 NOTE — PROGRESS NOTE ADULT - ASSESSMENT
64 yo male s/p evacuation of left infected carotid hematoma, with removal of graft and interposition carotid graft with rgsv  POD#4    - Doing well, afebrile, repeat cultures prelim negative, redrawn today  - abx per ID - ancef 2g q8  - TTE yesterday negative - possible GABRIELA monday per cardiology   - plavix held for now - on asa/statin  - left neck wound healing well - no sign of infection, bleeding, or hematoma  - groin wound dressing changed - cdi  - am labs pending, monitor blood cx  - discussed w Dr Burciaga

## 2019-03-17 LAB
ANION GAP SERPL CALC-SCNC: 4 MMOL/L — LOW (ref 5–17)
BUN SERPL-MCNC: 9 MG/DL — SIGNIFICANT CHANGE UP (ref 7–23)
CALCIUM SERPL-MCNC: 9.3 MG/DL — SIGNIFICANT CHANGE UP (ref 8.5–10.1)
CHLORIDE SERPL-SCNC: 108 MMOL/L — SIGNIFICANT CHANGE UP (ref 96–108)
CO2 SERPL-SCNC: 29 MMOL/L — SIGNIFICANT CHANGE UP (ref 22–31)
CREAT SERPL-MCNC: 0.76 MG/DL — SIGNIFICANT CHANGE UP (ref 0.5–1.3)
GLUCOSE SERPL-MCNC: 107 MG/DL — HIGH (ref 70–99)
HCT VFR BLD CALC: 27.3 % — LOW (ref 39–50)
HGB BLD-MCNC: 8.7 G/DL — LOW (ref 13–17)
MCHC RBC-ENTMCNC: 27.5 PG — SIGNIFICANT CHANGE UP (ref 27–34)
MCHC RBC-ENTMCNC: 31.9 GM/DL — LOW (ref 32–36)
MCV RBC AUTO: 86.4 FL — SIGNIFICANT CHANGE UP (ref 80–100)
NRBC # BLD: 0 /100 WBCS — SIGNIFICANT CHANGE UP (ref 0–0)
PLATELET # BLD AUTO: 285 K/UL — SIGNIFICANT CHANGE UP (ref 150–400)
POTASSIUM SERPL-MCNC: 4.2 MMOL/L — SIGNIFICANT CHANGE UP (ref 3.5–5.3)
POTASSIUM SERPL-SCNC: 4.2 MMOL/L — SIGNIFICANT CHANGE UP (ref 3.5–5.3)
RBC # BLD: 3.16 M/UL — LOW (ref 4.2–5.8)
RBC # FLD: 13.4 % — SIGNIFICANT CHANGE UP (ref 10.3–14.5)
SODIUM SERPL-SCNC: 141 MMOL/L — SIGNIFICANT CHANGE UP (ref 135–145)
WBC # BLD: 8.72 K/UL — SIGNIFICANT CHANGE UP (ref 3.8–10.5)
WBC # FLD AUTO: 8.72 K/UL — SIGNIFICANT CHANGE UP (ref 3.8–10.5)

## 2019-03-17 RX ORDER — OXYCODONE HYDROCHLORIDE 5 MG/1
10 TABLET ORAL ONCE
Qty: 0 | Refills: 0 | Status: COMPLETED | OUTPATIENT
Start: 2019-03-17 | End: 2019-03-17

## 2019-03-17 RX ADMIN — ROSUVASTATIN CALCIUM 20 MILLIGRAM(S): 5 TABLET ORAL at 21:21

## 2019-03-17 RX ADMIN — OXYCODONE HYDROCHLORIDE 10 MILLIGRAM(S): 5 TABLET ORAL at 22:09

## 2019-03-17 RX ADMIN — Medication 100 MILLIGRAM(S): at 05:28

## 2019-03-17 RX ADMIN — Medication 100 MILLIGRAM(S): at 13:25

## 2019-03-17 RX ADMIN — Medication 1000 MILLIGRAM(S): at 17:31

## 2019-03-17 RX ADMIN — Medication 1000 MILLIGRAM(S): at 16:41

## 2019-03-17 RX ADMIN — OXYCODONE HYDROCHLORIDE 10 MILLIGRAM(S): 5 TABLET ORAL at 15:50

## 2019-03-17 RX ADMIN — OXYCODONE HYDROCHLORIDE 10 MILLIGRAM(S): 5 TABLET ORAL at 16:43

## 2019-03-17 RX ADMIN — Medication 100 MILLIGRAM(S): at 21:21

## 2019-03-17 RX ADMIN — Medication 81 MILLIGRAM(S): at 13:25

## 2019-03-17 RX ADMIN — OXYCODONE HYDROCHLORIDE 10 MILLIGRAM(S): 5 TABLET ORAL at 10:06

## 2019-03-17 RX ADMIN — OXYCODONE HYDROCHLORIDE 10 MILLIGRAM(S): 5 TABLET ORAL at 10:30

## 2019-03-17 RX ADMIN — Medication 100 MILLIGRAM(S): at 05:29

## 2019-03-17 RX ADMIN — Medication 25 MILLIGRAM(S): at 05:29

## 2019-03-17 RX ADMIN — Medication 1000 MILLIGRAM(S): at 09:36

## 2019-03-17 RX ADMIN — Medication 100 MILLIGRAM(S): at 15:15

## 2019-03-17 RX ADMIN — OXYCODONE HYDROCHLORIDE 10 MILLIGRAM(S): 5 TABLET ORAL at 10:01

## 2019-03-17 RX ADMIN — Medication 1000 MILLIGRAM(S): at 10:01

## 2019-03-17 RX ADMIN — OXYCODONE HYDROCHLORIDE 10 MILLIGRAM(S): 5 TABLET ORAL at 05:28

## 2019-03-17 NOTE — PROGRESS NOTE ADULT - ASSESSMENT
64 yo male s/p evacuation of left infected carotid hematoma, with removal of graft and interposition carotid graft with rgsv  POD #5    - Doing well, afebrile, repeat cultures prelim negative, redrawn yesterday pending  - abx per ID - ancef 2g q8  - TTE negative - possible GABRIELA monday per cardiology - npo at [place]  - plavix held for now - on asa/statin  - left neck wound healing well - no sign of infection, bleeding, or hematoma  - groin wound dressing changed - cdi  - labs reviewed, monitor blood cx  - will discuss w Dr Burciaga

## 2019-03-18 RX ADMIN — Medication 100 MILLIGRAM(S): at 21:10

## 2019-03-18 RX ADMIN — Medication 100 MILLIGRAM(S): at 13:32

## 2019-03-18 RX ADMIN — Medication 81 MILLIGRAM(S): at 13:30

## 2019-03-18 RX ADMIN — Medication 100 MILLIGRAM(S): at 13:30

## 2019-03-18 RX ADMIN — OXYCODONE HYDROCHLORIDE 10 MILLIGRAM(S): 5 TABLET ORAL at 22:48

## 2019-03-18 RX ADMIN — ROSUVASTATIN CALCIUM 20 MILLIGRAM(S): 5 TABLET ORAL at 21:10

## 2019-03-18 RX ADMIN — Medication 100 MILLIGRAM(S): at 05:41

## 2019-03-18 RX ADMIN — OXYCODONE HYDROCHLORIDE 10 MILLIGRAM(S): 5 TABLET ORAL at 16:30

## 2019-03-18 RX ADMIN — OXYCODONE HYDROCHLORIDE 10 MILLIGRAM(S): 5 TABLET ORAL at 09:31

## 2019-03-18 RX ADMIN — OXYCODONE HYDROCHLORIDE 10 MILLIGRAM(S): 5 TABLET ORAL at 04:10

## 2019-03-18 RX ADMIN — Medication 1000 MILLIGRAM(S): at 13:30

## 2019-03-18 RX ADMIN — OXYCODONE HYDROCHLORIDE 10 MILLIGRAM(S): 5 TABLET ORAL at 22:18

## 2019-03-18 NOTE — PACU DISCHARGE NOTE - COMMENTS
Report given to Richa, receiving RN on 5 east; pt. transferred to inpt. room 545-2, [address], via stretcher accompanied by transport tech.

## 2019-03-18 NOTE — PROCEDURAL SAFETY CHECKLIST WITH OR WITHOUT SEDATION - NSPOSTCOMMENTFT_GEN_ALL_CORE
Pt. given viscous lidocaine, swish and swallow, by Dr. Mckay at 1053; probe inserted by Dr. Mckay at 1058; Pt. given viscous lidocaine, swish and swallow, by Dr. Mckay at 1053; probe inserted by Dr. Mckay at 1058;  bubble study completed at 1112; study completed and probe removed at 1113; nathalie. well.

## 2019-03-18 NOTE — PROCEDURAL SAFETY CHECKLIST WITH OR WITHOUT SEDATION - NSPREPROCFT_GEN_ALL_CORE
"Chief Complaint   Patient presents with     Conjunctivitis       Initial Pulse 151  Temp(Src) 98.2  F (36.8  C) (Tympanic)  Wt 25 lb 5.5 oz (11.496 kg)  SpO2 99% Estimated body mass index is 18.56 kg/(m^2) as calculated from the following:    Height as of 8/19/16: 2' 7\" (0.787 m).    Weight as of this encounter: 25 lb 5.5 oz (11.496 kg).  BP completed using cuff size: NA (Not Taken)  Dyana Baugh CMA      "
GABRIELA

## 2019-03-18 NOTE — ADVANCED PRACTICE NURSE CONSULT - ASSESSMENT
Rec'd. order to place Midline catheter for long-term IV abx. Reviewed chart, labwork, explained all risks and benefits and patient A&Ox3 and verbalized understanding. Inserted Arrow Midline 20g 8 cm. length to left brachial via ultrasound guidance using sterile technique, brisk blood return, flushes well w/20 ml. NS. Endcap, statlock and biopatch placed. Pt. tolerated well. No CXR needed for midline. Report given to district nurse. Lot #[id number].

## 2019-03-18 NOTE — PROGRESS NOTE ADULT - ASSESSMENT
64 y/o male with a PMHx of AAA, CAD, CABG, left carotid stenosis, HTN, HLD initially presented 3/10 to  ED c/o hematoma and redness to area around L carotid artery, starting 3/9. Pt reports he had carotid artery surgery on 1/11/19 for 80% blockage, done by Dr. Burciaga, was not taken off Plavix during the surgery. Pt subsequently developed a hematoma that frequently became infected.  Recently admitted to [place] for infection, put on IV abx and d/c on PO abx. Finished course 1 week ago. Pt returned 3/12 d/t positive blood cx with MSSA, had CT neck 3/10 showing decreasing size of L carotid hematoma, was eval by vascular and given IV vanco/zosyn.     1. sepsis with MSSA secondary to infected L carotid hematoma  - s/p Left neck exploration, hematoma evacuation & graft placement 3/12  - improving   - on ancef 2gmq8h #7 continue with abx coverage   - sensitivities reviewed, cx are MSSA not MRSA no further vanco needed  - TTE/GABRIELA no obvious vegetations  - vascular f/u noted  - blood cx no growth 3/14, 3/16   - plan for 14 day IV abx coverage via midline rocephin [id number] until 3/28/19  - monitor temps  - tolerating abx well so far; no side effects noted  - reason for abx use and side effects reviewed with patient  - supportive care  - fu cbc    2. other issues -care per medicine

## 2019-03-18 NOTE — PROGRESS NOTE ADULT - ASSESSMENT
62 yo male s/p evacuation of left infected carotid hematoma, with removal of graft and interposition carotid graft with rgsv  POD #6    -Monitor vitals  -Diet: NPO  - Continue IV abx (ancef) per ID  -FU GABRIELA results today  - plavix held for now - on asa/statin  - left neck wound healing well - no sign of infection, bleeding, or hematoma  - groin wound dressing changed - cdi  - labs reviewed, monitor blood cx  - Encourage OOB/ambulation  - Encourage IS use  -DVT/GI ppx    Will discuss plan with Dr. Burciaga, Vascular surgery attending

## 2019-03-19 LAB
CULTURE RESULTS: SIGNIFICANT CHANGE UP
CULTURE RESULTS: SIGNIFICANT CHANGE UP
SPECIMEN SOURCE: SIGNIFICANT CHANGE UP
SPECIMEN SOURCE: SIGNIFICANT CHANGE UP

## 2019-03-19 RX ADMIN — Medication 100 MILLIGRAM(S): at 22:15

## 2019-03-19 RX ADMIN — OXYCODONE HYDROCHLORIDE 10 MILLIGRAM(S): 5 TABLET ORAL at 10:08

## 2019-03-19 RX ADMIN — Medication 1000 MILLIGRAM(S): at 09:33

## 2019-03-19 RX ADMIN — Medication 100 MILLIGRAM(S): at 04:14

## 2019-03-19 RX ADMIN — OXYCODONE HYDROCHLORIDE 10 MILLIGRAM(S): 5 TABLET ORAL at 04:49

## 2019-03-19 RX ADMIN — Medication 81 MILLIGRAM(S): at 12:34

## 2019-03-19 RX ADMIN — Medication 1000 MILLIGRAM(S): at 16:44

## 2019-03-19 RX ADMIN — OXYCODONE HYDROCHLORIDE 10 MILLIGRAM(S): 5 TABLET ORAL at 16:11

## 2019-03-19 RX ADMIN — ROSUVASTATIN CALCIUM 20 MILLIGRAM(S): 5 TABLET ORAL at 22:15

## 2019-03-19 RX ADMIN — OXYCODONE HYDROCHLORIDE 10 MILLIGRAM(S): 5 TABLET ORAL at 16:45

## 2019-03-19 RX ADMIN — OXYCODONE HYDROCHLORIDE 10 MILLIGRAM(S): 5 TABLET ORAL at 04:13

## 2019-03-19 RX ADMIN — Medication 1000 MILLIGRAM(S): at 16:12

## 2019-03-19 RX ADMIN — OXYCODONE HYDROCHLORIDE 10 MILLIGRAM(S): 5 TABLET ORAL at 22:16

## 2019-03-19 RX ADMIN — OXYCODONE HYDROCHLORIDE 10 MILLIGRAM(S): 5 TABLET ORAL at 16:46

## 2019-03-19 RX ADMIN — Medication 100 MILLIGRAM(S): at 14:07

## 2019-03-19 RX ADMIN — OXYCODONE HYDROCHLORIDE 10 MILLIGRAM(S): 5 TABLET ORAL at 22:46

## 2019-03-19 RX ADMIN — Medication 1000 MILLIGRAM(S): at 16:45

## 2019-03-19 NOTE — PROGRESS NOTE ADULT - PROVIDER SPECIALTY LIST ADULT
Cardiology
Critical Care
Infectious Disease
Surgery
Vascular Surgery
Cardiology
Infectious Disease

## 2019-03-19 NOTE — PROGRESS NOTE ADULT - ASSESSMENT
64 yo male s/p evacuation of left infected carotid hematoma, with removal of graft and interposition carotid graft with rgsv  POD #6    - Doing well, afebrile, repeat cultures 3/14, 3/16 negative to date  - abx per ID - ancef 2g q8. picc line inserted yesterday will go home on rocephin until 3/28  - TTE/GABRIELA negative for vegetations  - plavix held for now - on asa/statin  - left neck wound healing well - no sign of infection, bleeding, or hematoma  - labs reviewed, monitor blood cx  - Anticipate possible dc home tmrw  - will discuss w Dr Burciaga

## 2019-03-19 NOTE — PROGRESS NOTE ADULT - REASON FOR ADMISSION
Sepsis
+VE BLOOD CX
Sepsis/pos Blood Cx w/ Carotid artery revision
+VE BLOOD CX
Sepsis
+VE BLOOD CX

## 2019-03-19 NOTE — PROGRESS NOTE ADULT - SUBJECTIVE AND OBJECTIVE BOX
Date of service: 03-15-19 @ 11:47    pt seen and examined  s/p Left neck exploration, hematoma evacuation and Left Carotid- Carotid Interposition graft with GSV  POD #3  fevers down  feels better      ROS: denies dizziness, no HA, no SOB or cough, no abdominal pain, no diarrhea or constipation; no dysuria, no urinary frequency, no legs pain, no rashes      MEDICATIONS  (STANDING):  aspirin enteric coated 81 milliGRAM(s) Oral daily  ceFAZolin   IVPB 2000 milliGRAM(s) IV Intermittent every 8 hours  docusate sodium 100 milliGRAM(s) Oral three times a day  metoprolol succinate ER 25 milliGRAM(s) Oral daily  potassium phosphate / sodium phosphate powder 2 Packet(s) Oral two times a day  rosuvastatin 20 milliGRAM(s) Oral at bedtime      Vital Signs Last 24 Hrs  T(C): 37.1 (15 Mar 2019 04:59), Max: 37.2 (14 Mar 2019 17:24)  T(F): 98.7 (15 Mar 2019 04:59), Max: 98.9 (14 Mar 2019 17:24)  HR: 66 (15 Mar 2019 04:59) (66 - 83)  BP: 107/41 (15 Mar 2019 04:59) (99/47 - 129/55)  BP(mean): 70 (14 Mar 2019 15:40) (58 - 97)  RR: 18 (15 Mar 2019 04:59) (16 - 24)  SpO2: 100% (15 Mar 2019 04:59) (98% - 100%)          PE:  Constitutional: frail looking  HEENT: NC/AT, EOMI, PERRLA, conjunctivae clear; ears and nose atraumatic; pharynx benign  Neck: supple; thyroid not palpable, L carotid hematoma w /graft/dressing in place   Back: no tenderness  Respiratory: respiratory effort normal; clear to auscultation  Cardiovascular: S1S2 regular, no murmurs  Abdomen: soft, not tender, not distended, positive BS; liver and spleen WNL  Genitourinary: no suprapubic tenderness  Lymphatic: no LN palpable  Musculoskeletal: no muscle tenderness, no joint swelling or tenderness  Extremities: no pedal edema  Neurological/ Psychiatric:  moving all extremities  Skin: no rashes; no palpable lesions    Labs: all available labs reviewed                                   9.1    7.89  )-----------( 232      ( 15 Mar 2019 07:10 )             27.9     03-15    143  |  109<H>  |  5<L>  ----------------------------<  114<H>  4.0   |  27  |  0.69    Ca    9.5      15 Mar 2019 07:10  Phos  2.6     03-15  Mg     2.1     03-15             Cultures:     Culture - Blood (03.12.19 @ 11:35)    Gram Stain:   Growth in aerobic bottle: Gram Positive Cocci in Clusters  Growth in anaerobic bottle: Gram Positive Cocci in Clusters    Specimen Source: .Blood Blood    Culture Results:   Growth in aerobic and anaerobic bottles: Staphylococcus aureus  See previous culture [id number]    Culture - Blood (03.12.19 @ 11:35)    Gram Stain:   Growth in aerobic and anaerobic bottles: Gram Positive Cocci in Clusters    Specimen Source: .Blood Blood    Culture Results:   Growth in aerobic and anaerobic bottles: Staphylococcus aureus  See previous culture [id number]          Radiology: all available radiological tests reviewed  < from: CT Angio Neck w/ IV Cont (03.10.19 @ 18:22) >    EXAM:  CT ANGIO NECK (W)AW IC                                PROCEDURE DATE:  03/10/2019          INTERPRETATION:      EXAM:    CT Angiography Neck With Contrast     EXAM DATE/TIME:    3/10/2019 6:18 PM     CLINICAL HISTORY:    63 years old, male; Signs and symptoms; Other: Swelling redness; Prior   surgery; Surgery date: 1-6 months     TECHNIQUE:    Axial computed tomographic angiography images of the neck with   intravenous   contrast using CT angiography protocol.    Coronal and sagittal reformatted images were created and reviewed.    MIP reconstructed images were created and reviewed.     COMPARISON:    US CAROTID DUPLEX COMPLETE BILATERAL 11/20/2018 3:13 PM , CTA dated   2/6/2019    FINDINGS:     VASCULATURE:    Right common carotid artery: Normal. No significant stenosis. No   dissection or   occlusion.    Right internal carotid artery:  Less than 50% stenosis proximal right   ICA.    Right external carotid artery: Normal. No occlusion or significant   stenosis.    Right vertebral artery: Normal. No significant stenosis. No dissection   or   occlusion.      Left common carotid artery: Normal. No significant stenosis. No   dissection or   occlusion.    Left internal carotid artery:  Edematous change soft tissues of the left   neck  and left sternocleidomastoid muscle with decrease in size of previously   noted hematoma with probable 1.5 cm residual. Previous left carotid   endarterectomy. No significant stenosis.    Left external carotid artery: Normal. No occlusion or significant   stenosis.    Left vertebral artery: Normal. No significant stenosis. No dissection or   occlusion.       NECK:    Thyroid:  Several low-attenuation thyroid nodules, largest 6 mm.    Bones/joints: No acute fracture.    Soft tissues:  See Left Internal Carotid Artery Finding.     IMPRESSION:   1. Edematous change soft tissues of the left neck   and left sternocleidomastoid muscle with decrease in size of previously   noted hematoma with probable 1.5 cm residual.   2. Less than 50% stenosis proximal right ICA.     COMMENT:    Reference per NASCET criteria for degree of stenosis: Mild: less than   50%   stenosis. Moderate: 50-69% stenosis. Severe: 70-94% stenosis. Near   occlusion:   95-99% stenosis.      Advanced directives addressed: full resuscitation
Date of service: 03-13-19 @ 12:24    pt seen and examined  s/p Left neck exploration, hematoma evacuation and Left Carotid- Carotid Interposition graft with GSV  POD #1 afebrile, noted with hypotension in icu on pressors      ROS: no fever or chills; denies dizziness, no HA, no SOB or cough, no abdominal pain, no diarrhea or constipation; no dysuria, no urinary frequency, no legs pain, no rashes    MEDICATIONS  (STANDING):  ceFAZolin   IVPB 2000 milliGRAM(s) IV Intermittent every 8 hours  clopidogrel Tablet 75 milliGRAM(s) Oral daily  metoprolol succinate ER 25 milliGRAM(s) Oral daily  oxyCODONE    IR 5 milliGRAM(s) Oral once  phenylephrine    Infusion 0.5 MICROgram(s)/kG/Min (13.331 mL/Hr) IV Continuous <Continuous>  rosuvastatin 20 milliGRAM(s) Oral at bedtime  sodium chloride 0.9%. 1000 milliLiter(s) (75 mL/Hr) IV Continuous <Continuous>      Vital Signs Last 24 Hrs  T(C): 36.9 (13 Mar 2019 09:00), Max: 37.1 (13 Mar 2019 03:00)  T(F): 98.5 (13 Mar 2019 09:00), Max: 98.8 (13 Mar 2019 03:00)  HR: 68 (13 Mar 2019 11:00) (59 - 78)  BP: 111/39 (13 Mar 2019 11:00) (75/38 - 148/58)  BP(mean): 56 (13 Mar 2019 11:00) (47 - 82)  RR: 22 (13 Mar 2019 11:00) (8 - 23)  SpO2: 100% (13 Mar 2019 11:00) (98% - 100%)        PE:  Constitutional: frail looking  HEENT: NC/AT, EOMI, PERRLA, conjunctivae clear; ears and nose atraumatic; pharynx benign  Neck: supple; thyroid not palpable, L carotid hematoma w /graft/dressing in place   Back: no tenderness  Respiratory: respiratory effort normal; clear to auscultation  Cardiovascular: S1S2 regular, no murmurs  Abdomen: soft, not tender, not distended, positive BS; liver and spleen WNL  Genitourinary: no suprapubic tenderness  Lymphatic: no LN palpable  Musculoskeletal: no muscle tenderness, no joint swelling or tenderness  Extremities: no pedal edema  Neurological/ Psychiatric:  moving all extremities  Skin: no rashes; no palpable lesions    Labs: all available labs reviewed                                   10.0   12.36 )-----------( 212      ( 13 Mar 2019 06:45 )             30.3     03-13    141  |  112<H>  |  10  ----------------------------<  164<H>  4.2   |  19<L>  |  0.74    Ca    8.6      13 Mar 2019 06:45    TPro  7.9  /  Alb  3.8  /  TBili  0.4  /  DBili  x   /  AST  28  /  ALT  27  /  AlkPhos  97  03-12           Cultures:     Culture - Blood (03.12.19 @ 11:35)    Gram Stain:   Growth in aerobic bottle: Gram Positive Cocci in Clusters    Specimen Source: .Blood Blood    Culture Results:   Growth in aerobic bottle: Gram Positive Cocci in Clusters    Culture - Blood (03.12.19 @ 11:35)    Gram Stain:   Growth in aerobic and anaerobic bottles: Gram Positive Cocci in Clusters    Specimen Source: .Blood Blood    Culture Results:   Growth in aerobic and anaerobic bottles: Gram Positive Cocci in Clusters    Culture - Blood (03.12.19 @ 11:35)    Gram Stain:   Growth in aerobic and anaerobic bottles: Gram Positive Cocci in Clusters    Specimen Source: .Blood Blood    Culture Results:   Growth in aerobic and anaerobic bottles: Gram Positive Cocci in Clusters        Radiology: all available radiological tests reviewed  < from: CT Angio Neck w/ IV Cont (03.10.19 @ 18:22) >    EXAM:  CT ANGIO NECK (W)AW IC                                PROCEDURE DATE:  03/10/2019          INTERPRETATION:      EXAM:    CT Angiography Neck With Contrast     EXAM DATE/TIME:    3/10/2019 6:18 PM     CLINICAL HISTORY:    63 years old, male; Signs and symptoms; Other: Swelling redness; Prior   surgery; Surgery date: 1-6 months     TECHNIQUE:    Axial computed tomographic angiography images of the neck with   intravenous   contrast using CT angiography protocol.    Coronal and sagittal reformatted images were created and reviewed.    MIP reconstructed images were created and reviewed.     COMPARISON:    US CAROTID DUPLEX COMPLETE BILATERAL 11/20/2018 3:13 PM , CTA dated   2/6/2019    FINDINGS:     VASCULATURE:    Right common carotid artery: Normal. No significant stenosis. No   dissection or   occlusion.    Right internal carotid artery:  Less than 50% stenosis proximal right   ICA.    Right external carotid artery: Normal. No occlusion or significant   stenosis.    Right vertebral artery: Normal. No significant stenosis. No dissection   or   occlusion.      Left common carotid artery: Normal. No significant stenosis. No   dissection or   occlusion.    Left internal carotid artery:  Edematous change soft tissues of the left   neck  and left sternocleidomastoid muscle with decrease in size of previously   noted hematoma with probable 1.5 cm residual. Previous left carotid   endarterectomy. No significant stenosis.    Left external carotid artery: Normal. No occlusion or significant   stenosis.    Left vertebral artery: Normal. No significant stenosis. No dissection or   occlusion.       NECK:    Thyroid:  Several low-attenuation thyroid nodules, largest 6 mm.    Bones/joints: No acute fracture.    Soft tissues:  See Left Internal Carotid Artery Finding.     IMPRESSION:   1. Edematous change soft tissues of the left neck   and left sternocleidomastoid muscle with decrease in size of previously   noted hematoma with probable 1.5 cm residual.   2. Less than 50% stenosis proximal right ICA.     COMMENT:    Reference per NASCET criteria for degree of stenosis: Mild: less than   50%   stenosis. Moderate: 50-69% stenosis. Severe: 70-94% stenosis. Near   occlusion:   95-99% stenosis.      Advanced directives addressed: full resuscitation
Date of service: 03-14-19 @ 11:30    pt seen and examined  s/p Left neck exploration, hematoma evacuation and Left Carotid- Carotid Interposition graft with GSV  POD #2  febrile to 101   off pressors  feels better       ROS: no fever or chills; denies dizziness, no HA, no SOB or cough, no abdominal pain, no diarrhea or constipation; no dysuria, no urinary frequency, no legs pain, no rashes      MEDICATIONS  (STANDING):  ceFAZolin   IVPB 2000 milliGRAM(s) IV Intermittent every 8 hours  clopidogrel Tablet 75 milliGRAM(s) Oral daily  docusate sodium 100 milliGRAM(s) Oral daily  metoprolol succinate ER 25 milliGRAM(s) Oral daily  rosuvastatin 20 milliGRAM(s) Oral at bedtime      Vital Signs Last 24 Hrs  T(C): 37.2 (14 Mar 2019 09:00), Max: 38.8 (13 Mar 2019 19:45)  T(F): 98.9 (14 Mar 2019 09:00), Max: 101.8 (13 Mar 2019 19:45)  HR: 80 (14 Mar 2019 11:00) (71 - 100)  BP: 134/66 (14 Mar 2019 11:00) (107/44 - 148/60)  BP(mean): 74 (14 Mar 2019 11:00) (58 - 91)  RR: 16 (14 Mar 2019 11:00) (13 - 27)  SpO2: 98% (14 Mar 2019 11:00) (93% - 100%)      PE:  Constitutional: frail looking  HEENT: NC/AT, EOMI, PERRLA, conjunctivae clear; ears and nose atraumatic; pharynx benign  Neck: supple; thyroid not palpable, L carotid hematoma w /graft/dressing in place   Back: no tenderness  Respiratory: respiratory effort normal; clear to auscultation  Cardiovascular: S1S2 regular, no murmurs  Abdomen: soft, not tender, not distended, positive BS; liver and spleen WNL  Genitourinary: no suprapubic tenderness  Lymphatic: no LN palpable  Musculoskeletal: no muscle tenderness, no joint swelling or tenderness  Extremities: no pedal edema  Neurological/ Psychiatric:  moving all extremities  Skin: no rashes; no palpable lesions    Labs: all available labs reviewed                                   9.4    8.16  )-----------( 220      ( 14 Mar 2019 06:24 )             29.0     03-14    144  |  113<H>  |  6<L>  ----------------------------<  126<H>  3.8   |  25  |  0.65    Ca    9.2      14 Mar 2019 06:24  Phos  1.7     03-14  Mg     2.0     03-14    TPro  7.9  /  Alb  3.8  /  TBili  0.4  /  DBili  x   /  AST  28  /  ALT  27  /  AlkPhos  97  03-12         Cultures:     Culture - Blood (03.12.19 @ 11:35)    Gram Stain:   Growth in aerobic bottle: Gram Positive Cocci in Clusters    Specimen Source: .Blood Blood    Culture Results:   Growth in aerobic bottle: Gram Positive Cocci in Clusters    Culture - Blood (03.12.19 @ 11:35)    Gram Stain:   Growth in aerobic and anaerobic bottles: Gram Positive Cocci in Clusters    Specimen Source: .Blood Blood    Culture Results:   Growth in aerobic and anaerobic bottles: Gram Positive Cocci in Clusters    Culture - Blood (03.12.19 @ 11:35)    Gram Stain:   Growth in aerobic and anaerobic bottles: Gram Positive Cocci in Clusters    Specimen Source: .Blood Blood    Culture Results:   Growth in aerobic and anaerobic bottles: Gram Positive Cocci in Clusters        Radiology: all available radiological tests reviewed  < from: CT Angio Neck w/ IV Cont (03.10.19 @ 18:22) >    EXAM:  CT ANGIO NECK (W)AW IC                                PROCEDURE DATE:  03/10/2019          INTERPRETATION:      EXAM:    CT Angiography Neck With Contrast     EXAM DATE/TIME:    3/10/2019 6:18 PM     CLINICAL HISTORY:    63 years old, male; Signs and symptoms; Other: Swelling redness; Prior   surgery; Surgery date: 1-6 months     TECHNIQUE:    Axial computed tomographic angiography images of the neck with   intravenous   contrast using CT angiography protocol.    Coronal and sagittal reformatted images were created and reviewed.    MIP reconstructed images were created and reviewed.     COMPARISON:    US CAROTID DUPLEX COMPLETE BILATERAL 11/20/2018 3:13 PM , CTA dated   2/6/2019    FINDINGS:     VASCULATURE:    Right common carotid artery: Normal. No significant stenosis. No   dissection or   occlusion.    Right internal carotid artery:  Less than 50% stenosis proximal right   ICA.    Right external carotid artery: Normal. No occlusion or significant   stenosis.    Right vertebral artery: Normal. No significant stenosis. No dissection   or   occlusion.      Left common carotid artery: Normal. No significant stenosis. No   dissection or   occlusion.    Left internal carotid artery:  Edematous change soft tissues of the left   neck  and left sternocleidomastoid muscle with decrease in size of previously   noted hematoma with probable 1.5 cm residual. Previous left carotid   endarterectomy. No significant stenosis.    Left external carotid artery: Normal. No occlusion or significant   stenosis.    Left vertebral artery: Normal. No significant stenosis. No dissection or   occlusion.       NECK:    Thyroid:  Several low-attenuation thyroid nodules, largest 6 mm.    Bones/joints: No acute fracture.    Soft tissues:  See Left Internal Carotid Artery Finding.     IMPRESSION:   1. Edematous change soft tissues of the left neck   and left sternocleidomastoid muscle with decrease in size of previously   noted hematoma with probable 1.5 cm residual.   2. Less than 50% stenosis proximal right ICA.     COMMENT:    Reference per NASCET criteria for degree of stenosis: Mild: less than   50%   stenosis. Moderate: 50-69% stenosis. Severe: 70-94% stenosis. Near   occlusion:   95-99% stenosis.      Advanced directives addressed: full resuscitation
Date of service: 03-18-19 @ 12:18    pt seen and examined  s/p Left neck exploration, hematoma evacuation and Left Carotid- Carotid Interposition graft with GSV  POD #6  no further fevers  GABRIELA this am no vegetations     ROS: denies dizziness, no HA, no SOB or cough, no abdominal pain, no diarrhea or constipation; no dysuria, no urinary frequency, no legs pain, no rashes      MEDICATIONS  (STANDING):  aspirin enteric coated 81 milliGRAM(s) Oral daily  ceFAZolin   IVPB 2000 milliGRAM(s) IV Intermittent every 8 hours  docusate sodium 100 milliGRAM(s) Oral three times a day  metoprolol succinate ER 25 milliGRAM(s) Oral daily  rosuvastatin 20 milliGRAM(s) Oral at bedtime    Vital Signs Last 24 Hrs  T(C): 37.1 (18 Mar 2019 09:29), Max: 37.3 (17 Mar 2019 16:20)  T(F): 98.8 (18 Mar 2019 09:29), Max: 99.2 (17 Mar 2019 16:20)  HR: 78 (18 Mar 2019 11:46) (72 - 94)  BP: 129/77 (18 Mar 2019 11:46) (85/52 - 152/58)  BP(mean): 91 (18 Mar 2019 11:46) (60 - 91)  RR: 16 (18 Mar 2019 11:46) (16 - 98)  SpO2: 97% (18 Mar 2019 11:46) (94% - 100%    PE:  Constitutional: frail looking  HEENT: NC/AT, EOMI, PERRLA, conjunctivae clear; ears and nose atraumatic; pharynx benign  Neck: supple; thyroid not palpable, L carotid hematoma w /graft/dressing in place   Back: no tenderness  Respiratory: respiratory effort normal; clear to auscultation  Cardiovascular: S1S2 regular, no murmurs  Abdomen: soft, not tender, not distended, positive BS; liver and spleen WNL  Genitourinary: no suprapubic tenderness  Lymphatic: no LN palpable  Musculoskeletal: no muscle tenderness, no joint swelling or tenderness  Extremities: no pedal edema  Neurological/ Psychiatric:  moving all extremities  Skin: no rashes; no palpable lesions    Labs: all available labs reviewed                                   8.7    8.72  )-----------( 285      ( 17 Mar 2019 07:47 )             27.3     03-17    141  |  108  |  9   ----------------------------<  107<H>  4.2   |  29  |  0.76    Ca    9.3      17 Mar 2019 07:47             Cultures:   Culture - Blood (03.16.19 @ 09:52)    Specimen Source: .Blood None    Culture Results:   No growth to date.    Culture - Blood in AM (03.14.19 @ 06:28)    Specimen Source: .Blood None    Culture Results:   No growth to date.      Culture - Blood (03.12.19 @ 11:35)    Gram Stain:   Growth in aerobic bottle: Gram Positive Cocci in Clusters  Growth in anaerobic bottle: Gram Positive Cocci in Clusters    Specimen Source: .Blood Blood    Culture Results:   Growth in aerobic and anaerobic bottles: Staphylococcus aureus  See previous culture [id number]    Culture - Blood (03.12.19 @ 11:35)    Gram Stain:   Growth in aerobic and anaerobic bottles: Gram Positive Cocci in Clusters    Specimen Source: .Blood Blood    Culture Results:   Growth in aerobic and anaerobic bottles: Staphylococcus aureus  See previous culture [id number]          Radiology: all available radiological tests reviewed  < from: CT Angio Neck w/ IV Cont (03.10.19 @ 18:22) >    EXAM:  CT ANGIO NECK (W)AW IC                                PROCEDURE DATE:  03/10/2019          INTERPRETATION:      EXAM:    CT Angiography Neck With Contrast     EXAM DATE/TIME:    3/10/2019 6:18 PM     CLINICAL HISTORY:    63 years old, male; Signs and symptoms; Other: Swelling redness; Prior   surgery; Surgery date: 1-6 months     TECHNIQUE:    Axial computed tomographic angiography images of the neck with   intravenous   contrast using CT angiography protocol.    Coronal and sagittal reformatted images were created and reviewed.    MIP reconstructed images were created and reviewed.     COMPARISON:    US CAROTID DUPLEX COMPLETE BILATERAL 11/20/2018 3:13 PM , CTA dated   2/6/2019    FINDINGS:     VASCULATURE:    Right common carotid artery: Normal. No significant stenosis. No   dissection or   occlusion.    Right internal carotid artery:  Less than 50% stenosis proximal right   ICA.    Right external carotid artery: Normal. No occlusion or significant   stenosis.    Right vertebral artery: Normal. No significant stenosis. No dissection   or   occlusion.      Left common carotid artery: Normal. No significant stenosis. No   dissection or   occlusion.    Left internal carotid artery:  Edematous change soft tissues of the left   neck  and left sternocleidomastoid muscle with decrease in size of previously   noted hematoma with probable 1.5 cm residual. Previous left carotid   endarterectomy. No significant stenosis.    Left external carotid artery: Normal. No occlusion or significant   stenosis.    Left vertebral artery: Normal. No significant stenosis. No dissection or   occlusion.       NECK:    Thyroid:  Several low-attenuation thyroid nodules, largest 6 mm.    Bones/joints: No acute fracture.    Soft tissues:  See Left Internal Carotid Artery Finding.     IMPRESSION:   1. Edematous change soft tissues of the left neck   and left sternocleidomastoid muscle with decrease in size of previously   noted hematoma with probable 1.5 cm residual.   2. Less than 50% stenosis proximal right ICA.     COMMENT:    Reference per NASCET criteria for degree of stenosis: Mild: less than   50%   stenosis. Moderate: 50-69% stenosis. Severe: 70-94% stenosis. Near   occlusion:   95-99% stenosis.      Advanced directives addressed: full resuscitation
HPI:  Patient is 63-year-old with history of hypertension, coronary artery disease status post CABG 2009, history of ischemic cardiomyopathy, left ventricular ejection fraction 50-55% in the past, he recently had a stent placed to 1st diagonal of LAD at [facility] on December 10, 2018, at that time his CORREA to LAD was patent and saphenous vein gout 2 OM1/or 2 were also patent. He also has a history of aortic abdominal aneurysm status post endovascular repair in the past, he recently had an episode of TIA and he was diagnosed to have significant carotid artery disease and he status post surgery. After surgery he has been having hematoma at the surgical site and mild bleeding. His recent blood cultures are reported to be positive . He had an episode of fever with chills last night but now he is afebrile. There is no evidence of any bleeding and the surgical site. I discussed with Vascular surgeon he states he can continue to stay on Plavix. He denies any chest pain or shortness of breath. He is in normal sinus rhythm on telemetry.      PAST MEDICAL & SURGICAL HISTORY:  Hematoma  TIA (transient ischemic attack): 11/2018  Stented coronary artery: 12/10/2018 D1  TIA (transient ischemic attack): 11/2018  Carotid stenosis, left  Hyperlipidemia  AAA (Abdominal Aortic Aneurysm): Dx. 5/2009- attempted repair  12/2009 surgery aborted due to hypotension.r  HTN (Hypertension), Benign  ADD (Attention Deficit Disorder)  CABG (Coronary Artery Bypass Graft)x4 6/09  CAD (Coronary Artery Disease)  History of left-sided carotid endarterectomy  S/P AAA repair: 2010  S/P CABG (Coronary Artery Bypass Graft): 5/2009 x 4 vessels  right hernia repair  S/P Tonsillectomy          MEDICATIONS  (STANDING):  ceFAZolin   IVPB 2000 milliGRAM(s) IV Intermittent every 8 hours  clopidogrel Tablet 75 milliGRAM(s) Oral daily  docusate sodium 100 milliGRAM(s) Oral daily  metoprolol succinate ER 25 milliGRAM(s) Oral daily  rosuvastatin 20 milliGRAM(s) Oral at bedtime  vancomycin  IVPB 1000 milliGRAM(s) IV Intermittent once  vancomycin  IVPB        MEDICATIONS  (PRN):  acetaminophen   Tablet .. 1000 milliGRAM(s) Oral every 6 hours PRN Temp greater or equal to 38C (100.4F), Mild Pain (1 - 3), Moderate Pain (4 - 6), Severe Pain (7 - 10)  oxyCODONE    IR 10 milliGRAM(s) Oral every 6 hours PRN Moderate Pain (4 - 6)        REVIEW OF SYSTEM:  Pertinent items are noted in HPI.  Constitutional	Notable  for chills, fevers . No  sweats.    Eyes: 	Negative for visual disturbance.  Ears, nose, mouth, throat, and face: Negative for epistaxis .  Neck:	Negative for enlargement, pain and difficulty in swallowing  Respiration : Negative for cough, dyspnea on exertion .  Cardiovascular: Negative for chest pain, dyspnea and palpitations    Gastrointestinal : Negative for abdominal pain, diarrhea, nausea and vomiting  Genitourinary: Negative for dysuria, frequency and urinary incontinence .  Skin: Negative for  rash, pruritus, swelling, dryness .  	  Hematologic/lymphatic: Negative for bleeding and easy bruising  Neurological: Negative for dizziness, headaches, seizures and tremors  Behavioral/Psych: Negative for mood change, depression.  Endocrine:	Negative for blurry vision, polydipsia and polyuria, diaphoresis.   Allergic/Immunologic:	Negative for anaphylaxis, angioedema and urticaria.      Vital Signs Last 24 Hrs  T(C): 37.2 (14 Mar 2019 09:00), Max: 38.8 (13 Mar 2019 19:45)  T(F): 98.9 (14 Mar 2019 09:00), Max: 101.8 (13 Mar 2019 19:45)  HR: 86 (14 Mar 2019 10:00) (68 - 100)  BP: 129/63 (14 Mar 2019 10:00) (107/44 - 148/60)  BP(mean): 76 (14 Mar 2019 10:00) (56 - 91)  RR: 14 (14 Mar 2019 10:00) (13 - 27)  SpO2: 99% (14 Mar 2019 10:00) (93% - 100%)    I&O's Summary    13 Mar 2019 07:01  -  14 Mar 2019 07:00  --------------------------------------------------------  IN: 1952 mL / OUT: 3600 mL / NET: -1648 mL      PHYSICAL EXAM  General Appearance: cooperative, no acute distress,   HEENT: PERRL, conjunctiva clear, EOM's intact, non injected pharynx .  Neck: Supple, , no adenopathy, thyroid: not enlarged, no carotid bruit or JVD . there is a dressing of the left side of the neck which is changed a few minutes ago by the surgeon.  Lungs: Clear to auscultation bilateral,no adventitious breath sounds, normal   expiratory phase  Heart: Regular rate and rhythm, S1, S2 normal, 1/6 HS murmur,no  rub or gallop  Abdomen: Soft, non-tender, bowel sounds active , no hepatosplenomegaly  Extremities: no cyanosis or edema, no joint swelling  Skin: Skin color, texture normal, no rashes   Neurologic: Alert and oriented X3 , cranial nerves intact, sensory and motor normal,        INTERPRETATION OF TELEMETRY: NSR           LABS:                          9.4    8.16  )-----------( 220      ( 14 Mar 2019 06:24 )             29.0     03-14    144  |  113<H>  |  6<L>  ----------------------------<  126<H>  3.8   |  25  |  0.65    Ca    9.2      14 Mar 2019 06:24  Phos  1.7     03-14  Mg     2.0     03-14    TPro  7.9  /  Alb  3.8  /  TBili  0.4  /  DBili  x   /  AST  28  /  ALT  27  /  AlkPhos  97  03-12            PT/INR - ( 12 Mar 2019 11:35 )   PT: 13.3 sec;   INR: 1.19 ratio         PTT - ( 12 Mar 2019 11:35 )  PTT:37.0 sec
In ICU post-exploration of LEFT neck, excision of LEFT internal and external carotid arteries, and transposition bypass graft from saphenous vein.      Pt is 62 yo male s/p LEFT CEA approx 2 months ago and re-operation for subsequent infected hematoma.  Admitted to  3/12/19 due to Positive blood cultures (drawn 3/10) demonstrating bacteremia with gram Positive cocci.  Pt also noted episode of bleeding from the surgical site on the evening of 3/11/19 which "soaked" his pillow.     In ICU pt AAOx3 NAD, NRD generally comfortable.      3/14: No events overnight           PAST MEDICAL & SURGICAL HISTORY:  Hematoma  TIA (transient ischemic attack): 11/2018  Stented coronary artery: 12/10/2018         D1  TIA (transient ischemic attack): 11/2018  Carotid stenosis, left  Hyperlipidemia  AAA (Abdominal Aortic Aneurysm): Dx. 5/2009- attempted repair  12/2009 surgery aborted due to hypotension.r  HTN (Hypertension), Benign  ADD (Attention Deficit Disorder)  CABG (Coronary Artery Bypass Graft)x4 6/09  CAD (Coronary Artery Disease)  History of left-sided carotid endarterectomy  S/P AAA repair: 2010  S/P CABG (Coronary Artery Bypass Graft): 5/2009 x 4 vessels  right hernia repair  S/P Tonsillectomy      FAMILY HISTORY:  Family history of myocardial infarction (Grandparent)      Social Hx:    Allergies    No Known Allergies    Intolerances            ICU Vital Signs Last 24 Hrs  T(C): 37.2 (14 Mar 2019 09:00), Max: 38.8 (13 Mar 2019 19:45)  T(F): 98.9 (14 Mar 2019 09:00), Max: 101.8 (13 Mar 2019 19:45)  HR: 80 (14 Mar 2019 09:00) (68 - 100)  BP: 136/58 (14 Mar 2019 09:00) (107/44 - 148/60)  BP(mean): 77 (14 Mar 2019 09:00) (56 - 91)  ABP: 142/53 (13 Mar 2019 16:00) (124/49 - 160/67)  ABP(mean): 81 (13 Mar 2019 16:00) (73 - 103)  RR: 24 (14 Mar 2019 09:00) (13 - 27)  SpO2: 97% (14 Mar 2019 09:00) (93% - 100%)          I&O's Summary    13 Mar 2019 07:01  -  14 Mar 2019 07:00  --------------------------------------------------------  IN: 1952 mL / OUT: 3600 mL / NET: -1648 mL                              9.4    8.16  )-----------( 220      ( 14 Mar 2019 06:24 )             29.0       03-14    144  |  113<H>  |  6<L>  ----------------------------<  126<H>  3.8   |  25  |  0.65    Ca    9.2      14 Mar 2019 06:24  Phos  1.7     03-14  Mg     2.0     03-14    TPro  7.9  /  Alb  3.8  /  TBili  0.4  /  DBili  x   /  AST  28  /  ALT  27  /  AlkPhos  97  03-12      MEDICATIONS  (STANDING):  ceFAZolin   IVPB 2000 milliGRAM(s) IV Intermittent every 8 hours  clopidogrel Tablet 75 milliGRAM(s) Oral daily  docusate sodium 100 milliGRAM(s) Oral daily  metoprolol succinate ER 25 milliGRAM(s) Oral daily  rosuvastatin 20 milliGRAM(s) Oral at bedtime  vancomycin  IVPB 1000 milliGRAM(s) IV Intermittent once  vancomycin  IVPB        MEDICATIONS  (PRN):  acetaminophen   Tablet .. 1000 milliGRAM(s) Oral every 6 hours PRN Temp greater or equal to 38C (100.4F), Mild Pain (1 - 3), Moderate Pain (4 - 6), Severe Pain (7 - 10)  oxyCODONE    IR 10 milliGRAM(s) Oral every 6 hours PRN Moderate Pain (4 - 6)      DVT Prophylaxis: V    Advanced Directives:  Discussed with:    Visit Information:    ** Time is exclusive of billed procedures and/or teaching and/or routine family updates.
No acute events overnight, downgraded from ICU to floor yesterday.  1 staple removed from left neck incision yesterday, wound packed, minimal drainage.  Dressing changed this am.     Vital Signs Last 24 Hrs  T(C): 37.1 (15 Mar 2019 04:59), Max: 37.2 (14 Mar 2019 17:24)  T(F): 98.7 (15 Mar 2019 04:59), Max: 98.9 (14 Mar 2019 17:24)  HR: 66 (15 Mar 2019 04:59) (66 - 83)  BP: 107/41 (15 Mar 2019 04:59) (99/47 - 129/55)  BP(mean): 70 (14 Mar 2019 15:40) (58 - 97)  RR: 18 (15 Mar 2019 04:59) (16 - 24)  SpO2: 100% (15 Mar 2019 04:59) (98% - 100%)    NAD, AOX3  EOMI, CN2-12 grossly intact  Left neck incision with small amount of drainage, no bleeding, dressing C/d/I.  No motor deficitis                          9.1    7.89  )-----------( 232      ( 15 Mar 2019 07:10 )             27.9   03-15    143  |  109<H>  |  5<L>  ----------------------------<  114<H>  4.0   |  27  |  0.69    Ca    9.5      15 Mar 2019 07:10  Phos  2.6     03-15  Mg     2.1     03-15
No events overnight, afebrile, vss. Denies chills, numbness, tingling, sob, cp, headaches, dizziness, muscle weakness.     general: nad, aao x3  heent: perrla, left neck incision clean and dry with nylon sutures loosely in place. No sign of active bleeding, hematoma, or infection. dressings changed cdi  pulm: cta x2, no wheezing  cards: no tyachycardia, s1/s2+  abdomen: soft, nt  extremities: sensation grossly intact, 5/5 strength throughout, rt groin wound no erythema or induration, dressing changed cdi    Vital Signs Last 24 Hrs  T(C): 37.3 (19 Mar 2019 04:09), Max: 37.3 (18 Mar 2019 12:51)  T(F): 99.2 (19 Mar 2019 04:09), Max: 99.2 (18 Mar 2019 12:51)  HR: 76 (19 Mar 2019 04:09) (72 - 91)  BP: 102/45 (19 Mar 2019 04:09) (85/52 - 152/58)  BP(mean): 91 (18 Mar 2019 11:46) (60 - 91)  RR: 16 (19 Mar 2019 04:09) (16 - 18)  SpO2: 100% (19 Mar 2019 04:09) (94% - 100%)    CBC Full  -  ( 17 Mar 2019 07:47 )  WBC Count : 8.72 K/uL  Hemoglobin : 8.7 g/dL  Hematocrit : 27.3 %  Platelet Count - Automated : 285 K/uL  Mean Cell Volume : 86.4 fl  Mean Cell Hemoglobin : 27.5 pg  Mean Cell Hemoglobin Concentration : 31.9 gm/dL  Auto Neutrophil # : x  Auto Lymphocyte # : x  Auto Monocyte # : x  Auto Eosinophil # : x  Auto Basophil # : x  Auto Neutrophil % : x  Auto Lymphocyte % : x  Auto Monocyte % : x  Auto Eosinophil % : x  Auto Basophil % : x  CBC Full  -  ( 15 Mar 2019 07:10 )  WBC Count : 7.89 K/uL  Hemoglobin : 9.1 g/dL  Hematocrit : 27.9 %  Platelet Count - Automated : 232 K/uL  Mean Cell Volume : 86.9 fl  Mean Cell Hemoglobin : 28.3 pg  Mean Cell Hemoglobin Concentration : 32.6 gm/dL  Auto Neutrophil # : x  Auto Lymphocyte # : x  Auto Monocyte # : x  Auto Eosinophil # : x  Auto Basophil # : x  Auto Neutrophil % : x  Auto Lymphocyte % : x  Auto Monocyte % : x  Auto Eosinophil % : x  Auto Basophil % : x
No events overnight, afebrile, vss. Tolerating diet, has not had bm since admission. Denies chills, numbness, tingling, sob, cp, headaches, dizziness, muscle weakness.    general: nad, aao x3  heent: perrla, left neck incision clean and dry with nylon sutures loosely in place. No sign of active bleeding, hematoma, or infection.   pulm: cta x2, no wheezing  cards: no tyachycardia, s1/s2+  abdomen: soft, nt  extremities: sensation grossly intact, 5/5 strength throughout, rt groin wound no erythema or induration, dressing changed cdi      CBC Full  -  ( 15 Mar 2019 07:10 )  WBC Count : 7.89 K/uL  Hemoglobin : 9.1 g/dL  Hematocrit : 27.9 %  Platelet Count - Automated : 232 K/uL  Mean Cell Volume : 86.9 fl  Mean Cell Hemoglobin : 28.3 pg  Mean Cell Hemoglobin Concentration : 32.6 gm/dL  Auto Neutrophil # : x  Auto Lymphocyte # : x  Auto Monocyte # : x  Auto Eosinophil # : x  Auto Basophil # : x  Auto Neutrophil % : x  Auto Lymphocyte % : x  Auto Monocyte % : x  Auto Eosinophil % : x  Auto Basophil % : x
No events overnight, afebrile, vss. Tolerating diet, has not had bm since admission. Denies chills, numbness, tingling, sob, cp, headaches, dizziness, muscle weakness.    general: nad, aao x3  heent: perrla, left neck incision clean and dry with nylon sutures loosely in place. No sign of active bleeding, hematoma, or infection.   pulm: cta x2, no wheezing  cards: no tyachycardia, s1/s2+  abdomen: soft, nt  extremities: sensation grossly intact, 5/5 strength throughout, rt groin wound no erythema or induration, dressing changed cdi      CBC Full  -  ( 17 Mar 2019 07:47 )  WBC Count : 8.72 K/uL  Hemoglobin : 8.7 g/dL  Hematocrit : 27.3 %  Platelet Count - Automated : 285 K/uL  Mean Cell Volume : 86.4 fl  Mean Cell Hemoglobin : 27.5 pg  Mean Cell Hemoglobin Concentration : 31.9 gm/dL  Auto Neutrophil # : x  Auto Lymphocyte # : x  Auto Monocyte # : x  Auto Eosinophil # : x  Auto Basophil # : x  Auto Neutrophil % : x  Auto Lymphocyte % : x  Auto Monocyte % : x  Auto Eosinophil % : x  Auto Basophil % : x  CBC Full  -  ( 15 Mar 2019 07:10 )  WBC Count : 7.89 K/uL  Hemoglobin : 9.1 g/dL  Hematocrit : 27.9 %  Platelet Count - Automated : 232 K/uL  Mean Cell Volume : 86.9 fl  Mean Cell Hemoglobin : 28.3 pg  Mean Cell Hemoglobin Concentration : 32.6 gm/dL  Auto Neutrophil # : x  Auto Lymphocyte # : x  Auto Monocyte # : x  Auto Eosinophil # : x  Auto Basophil # : x  Auto Neutrophil % : x  Auto Lymphocyte % : x  Auto Monocyte % : x  Auto Eosinophil % : x  Auto Basophil % : x
Patient seen and examined at bedside in the ICU.  Brief period of hypotension noted by nursing staff after dilaudid administration.  Started on low dose peripheral phenylephrine and /80 at bedside.  Patient denies pain, swelling, shortness of breath.      ICU Vital Signs Last 24 Hrs  T(C): 36.8 (13 Mar 2019 04:00), Max: 37.1 (13 Mar 2019 03:00)  T(F): 98.3 (13 Mar 2019 04:00), Max: 98.8 (13 Mar 2019 03:00)  HR: 61 (13 Mar 2019 07:00) (59 - 78)  BP: 122/57 (13 Mar 2019 07:00) (75/38 - 131/56)  BP(mean): 73 (13 Mar 2019 07:00) (47 - 78)  ABP: 147/59 (13 Mar 2019 07:00) (102/33 - 147/60)  ABP(mean): 89 (13 Mar 2019 07:00) (57 - 92)  RR: 17 (13 Mar 2019 07:00) (8 - 23)  SpO2: 100% (13 Mar 2019 07:00) (98% - 100%)    Gen:  NAD, AOX3  HEENT:  EOMI, CN2-12 grossly intact, dressing over left neck C/D/I, no evidence of hematoma, active bleeding.  CV:  s1 s2  Pulm: CTAB  abd:  soft  ext:  no edema, 5+ motor strength in bilateral upper and lower extremities                           10.0   12.36 )-----------( 212      ( 13 Mar 2019 06:45 )             30.3   03-13    141  |  112<H>  |  10  ----------------------------<  164<H>  4.2   |  19<L>  |  0.74    Ca    8.6      13 Mar 2019 06:45    TPro  7.9  /  Alb  3.8  /  TBili  0.4  /  DBili  x   /  AST  28  /  ALT  27  /  AlkPhos  97  03-12
Pt was seen and examiend at bedside this morning with vascular surgery team. No acute overnight events reported by nursing staff. Dressing changed. Off pressor. Vitals stable. Denies fever/cp/sob/n/v/d/c. Vitals stable    ICU Vital Signs Last 24 Hrs  T(C): 37.8 (14 Mar 2019 06:00), Max: 38.8 (13 Mar 2019 19:45)  T(F): 100 (14 Mar 2019 06:00), Max: 101.8 (13 Mar 2019 19:45)  HR: 72 (14 Mar 2019 07:00) (68 - 100)  BP: 133/56 (14 Mar 2019 07:00) (107/44 - 148/60)  BP(mean): 76 (14 Mar 2019 07:00) (56 - 91)  ABP: 142/53 (13 Mar 2019 16:00) (124/49 - 160/67)  ABP(mean): 81 (13 Mar 2019 16:00) (73 - 103)  RR: 19 (14 Mar 2019 07:00) (13 - 27)  SpO2: 97% (14 Mar 2019 07:00) (93% - 100%)      Gen:  NAD, AOX3  HEENT:  EOMI, CN2-12 grossly intact, dressing over left neck C/D/I, no evidence of hematoma, active bleeding.  CV:  s1 s2  Pulm: CTAB  abd:  soft  ext:  no edema, 5+ motor strength in bilateral upper and lower extremities                              9.4    8.16  )-----------( 220      ( 14 Mar 2019 06:24 )             29.0     03-13    141  |  112<H>  |  10  ----------------------------<  164<H>  4.2   |  19<L>  |  0.74    Ca    8.6      13 Mar 2019 06:45    TPro  7.9  /  Alb  3.8  /  TBili  0.4  /  DBili  x   /  AST  28  /  ALT  27  /  AlkPhos  97  03-12
Pt was seen and examined at bedside this morning with surgery team. No acute overnight events reported by nursing staff. Pt offers no new complaints at this time. Denies fever/cp/sob/n/v/d/c. Vitals stable.     T(C): 37.1 (03-18-19 @ 05:34), Max: 37.3 (03-17-19 @ 16:20)  HR: 75 (03-18-19 @ 05:34) (72 - 94)  BP: 101/50 (03-18-19 @ 05:34) (101/50 - 142/61)  RR: 17 (03-18-19 @ 05:34) (17 - 98)  SpO2: 100% (03-18-19 @ 05:34) (99% - 100%)    PHYSICAL EXAM:  Constitutional: NAD, GCS: 15/15  AOX3  Eyes:  WNL  ENMT:  WNL  Neck:  WNL, non tender  Back: Non tender  Respiratory: CTABL  Cardiovascular:  S1+S2+0  Surgical site: Dressing c/d/i , no strikethough noted, no erythema/exudate/induration noted   Gastrointestinal: Soft, ND , NT  Genitourinary:  WNL  Extremities: NV intact  Vascular:  Intact  Neurological: No focal neurological deficit,  CN, motor and sensory system grossly intact.  Skin: WNL  Musculoskeletal: WNL  Psychiatric: Grossly WNL                          8.7    8.72  )-----------( 285      ( 17 Mar 2019 07:47 )             27.3     03-17    141  |  108  |  9   ----------------------------<  107<H>  4.2   |  29  |  0.76    Ca    9.3      17 Mar 2019 07:47  Phos  2.9     03-16  Mg     2.0     03-16
The patient was seen and examined at bedside. No acute events overnight.   No chest pain or shortness of breath.   No palpitations.     PAST MEDICAL & SURGICAL HISTORY:  CAD s/p CABG (x4 6/09) and PCI diag 12/2018  Carotid artery disease - CEA on L side with infection  HTN  HLD  Hx of tobacco abuse  Hematoma  TIA (transient ischemic attack): 11/2018  Stented coronary artery: 12/10/2018   AAA (Abdominal Aortic Aneurysm): Dx. 5/2009- attempted repair  12/2009 surgery aborted due to hypotension.r  ADD (Attention Deficit Disorder)  S/P AAA repair: 2010  S/P CABG (Coronary Artery Bypass Graft): 5/2009 x 4 vessels  Right hernia repair  S/P Tonsillectomy      MEDICATIONS  (STANDING):  aspirin enteric coated 81 milliGRAM(s) Oral daily  ceFAZolin   IVPB 2000 milliGRAM(s) IV Intermittent every 8 hours  docusate sodium 100 milliGRAM(s) Oral three times a day  metoprolol succinate ER 25 milliGRAM(s) Oral daily  potassium phosphate / sodium phosphate powder 2 Packet(s) Oral two times a day  rosuvastatin 20 milliGRAM(s) Oral at bedtime    MEDICATIONS  (PRN):  acetaminophen   Tablet .. 1000 milliGRAM(s) Oral every 6 hours PRN Temp greater or equal to 38C (100.4F), Mild Pain (1 - 3), Moderate Pain (4 - 6), Severe Pain (7 - 10)  oxyCODONE    IR 10 milliGRAM(s) Oral every 6 hours PRN Moderate Pain (4 - 6)    _____________________________________________  REVIEW OF SYSTEMS  A 10 point review of system has been performed, and is negative except for what has been mentioned in the above history of present illness.   Vital Signs Last 24 Hrs  T(C): 37.1 (15 Mar 2019 04:59), Max: 37.2 (14 Mar 2019 17:24)  T(F): 98.7 (15 Mar 2019 04:59), Max: 98.9 (14 Mar 2019 17:24)  HR: 66 (15 Mar 2019 04:59) (66 - 83)  BP: 107/41 (15 Mar 2019 04:59) (99/47 - 134/66)  BP(mean): 70 (14 Mar 2019 15:40) (58 - 97)  RR: 18 (15 Mar 2019 04:59) (16 - 24)  SpO2: 100% (15 Mar 2019 04:59) (98% - 100%)  I&O's Summary    14 Mar 2019 07:01  -  15 Mar 2019 07:00  --------------------------------------------------------  IN: 200 mL / OUT: 850 mL / NET: -650 mL      _____________________________________________  PHYSICAL EXAM:  GENERAL APPEARANCE:  No acute distress  HEAD: normocephalic, atraumatic  NECK: supple, no jugular venous distention, no carotid bruit    HEART: regular rate and rhythm, S1, S2 normal, 1/6 systolic murmur  CHEST:  No anterior chest wall tenderness    LUNGS:  Clear to auscultation, without any wheezing, rhonchi or rales    ABDOMEN soft, nontender, nondistended, with positive bowel sounds appreciated  EXTREMITIES: no clubbing, cyanosis, or edema.   NEURO:  Alert and oriented x3  PSYC:  Normal affect  SKIN:  Dry     LABS                      9.1    7.89  )-----------( 232      ( 15 Mar 2019 07:10 )             27.9          03-15    143  |  109<H>  |  5<L>  ----------------------------<  114<H>  4.0   |  27  |  0.69    Ca    9.5      15 Mar 2019 07:10  Phos  2.6     03-15  Mg     2.1     03-15    RADIOLOGY & ADDITIONAL STUDIES: none recent    Assessment and Plan:   · Assessment		  ICoronary artery disease status post CABG with recent PCI in December 2018-no anginal symptoms  Carotid artery disease status post carotid endarterectomy, with complications req revision.  Hypertension  Hyperlipidemia  Prior history of tobacco abuse  History of medical noncompliance    Plan:   Continued on Plavix, with continuation of low-dose aspirin. Continue with statin. Continue beta blocker.  Echocardiogram pending given positive blood culture with staph aureus to rule out endocarditis.  Patient may end up needing GABRIELA given high propensity of staph aureus to cause valvular endocarditis. Discuss with ID. Kasi GABRIELA on monday.  Intravenous antibiotics as per infectious disease  Vasc surgery following for carotid disease.  DVT and GI prophylaxis    _____________________________________________________________________________________________  Thank you for allowing me to participate in the care of your patient. Please contact me should any questions arise.    SOFIA Mckay DO, [facility]  [phone]
The patient was seen and examined at bedside. No acute events overnight.   No chest pain or shortness of breath.   No palpitations.   Echo showed mild valve disease    PAST MEDICAL & SURGICAL HISTORY:  CAD s/p CABG (x4 6/09) and PCI diag 12/2018  Carotid artery disease - CEA on L side with infection  HTN  HLD  Hx of tobacco abuse  Hematoma  TIA (transient ischemic attack): 11/2018  Stented coronary artery: 12/10/2018   AAA (Abdominal Aortic Aneurysm): Dx. 5/2009- attempted repair  12/2009 surgery aborted due to hypotension.r  ADD (Attention Deficit Disorder)  S/P AAA repair: 2010  S/P CABG (Coronary Artery Bypass Graft): 5/2009 x 4 vessels  Right hernia repair  S/P Tonsillectomy    MEDICATIONS  (STANDING):  aspirin enteric coated 81 milliGRAM(s) Oral daily  ceFAZolin   IVPB 2000 milliGRAM(s) IV Intermittent every 8 hours  docusate sodium 100 milliGRAM(s) Oral three times a day  metoprolol succinate ER 25 milliGRAM(s) Oral daily  rosuvastatin 20 milliGRAM(s) Oral at bedtime    MEDICATIONS  (PRN):  acetaminophen   Tablet .. 1000 milliGRAM(s) Oral every 6 hours PRN Temp greater or equal to 38C (100.4F), Mild Pain (1 - 3), Moderate Pain (4 - 6), Severe Pain (7 - 10)  oxyCODONE    IR 10 milliGRAM(s) Oral every 6 hours PRN Moderate Pain (4 - 6)    _____________________________________________  REVIEW OF SYSTEMS  A 10 point review of system has been performed, and is negative except for what has been mentioned in the above history of present illness.   Vital Signs Last 24 Hrs  T(C): 36.6 (16 Mar 2019 12:54), Max: 37 (15 Mar 2019 18:15)  T(F): 97.9 (16 Mar 2019 12:54), Max: 98.6 (15 Mar 2019 18:15)  HR: 81 (16 Mar 2019 12:54) (68 - 81)  BP: 138/73 (16 Mar 2019 12:54) (121/67 - 138/73)  BP(mean): --  RR: 18 (16 Mar 2019 12:54) (16 - 18)  SpO2: 99% (16 Mar 2019 12:54) (99% - 99%)  I&O's Summary    16 Mar 2019 07:01  -  16 Mar 2019 13:54  --------------------------------------------------------  IN: 360 mL / OUT: 0 mL / NET: 360 mL      _____________________________________________  PHYSICAL EXAM  GENERAL APPEARANCE:  No acute distress  HEAD: normocephalic, atraumatic  NECK: supple, no jugular venous distention, no carotid bruit    HEART: regular rate and rhythm, S1, S2 normal, 1/6 systolic murmur  CHEST:  No anterior chest wall tenderness    LUNGS:  Clear to auscultation, without any wheezing, rhonchi or rales    ABDOMEN soft, nontender, nondistended, with positive bowel sounds appreciated  EXTREMITIES: no clubbing, cyanosis, or edema.   NEURO:  Alert and oriented x3  PSYC:  Normal affect  SKIN:  Dry          LABS:                        9.9    6.99  )-----------( 292      ( 16 Mar 2019 09:48 )             30.2          03-16    143  |  110<H>  |  7   ----------------------------<  140<H>  4.0   |  25  |  0.71    Ca    10.0      16 Mar 2019 09:48  Phos  2.9     03-16  Mg     2.0     03-16      Assessment and Plan:   · Assessment		  ICoronary artery disease status post CABG with recent PCI in December 2018-no anginal symptoms  Carotid artery disease status post carotid endarterectomy, with complications req revision.  Hypertension  Hyperlipidemia  Prior history of tobacco abuse  History of medical noncompliance    Plan:   Continued to hold  Plavix, with continuation of low-dose aspirin. Will FU with vasc surg   Continue with statin.   Echocardiogram showed mild valve disease.  Plan for EE given high propensity of staph aureus to cause valvular endocarditis. Discussed with ID.   Nothing by mouth after midnight on Sunday for GABRIELA on Monday.   Intravenous antibiotics as per infectious disease  Vasc surgery following for carotid disease.  DVT and GI prophylaxis    _____________________________________________________________________________________________  Thank you for allowing me to participate in the care of your patient. Please contact me should any questions arise.    SOFIA Mckay DO, [facility]  [phone]                  RADIOLOGY & ADDITIONAL STUDIES:    ASSESSMENT:      PLAN:        _____________________________________________________________________________________________  Thank you for allowing me to participate in the care of your patient. Please contact me should any questions arise.    SOFIA Mckay DO, [facility]  [phone]
The patient was seen and examined at bedside. No acute events overnight.   No chest pain or shortness of breath.   No palpitations.   Echo showed mild valve disease - for GABRIELA today    PAST MEDICAL & SURGICAL HISTORY:  CAD s/p CABG (x4 6/09) and PCI diag 12/2018  Carotid artery disease - CEA on L side with infection  HTN  HLD  Hx of tobacco abuse  Hematoma  TIA (transient ischemic attack): 11/2018  Stented coronary artery: 12/10/2018   AAA (Abdominal Aortic Aneurysm): Dx. 5/2009- attempted repair  12/2009 surgery aborted due to hypotension.r  ADD (Attention Deficit Disorder)  S/P AAA repair: 2010  S/P CABG (Coronary Artery Bypass Graft): 5/2009 x 4 vessels  Right hernia repair  S/P Tonsillectomy    MEDICATIONS  (STANDING):  aspirin enteric coated 81 milliGRAM(s) Oral daily  ceFAZolin   IVPB 2000 milliGRAM(s) IV Intermittent every 8 hours  docusate sodium 100 milliGRAM(s) Oral three times a day  metoprolol succinate ER 25 milliGRAM(s) Oral daily  rosuvastatin 20 milliGRAM(s) Oral at bedtime    MEDICATIONS  (PRN):  acetaminophen   Tablet .. 1000 milliGRAM(s) Oral every 6 hours PRN Temp greater or equal to 38C (100.4F), Mild Pain (1 - 3), Moderate Pain (4 - 6), Severe Pain (7 - 10)  oxyCODONE    IR 10 milliGRAM(s) Oral every 6 hours PRN Moderate Pain (4 - 6)    _____________________________________________  REVIEW OF SYSTEMS  A 10 point review of system has been performed, and is negative except for what has been mentioned in the above history of present illness.   Vital Signs Last 24 Hrs  T(C): 37.1 (18 Mar 2019 09:29), Max: 37.3 (17 Mar 2019 16:20)  T(F): 98.8 (18 Mar 2019 09:29), Max: 99.2 (17 Mar 2019 16:20)  HR: 91 (18 Mar 2019 09:29) (72 - 94)  BP: 152/58 (18 Mar 2019 09:29) (101/50 - 152/58)  BP(mean): 82 (18 Mar 2019 09:29) (82 - 82)  RR: 18 (18 Mar 2019 09:29) (17 - 98)  SpO2: 100% (18 Mar 2019 09:29) (99% - 100%)  I&O's Summary    _____________________________________________     PHYSICAL EXAM  GENERAL APPEARANCE:  No acute distress  HEAD: normocephalic, atraumatic  NECK: supple, surg dressing on neck dry    HEART: regular rate and rhythm, S1, S2 normal, 1/6 systolic murmur  CHEST:  No anterior chest wall tenderness    LUNGS:  Clear to auscultation, without any wheezing, rhonchi or rales    ABDOMEN soft, nontender, nondistended, with positive bowel sounds appreciated  EXTREMITIES: no clubbing, cyanosis, or edema.   NEURO:  Alert and oriented x3  PSYC:  Normal affect  SKIN:  Dry     LABS:                        8.7    8.72  )-----------( 285      ( 17 Mar 2019 07:47 )             27.3          03-17    141  |  108  |  9   ----------------------------<  107<H>  4.2   |  29  |  0.76    Ca    9.3      17 Mar 2019 07:47    Assessment:  Coronary artery disease status post CABG with recent PCI in December 2018-no anginal symptoms  Carotid artery disease status post carotid endarterectomy, with complications req revision.  Hypertension  Hyperlipidemia  Prior history of tobacco abuse  History of medical noncompliance    Plan:   Echocardiogram showed mild valve disease. For GABRIELA this AM given high propensity of staph aureus to cause valvular endocarditis.  Continued to hold  Plavix, with continuation of low-dose aspirin. Call made to [facility] reg resuming plavix  Continue with statin.   Intravenous antibiotics as per infectious disease  [facility] surgery following for carotid disease.  DVT and GI prophylaxis    Thank you for allowing me to participate in the care of your patient. Please contact me should any questions arise.    SOFIA Mckay DO, [place] [phone]
The patient was seen and examined at bedside. No acute events overnight.   No chest pain or shortness of breath.   No palpitations.   s/p GABRIELA    PAST MEDICAL & SURGICAL HISTORY:  CAD s/p CABG (x4 6/09) and PCI diag 12/2018  Carotid artery disease - CEA on L side with infection  HTN  HLD  Hx of tobacco abuse  Hematoma  TIA (transient ischemic attack): 11/2018  Stented coronary artery: 12/10/2018   AAA (Abdominal Aortic Aneurysm): Dx. 5/2009- attempted repair  12/2009 surgery aborted due to hypotension.r  ADD (Attention Deficit Disorder)  S/P AAA repair: 2010  S/P CABG (Coronary Artery Bypass Graft): 5/2009 x 4 vessels  Right hernia repair  S/P Tonsillectomy    MEDICATIONS  (STANDING):  aspirin enteric coated 81 milliGRAM(s) Oral daily  ceFAZolin   IVPB 2000 milliGRAM(s) IV Intermittent every 8 hours  docusate sodium 100 milliGRAM(s) Oral three times a day  metoprolol succinate ER 25 milliGRAM(s) Oral daily  rosuvastatin 20 milliGRAM(s) Oral at bedtime    MEDICATIONS  (PRN):  acetaminophen   Tablet .. 1000 milliGRAM(s) Oral every 6 hours PRN Temp greater or equal to 38C (100.4F), Mild Pain (1 - 3), Moderate Pain (4 - 6), Severe Pain (7 - 10)  oxyCODONE    IR 10 milliGRAM(s) Oral every 6 hours PRN Moderate Pain (4 - 6)    _____________________________________________  REVIEW OF SYSTEMS  A 10 point review of system has been performed, and is negative except for what has been mentioned in the above history of present illness.   Vital Signs Last 24 Hrs  T(C): 36.7 (19 Mar 2019 12:49), Max: 37.3 (19 Mar 2019 04:09)  T(F): 98 (19 Mar 2019 12:49), Max: 99.2 (19 Mar 2019 04:09)  HR: 72 (19 Mar 2019 12:49) (72 - 82)  BP: 106/53 (19 Mar 2019 12:49) (102/45 - 106/53)  BP(mean): --  RR: 18 (19 Mar 2019 12:49) (16 - 18)  SpO2: 97% (19 Mar 2019 12:49) (97% - 100%)  I&O's Summary    _____________________________________________  PHYSICAL EXAM  GENERAL APPEARANCE:  No acute distress  HEAD: normocephalic, atraumatic  NECK: supple, surg dressing on neck dry    HEART: regular rate and rhythm, S1, S2 normal, 1/6 systolic murmur  CHEST:  No anterior chest wall tenderness    LUNGS:  Clear to auscultation, without any wheezing, rhonchi or rales    ABDOMEN soft, nontender, nondistended, with positive bowel sounds appreciated  EXTREMITIES: no clubbing, cyanosis, or edema.   NEURO:  Alert and oriented x3  PSYC:  Normal affect  SKIN:  Dry     Assessment:  Coronary artery disease status post CABG with recent PCI in December 2018-no anginal symptoms  Carotid artery disease status post carotid endarterectomy, with complications req revision.  Hypertension  Hyperlipidemia  Prior history of tobacco abuse  History of medical noncompliance    Plan:   Echocardiogram showed mild valve disease w/o evidence of endocarditis on GABRIELA    Continued to hold  Plavix, with continuation of low-dose aspirin. Discussed with vascular, Plavix remains on hold.  Newer  data suggests that the length antiplatelet therapy can shorten. We'll have to monitor closely as an outpatient and resume Plavix once safe from a surgical standpoint.  Continue with statin.   Intravenous antibiotics as per infectious disease s/p mid line   Vasc surgery following for carotid disease.  DVT and GI prophylaxis  As there are no active cardiac issues, I will sign off.  Thank you for allowing me to participate in the care of your patient. Please contact me should any questions arise.    SOFIA Mckay, , [facility]  [phone]
was called by nurse regards to saturated left neck dressing.  She stated as patient is POD1 the dressing had not yet been changed but there was concern as it was saturated. Took the dressing down at bedside, no active bleeding noted, dressing with dry serosanguinous drainage, no hematoma or bleeding noted. Replaced with 4x4s, and telfa dressing.  I left patient in stable condition    Vital Signs Last 24 Hrs  T(C): 36.9 (03-13-19 @ 09:00)  T(F): 98.5 (03-13-19 @ 09:00), Max: 98.8 (03-13-19 @ 03:00)  HR: 78 (03-13-19 @ 14:00) (59 - 78)  BP: 147/64 (03-13-19 @ 14:00)  BP(mean): 86 (03-13-19 @ 14:00) (47 - 86)  RR: 21 (03-13-19 @ 14:00) (8 - 23)  SpO2: 99% (03-13-19 @ 14:00) (99% - 100%)  Wt(kg): --    03-12 @ 07:01  -  03-13 @ 07:00  --------------------------------------------------------  IN: 2369 mL / OUT: 925 mL / NET: 1444 mL
Patient is a 63y old  Male who presents with a chief complaint of +VE BLOOD CX (13 Mar 2019 07:53)      HPI:  Patient is 63-year-old with history of hypertension, coronary artery disease status post CABG 2009, history of ischemic cardiomyopathy, left ventricular ejection fraction 50-55% in the past, he recently had a stent placed to 1st diagonal of LAD at [facility] on December 10, 2018, at that time his CORREA to LAD was patent and saphenous vein gout 2 OM1/or 2 were also patent. He also has a history of aortic abdominal aneurysm status post endovascular repair in the past, he recently had an episode of TIA and he was diagnosed to have significant carotid artery disease and he status post surgery. After surgery he has been having hematoma at the surgical site and mild bleeding. His recent blood cultures are reported to be positive side advised him to go to the emergency room. He states he may have had fever and chills one day prior to admission. He denies any chest pain, shortness of breath, palpitations or dizziness. He is otherwise comfortable and is in no acute distress. He is in NSR on telemetry.         Transthoracic Echocardiogram w/ Doppler (12.18.09   Conclusions:  1. Normal mitral valve. Minimal mitral regurgitation.  2. Calcified trileaflet aortic valve with normal opening.  No  aortic valve regurgitation seen.  3. Mild left atrial enlargement.  4. Normal left ventricular internal dimensions and wall  thicknesses.  5. Mild segmental left ventricular dysfunction. EF=45-50%.  The septum is severely hypokinetic.  6. Normal right ventricular size and systolic function.    PAST MEDICAL & SURGICAL HISTORY:  Hematoma  TIA (transient ischemic attack): 11/2018  Stented coronary artery: 12/10/2018 D1  TIA (transient ischemic attack): 11/2018  Carotid stenosis, left  Hyperlipidemia  AAA (Abdominal Aortic Aneurysm): Dx. 5/2009- attempted repair  12/2009 surgery aborted due to hypotension.r  HTN (Hypertension), Benign  ADD (Attention Deficit Disorder)  CABG (Coronary Artery Bypass Graft)x4 6/09  CAD (Coronary Artery Disease)  History of left-sided carotid endarterectomy  S/P AAA repair: 2010  S/P CABG (Coronary Artery Bypass Graft): 5/2009 x 4 vessels  right hernia repair  S/P Tonsillectomy          MEDICATIONS  (STANDING):  ceFAZolin   IVPB 2000 milliGRAM(s) IV Intermittent every 8 hours  clopidogrel Tablet 75 milliGRAM(s) Oral daily  metoprolol succinate ER 25 milliGRAM(s) Oral daily  phenylephrine    Infusion 0.5 MICROgram(s)/kG/Min (13.331 mL/Hr) IV Continuous <Continuous>  rosuvastatin 20 milliGRAM(s) Oral at bedtime  sodium chloride 0.9%. 1000 milliLiter(s) (75 mL/Hr) IV Continuous <Continuous>    MEDICATIONS  (PRN):  acetaminophen   Tablet .. 1000 milliGRAM(s) Oral every 6 hours PRN Temp greater or equal to 38C (100.4F), Mild Pain (1 - 3), Moderate Pain (4 - 6), Severe Pain (7 - 10)  HYDROmorphone  Injectable 1 milliGRAM(s) IV Push every 4 hours PRN Severe Pain (7 - 10)  oxyCODONE    IR 5 milliGRAM(s) Oral every 4 hours PRN Moderate Pain (4 - 6)          REVIEW OF SYSTEM:  Pertinent items are noted in HPI.  Constitutional	Negative for chills, fevers, sweats.    Eyes: 	Negative for visual disturbance.  Ears, nose, mouth, throat, and face: Negative for epistaxis, nasal congestion, sore throat and tinnitus.  Neck:	Negative for enlargement, pain and difficulty in swallowing , he has swelling  carotid artery surgery site.  Respiration : Negative for cough, dyspnea on exertion, pleuritic chest pain and wheezing  Cardiovascular: Negative for chest pain, dyspnea and palpitations    Gastrointestinal : Negative for abdominal pain, diarrhea, nausea and vomiting  Genitourinary: Negative for dysuria, frequency and urinary incontinence .  Skin: Negative for  rash, pruritus, swelling, dryness .  	  Hematologic/lymphatic: Negative for bleeding and easy bruising  Musculoskeletal: Negative for arthralgias, back pain and muscle weakness.  Neurological: Negative for dizziness, headaches, seizures and tremors  Behavioral/Psych: Negative for mood change, depression.  Endocrine:	Negative for blurry vision, polydipsia and polyuria, diaphoresis.   Allergic/Immunologic:	Negative for anaphylaxis, angioedema and urticaria.      Vital Signs Last 24 Hrs  T(C): 36.9 (13 Mar 2019 09:00), Max: 37.1 (13 Mar 2019 03:00)  T(F): 98.5 (13 Mar 2019 09:00), Max: 98.8 (13 Mar 2019 03:00)  HR: 76 (13 Mar 2019 10:00) (59 - 78)  BP: 148/58 (13 Mar 2019 10:00) (75/38 - 148/58)  BP(mean): 82 (13 Mar 2019 10:00) (47 - 82)  RR: 14 (13 Mar 2019 10:00) (8 - 23)  SpO2: 100% (13 Mar 2019 10:00) (98% - 100%)    I&O's Summary    12 Mar 2019 07:01  -  13 Mar 2019 07:00  --------------------------------------------------------  IN: 2369 mL / OUT: 925 mL / NET: 1444 mL      PHYSICAL EXAM  General Appearance: cooperative, no acute distress,   HEENT: PERRL, conjunctiva clear, EOM's intact, non injected pharynx   Neck: Supple, , no adenopathy, thyroid: not enlarged, bandage  left carotid surgical site.  Lungs: Clear to auscultation bilateral,no adventitious breath sounds, normal   expiratory phase  Heart: Regular rate and rhythm, S1, S2 normal, no murmur, rub or gallop  Abdomen: Soft, non-tender, bowel sounds active , no hepatosplenomegaly  Extremities: no cyanosis or edema, no joint swelling  Skin: Skin color, texture normal, no rashes   Neurologic: Alert and oriented X3 , cranial nerves intact, sensory and motor normal,        INTERPRETATION OF TELEMETRY: NSR premature ventricle beats a brief episode of wide  complex tachycardia consisted of 6 beats.    ECG: NSR oold anterior wall MI.        LABS:                          10.0   12.36 )-----------( 212      ( 13 Mar 2019 06:45 )             30.3     03-13    141  |  112<H>  |  10  ----------------------------<  164<H>  4.2   |  19<L>  |  0.74    Ca    8.6      13 Mar 2019 06:    TPro  7.9  /  Alb  3.8  /  TBili  0.4  /  DBili  x   /  AST  28  /  ALT  27  /  AlkPhos  97  03-12            PT/INR - ( 12 Mar 2019 11:35 )   PT: 13.3 sec;   INR: 1.19 ratio         PTT - ( 12 Mar 2019 11:35 )  PTT:37.0 sec          RADIOLOGY & ADDITIONAL STUDIES:

## 2019-03-20 ENCOUNTER — TRANSCRIPTION ENCOUNTER (OUTPATIENT)
Age: 64
End: 2019-03-20

## 2019-03-20 ENCOUNTER — APPOINTMENT (OUTPATIENT)
Dept: VASCULAR SURGERY | Facility: CLINIC | Age: 64
End: 2019-03-20

## 2019-03-20 VITALS
SYSTOLIC BLOOD PRESSURE: 111 MMHG | TEMPERATURE: 99 F | RESPIRATION RATE: 18 BRPM | OXYGEN SATURATION: 100 % | DIASTOLIC BLOOD PRESSURE: 55 MMHG | HEART RATE: 73 BPM

## 2019-03-20 RX ORDER — CEFTRIAXONE 500 MG/1
2000 INJECTION, POWDER, FOR SOLUTION INTRAMUSCULAR; INTRAVENOUS ONCE
Qty: 0 | Refills: 0 | Status: COMPLETED | OUTPATIENT
Start: 2019-03-20 | End: 2019-03-20

## 2019-03-20 RX ADMIN — Medication 100 MILLIGRAM(S): at 04:37

## 2019-03-20 RX ADMIN — Medication 100 MILLIGRAM(S): at 04:35

## 2019-03-20 RX ADMIN — OXYCODONE HYDROCHLORIDE 10 MILLIGRAM(S): 5 TABLET ORAL at 04:34

## 2019-03-20 RX ADMIN — Medication 81 MILLIGRAM(S): at 11:44

## 2019-03-20 RX ADMIN — Medication 25 MILLIGRAM(S): at 04:35

## 2019-03-20 RX ADMIN — OXYCODONE HYDROCHLORIDE 10 MILLIGRAM(S): 5 TABLET ORAL at 10:08

## 2019-03-20 RX ADMIN — CEFTRIAXONE 2000 MILLIGRAM(S): 500 INJECTION, POWDER, FOR SOLUTION INTRAMUSCULAR; INTRAVENOUS at 13:10

## 2019-03-20 RX ADMIN — OXYCODONE HYDROCHLORIDE 10 MILLIGRAM(S): 5 TABLET ORAL at 05:04

## 2019-03-20 RX ADMIN — OXYCODONE HYDROCHLORIDE 10 MILLIGRAM(S): 5 TABLET ORAL at 16:07

## 2019-03-20 RX ADMIN — Medication 100 MILLIGRAM(S): at 13:13

## 2019-03-20 RX ADMIN — Medication 1000 MILLIGRAM(S): at 08:18

## 2019-03-20 NOTE — DISCHARGE NOTE NURSING/CASE MANAGEMENT/SOCIAL WORK - NSDCPNINST_GEN_ALL_CORE
do not drive while taking narcotics.   drink plenty of water and have some fiber to keep bowels moving without difficulty.   report to doctor immediately if any signs of infection noted to neck.  keep Mid-line covered with plastic wrap if showering.   do not submerge in water

## 2019-03-20 NOTE — DISCHARGE NOTE PROVIDER - NSDCCPCAREPLAN_GEN_ALL_CORE_FT
PRINCIPAL DISCHARGE DIAGNOSIS  Diagnosis: Cellulitis of neck  Assessment and Plan of Treatment:       SECONDARY DISCHARGE DIAGNOSES  Diagnosis: Positive blood cultures  Assessment and Plan of Treatment:

## 2019-03-20 NOTE — DISCHARGE NOTE NURSING/CASE MANAGEMENT/SOCIAL WORK - NSDCDPATPORTLINK_GEN_ALL_CORE
You can access the [facility] Patient Portal, offered by [facility], by registering with the following website: [email or web address]

## 2019-03-20 NOTE — DISCHARGE NOTE PROVIDER - HOSPITAL COURSE
Pt is a 64 yo male s/p evacuation of left infected carotid hematoma, with removal of graft and interposition carotid graft with rgsv    POD #6. Pt tolerate procedure well, and was with no post operative complications. Per ID reccs pt will be dc home with     midline cath for admin of rocephin until 3/28. TTE/GABRIELA was negative on this admission for any cardiac pathology. Pt ok for dc home and will follow up in Dr. Sanders vascular surgery clinic.         - abx per ID - ancef 2g q8. picc line inserted yesterday will go home on rocephin until 3/28    - TTE/GABRIELA negative for vegetations    - plavix held for now - on asa/statin    - left neck wound healing well - no sign of infection, bleeding, or hematoma    - labs reviewed, monitor blood cx    - Anticipate possible dc home tmrw    - will discuss w Dr Burciaga

## 2019-03-25 ENCOUNTER — APPOINTMENT (OUTPATIENT)
Dept: VASCULAR SURGERY | Facility: CLINIC | Age: 64
End: 2019-03-25
Payer: COMMERCIAL

## 2019-03-25 PROCEDURE — 99024 POSTOP FOLLOW-UP VISIT: CPT

## 2019-03-25 NOTE — ASSESSMENT
[FreeTextEntry1] : 63 year old male s/p CEA with infected hematoma and subsequent patch infection and bacteremia s/p left carotid patch excision and CCA-ICA vein bypass 2 weeks ago.\par Wounds are well healed\par No fever or chills\par On home IV Rocephin, to complete course in 3 days\par Will return in 2 weeks for follow up

## 2019-03-25 NOTE — HISTORY OF PRESENT ILLNESS
[FreeTextEntry1] : 63 year old male s/p left carotid endarterectomy last month.\par He is on DAPT for recent PCI with YULIA.\par He developed a moderate sized left neck hematoma with oozing from his incision.\par He denies dysphagia, neck pain, dyspnea or noisy breathing.\par \par Interval History: S/P hematoma evacuation and wash out 3 weeks ago.\par Wound near completely healed, however he developed a blister on the inferior aspect of the incision over the last 2 days. CTA revealed active extravasation from his carotid patch.\par He is s/p left carotid pacth excision and interposition vein bypass from CCA to ICA\par  [de-identified] : Neck wound healing well\par All sutures removed.\par Has midline IV for home antibiotic infusion.\par To complete course on 3/28/19\par No fever or chills.\par On ASA 81

## 2019-03-26 VITALS
SYSTOLIC BLOOD PRESSURE: 130 MMHG | WEIGHT: 151 LBS | OXYGEN SATURATION: 100 % | HEART RATE: 59 BPM | DIASTOLIC BLOOD PRESSURE: 71 MMHG | BODY MASS INDEX: 23.7 KG/M2 | HEIGHT: 67 IN

## 2019-03-26 DIAGNOSIS — Z86.73 PERSONAL HISTORY OF TRANSIENT ISCHEMIC ATTACK (TIA), AND CEREBRAL INFARCTION WITHOUT RESIDUAL DEFICITS: ICD-10-CM

## 2019-03-26 DIAGNOSIS — R78.81 BACTEREMIA: ICD-10-CM

## 2019-03-26 DIAGNOSIS — E78.5 HYPERLIPIDEMIA, UNSPECIFIED: ICD-10-CM

## 2019-03-26 DIAGNOSIS — T82.7XXA INFECTION AND INFLAMMATORY REACTION DUE TO OTHER CARDIAC AND VASCULAR DEVICES, IMPLANTS AND GRAFTS, INITIAL ENCOUNTER: ICD-10-CM

## 2019-03-26 DIAGNOSIS — T81.40XA INFECTION FOLLOWING A PROCEDURE, UNSPECIFIED, INITIAL ENCOUNTER: ICD-10-CM

## 2019-03-26 DIAGNOSIS — L03.221 CELLULITIS OF NECK: ICD-10-CM

## 2019-03-26 DIAGNOSIS — Z79.82 LONG TERM (CURRENT) USE OF ASPIRIN: ICD-10-CM

## 2019-03-26 DIAGNOSIS — I10 ESSENTIAL (PRIMARY) HYPERTENSION: ICD-10-CM

## 2019-03-26 DIAGNOSIS — I25.10 ATHEROSCLEROTIC HEART DISEASE OF NATIVE CORONARY ARTERY WITHOUT ANGINA PECTORIS: ICD-10-CM

## 2019-03-26 DIAGNOSIS — Z91.19 PATIENT'S NONCOMPLIANCE WITH OTHER MEDICAL TREATMENT AND REGIMEN: ICD-10-CM

## 2019-03-26 DIAGNOSIS — Z95.1 PRESENCE OF AORTOCORONARY BYPASS GRAFT: ICD-10-CM
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
## 2019-03-26 DIAGNOSIS — Z87.891 PERSONAL HISTORY OF NICOTINE DEPENDENCE: ICD-10-CM
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
## 2019-04-08 ENCOUNTER — APPOINTMENT (OUTPATIENT)
Dept: VASCULAR SURGERY | Facility: CLINIC | Age: 64
End: 2019-04-08
Payer: COMMERCIAL

## 2019-04-08 VITALS
HEIGHT: 67 IN | TEMPERATURE: 98.1 F | SYSTOLIC BLOOD PRESSURE: 146 MMHG | HEART RATE: 67 BPM | WEIGHT: 151 LBS | BODY MASS INDEX: 23.7 KG/M2 | RESPIRATION RATE: 14 BRPM | DIASTOLIC BLOOD PRESSURE: 75 MMHG

## 2019-04-08 PROCEDURE — 99024 POSTOP FOLLOW-UP VISIT: CPT

## 2019-04-08 NOTE — ASSESSMENT
[FreeTextEntry1] : 63 year old male s/p CEA with infected hematoma and subsequent patch infection and bacteremia s/p left carotid patch excision and CCA-ICA vein bypass 4 weeks ago.\par Wounds are well healed\par Resume Plavix\par Return in 3 months

## 2019-04-08 NOTE — PHYSICAL EXAM
[JVD] : no jugular venous distention  [Normal Heart Sounds] : normal heart sounds [Normal Rate and Rhythm] : normal rate and rhythm [2+] : left 2+ [Ankle Swelling (On Exam)] : not present [Abdomen Masses] : No abdominal masses [Alert] : alert [Oriented to Person] : oriented to person [Oriented to Place] : oriented to place [Calm] : calm [de-identified] : AAO x 3, NAD [de-identified] : Well healed left neck incision

## 2019-04-08 NOTE — HISTORY OF PRESENT ILLNESS
[FreeTextEntry1] : 63 year old male s/p left carotid endarterectomy last month.\par He is on DAPT for recent PCI with YULIA.\par He developed a moderate sized left neck hematoma with oozing from his incision.\par He denies dysphagia, neck pain, dyspnea or noisy breathing.\par \par Interval History: S/P hematoma evacuation and wash out 3 weeks ago.\par Wound near completely healed, however he developed a blister on the inferior aspect of the incision. CTA revealed active extravasation from his carotid patch.\par He is s/p left carotid patch excision and interposition vein bypass from CCA to ICA\par  [de-identified] : Now 4 weeks s/p lef CCA-ICA  bypass.\par Wound completely healed.\par Has completed course of IV antibiotics.\par Without complaints

## 2019-06-01 NOTE — ED PROVIDER NOTE - CPE EDP MUSC NORM
Pt. States he's feeling better. Pt. Sat on side of bed and gets anxious, but when prompted to stand and look up is able to. Pt. Stood and walked 15 feet with slow gait. Dr. Graham Kirk made aware. normal...

## 2019-07-01 ENCOUNTER — APPOINTMENT (OUTPATIENT)
Dept: VASCULAR SURGERY | Facility: CLINIC | Age: 64
End: 2019-07-01
Payer: COMMERCIAL

## 2019-07-01 VITALS
SYSTOLIC BLOOD PRESSURE: 143 MMHG | DIASTOLIC BLOOD PRESSURE: 71 MMHG | OXYGEN SATURATION: 99 % | BODY MASS INDEX: 25.11 KG/M2 | HEART RATE: 69 BPM | HEIGHT: 67 IN | WEIGHT: 160 LBS

## 2019-07-01 PROCEDURE — 93882 EXTRACRANIAL UNI/LTD STUDY: CPT

## 2019-07-01 PROCEDURE — 99213 OFFICE O/P EST LOW 20 MIN: CPT

## 2019-07-01 NOTE — PHYSICAL EXAM
[JVD] : no jugular venous distention  [Normal Heart Sounds] : normal heart sounds [Normal Rate and Rhythm] : normal rate and rhythm [2+] : left 2+ [Ankle Swelling (On Exam)] : not present [Abdomen Masses] : No abdominal masses [Alert] : alert [Oriented to Person] : oriented to person [Oriented to Place] : oriented to place [Calm] : calm [de-identified] : AAO x 3, NAD [de-identified] : Well healed left neck incision

## 2019-07-01 NOTE — ASSESSMENT
[FreeTextEntry1] : 63 year old male s/p left carotid patch blowout s/p left CCA to ICA bypass 4 months ago\par He has no complaints today.\par Graft duplex today shows patent bypass with no hemodynamically significant stenosis\par Return in 6 months for graft surveillance duplex

## 2019-07-01 NOTE — HISTORY OF PRESENT ILLNESS
[FreeTextEntry1] : 63 year old male s/p left carotid endarterectomy last month.\par He is on DAPT for recent PCI with YULIA.\par He developed a moderate sized left neck hematoma with oozing from his incision.\par He denies dysphagia, neck pain, dyspnea or noisy breathing.\par \par Interval History: S/P hematoma evacuation post op\par Wound near completely healed, however he developed a blister on the inferior aspect of the incision. CTA revealed active extravasation from his carotid patch.\par He is s/p left carotid patch excision and interposition vein bypass from CCA to ICA on 3/13/19\par  [de-identified] : Without complaints today

## 2019-09-10 NOTE — ED ADULT NURSE NOTE - IN THE PAST 12 MONTHS HAVE YOU USED DRUGS OTHER THAN THOSE REQUIRED FOR MEDICAL REASON?
Visited with patient today to  papers needed to assist with patients homelessness  Patient was at her daughters house on the 5th floor  The elevator at this time is broken so the daughter was able to bring necessary papers down to me  Copies were made to return to patient, and all necessary papers will be going to [employer] and [employer] 
No

## 2019-09-22 NOTE — DISCHARGE NOTE ADULT - PERSONAL RISK FACTORS
Attempted to perform EKG on patient and Draw patients labs. Patient gone to Radiology at this time. Nurse aware.     Ambika Mckoy  09/22/19 0122     Statement Selected

## 2019-10-24 NOTE — ED PROVIDER NOTE - AGGRAVATING FACTORS
[facility]  Lenka Chaudhari MD  Neurology clinic  [phone]    With anti-seizure medications, you may initially notice side effects of fatigue, drowsiness, unsteadiness, and dizziness.  Other possible side effects include nausea, abdominal pain, headache, blurry or double vision, slurred speech and mood changes.  Generally, patients will noticed these symptoms when the medication is first started or with higher doses and will go away with time.    It is import to consistently take your medication every day.  Missing just one dose may put you at risk for a breakthrough seizure.  Consider using reminders on your phone or a pill box.    If you develop a rash, please call the neurology clinic immediately or notify another healthcare professional, as this may be potentially life-threatening.  If you are unable to reach a healthcare professional, go to the emergency room immediately for further evaluation.    If you develop thoughts of wanting to hurt yourself or others, please call the neurology clinic immediately to notify another healthcare professional.  If you are unable to reach a healthcare professional, go to the emergency room immediately for further evaluation.    Taking anti-seizure medications may increase the risk of birth defects.  If you are a female of child-bearing potential, it is recommended that you take folic acid (1-4 mg) daily.  This may reduce the risk of birth defects while pregnant and taking seizure medication.  If you become pregnant, contact our office immediately. You will need to be followed very closely (at least monthly appointments).  I also recommend contacting The [employer] American Antiepileptic Drug Pregnancy Registry at www.aedpregnancyregistry.org or 1-110.704.7755.    It is the [place] state law that you cannot drive within 90 days of a seizure.    You should avoid certain activities that if you were to have a seizure, you could harm yourself or others. In  general, it is recommended that you avoid operating heavy machinery or power tools, swimming or taking baths by yourself (showers are ok), don't stand over open flames, don't get on high ladders or the roof.  I also recommend to avoid sleeping on your stomach.    For further information on epilepsy and resources available to patients and their families, please visit the Epilepsy [facility] at www.efky.org or call [phone].    Increase keppra to 750 mg twice a day.  Decrease OXC to 600 mg twice a day.  Do this for a week.    Then increase keppra to 1000 mg twice a day and decrease OXC to 300 mg twice a day.  Do this for a week.    Then stop OXC.     none

## 2020-01-06 ENCOUNTER — APPOINTMENT (OUTPATIENT)
Dept: VASCULAR SURGERY | Facility: CLINIC | Age: 65
End: 2020-01-06
Payer: COMMERCIAL

## 2020-01-06 VITALS
HEART RATE: 70 BPM | HEIGHT: 67 IN | BODY MASS INDEX: 25.11 KG/M2 | WEIGHT: 160 LBS | TEMPERATURE: 97.7 F | OXYGEN SATURATION: 97 % | DIASTOLIC BLOOD PRESSURE: 97 MMHG | SYSTOLIC BLOOD PRESSURE: 157 MMHG

## 2020-01-06 PROCEDURE — 99213 OFFICE O/P EST LOW 20 MIN: CPT

## 2020-01-06 PROCEDURE — 93978 VASCULAR STUDY: CPT

## 2020-01-06 PROCEDURE — 93880 EXTRACRANIAL BILAT STUDY: CPT

## 2020-01-06 RX ORDER — AMOXICILLIN AND CLAVULANATE POTASSIUM 875; 125 MG/1; MG/1
875-125 TABLET, COATED ORAL
Qty: 14 | Refills: 0 | Status: DISCONTINUED | COMMUNITY
Start: 2019-02-01 | End: 2020-01-06

## 2020-01-06 RX ORDER — METOPROLOL SUCCINATE 25 MG/1
25 TABLET, EXTENDED RELEASE ORAL
Qty: 90 | Refills: 3 | Status: DISCONTINUED | COMMUNITY
End: 2020-01-06

## 2020-01-06 NOTE — ASSESSMENT
[FreeTextEntry1] : 64 year old male s/p left carotid patch blowout s/p left CCA to ICA bypass in March 2019\par He has no complaints today.\par Graft duplex today shows patent bypass with no hemodynamically significant stenosis\par Return in 12 months for graft surveillance duplex. \par

## 2020-01-06 NOTE — PHYSICAL EXAM
[JVD] : no jugular venous distention  [Normal Heart Sounds] : normal heart sounds [Normal Rate and Rhythm] : normal rate and rhythm [2+] : left 2+ [Ankle Swelling (On Exam)] : not present [Abdomen Masses] : No abdominal masses [Alert] : alert [Oriented to Person] : oriented to person [Oriented to Place] : oriented to place [Calm] : calm [de-identified] : Well healed left neck incision [de-identified] : AAO x 3, NAD

## 2020-01-06 NOTE — HISTORY OF PRESENT ILLNESS
[FreeTextEntry1] : 64 year old male s/p left carotid endarterectomy\par He is on DAPT for recent PCI with YULIA.\par He developed a moderate sized left neck hematoma with oozing from his incision.\par He denies dysphagia, neck pain, dyspnea or noisy breathing.\par \par Interval History: S/P hematoma evacuation post op\par Wound near completely healed, however he developed a blister on the inferior aspect of the incision. CTA revealed active extravasation from his carotid patch.\par He is s/p left carotid patch excision and interposition vein bypass from CCA to ICA on 3/13/19\par  [de-identified] : Without complaints today\par Feels well. No interval TIA or CVA

## 2020-05-01 NOTE — DISCHARGE NOTE ADULT - PAIN PRESENT
Patient called stating that she received a letter from [facility] requesting mammogram order to be placed as a follow up from her previous biopsy No

## 2020-05-06 NOTE — PATIENT PROFILE ADULT - NSPROHMSYMPCOND_GEN_A_NUR
Labs drawn via PICC and dressing and cap change done today.  Pt is scheduled for chemo tomorrow.  
none

## 2020-05-26 NOTE — ED PROVIDER NOTE - TIMING
SPOKE WITH PATIENT, GAVE HER DR. HULL MESSAGE.    Result Notes for Tissue Pathology Exam     Notes recorded by Sang Jeffers MD on 5/24/2020 at 8:42 AM EDT  Please let Daniela know she does not have h pylori. She also does not have Richards's esophagus. Dr. Jeffers       
sudden onset

## 2020-07-24 NOTE — H&P PST ADULT - OPHTHALMOLOGIC
Double O-Z Plasty Text: The defect edges were debeveled with a #15 scalpel blade.  Given the location of the defect, shape of the defect and the proximity to free margins a Double O-Z plasty (double transposition flap) was deemed most appropriate.  Using a sterile surgical marker, the appropriate transposition flaps were drawn incorporating the defect and placing the expected incisions within the relaxed skin tension lines where possible. The area thus outlined was incised deep to adipose tissue with a #15 scalpel blade.  The skin margins were undermined to an appropriate distance in all directions utilizing iris scissors.  Hemostasis was achieved with electrocautery.  The flaps were then transposed into place, one clockwise and the other counterclockwise, and anchored with interrupted buried subcutaneous sutures. negative

## 2020-08-20 NOTE — PATIENT PROFILE ADULT - BRADEN MOISTURE
Physical Therapy Daily Treatment Note  Date:  2020    Patient Name:  Elias West    :  1943  MRN: [id number]    Restrictions/Precautions: Position Activity Restriction  Other position/activity restrictions: medium fall risk 2/2 LE pain/weakness, no history of falls. Pertinent Medical History: Additional Pertinent Hx: HTN, HBP, colon CA, CAD, arthritis, sleep apneagout, cpap, afib,  lida TKR, lumbar laminectomy    Medical/Treatment Diagnosis Information:  · Diagnosis: bilateral knee pain, left hip pain, back pain  · Treatment Diagnosis: Decreased functional mobility 2/2 weakness/pain    Insurance/Certification information:  PT Insurance Information: [employer] Medicare  Physician Information:  Referring Practitioner: [address] [place] [address] signed (Y/N):  sent on eval date    Visit# / total visits:   Pain level: 3/10      History of Injury:   Recent history of bilateral knee pain L>R,  5/ with activity, left hip pain and back pain as well. Noted that when chemo was done at the end of , that he didn't have the strength in his legs that he did before, and had pain in both knees. Subjective: Low back is bothering him more today (3-4/10), and there is always some pain in his knees. Limited to standing or walking about 5 minutes, pain in both knees, left moreso than right, occasional warmth left knee,  effortful to move in bed, sit to stand, and walk.   Pain 5-6/10 lida knees primarily    Objective:      LE ROM  WFL,   Lumbar ROM - to be assessed  Strength - hip flexion  4-/5, knee extension 4/5, flexion  4/5, ankles WFL      Exercise/Equipment Resistance/Repetitions Other comments     Painful knees, left hip , back, decreased strength/ endurance post Chemo for colon CA   Nustep L1 x 6 min   Seat 9 UE 9    HSS step 30 sec x 2    GSS incline  HR/TR   2 x 30 sec   X 15 each      Balance     Tandem 30 sec/R/L    Airex n reno  X 30 sec                    Standing abd  Standing flexion  Standing extension  Standing knee flexion X 10 L/R   X 10 L/R  X 10  L/R  X 10 L/R       fitter F/B one thin x 10 each  S/s  ADD         Leg Press 30# 2 x 10     Knee ext (green) 15# 2 x 10 B               SKTC 10 sec x 5    LTR 10         Glut sets 3 sec x 10    Add sets  Tband abd hooklying 10 x 5 sec  Orange ball  Orange x 10   Orange TB issued for hep 8/20        SLR X 10 R/L Difficulty with left   Clamshell add         MHP Seated  Large one across back 12 min   CP velcro'd to left knee 12 min                  HEP SLR, glut set, add set. Other Therapeutic Activities:  Pt was educated on PT POC, Diagnosis, Prognosis, pathomechanics as well as frequency and duration of scheduling future physical therapy appointments. Time was also taken on this day to answer all patient questions and participation in PT. Reviewed appointment policy in detail with patient and patient verbalized understanding. Home Exercise Program:  Patient instructed in the following for HEP as noted above. Patient verbalized/demonstrated understanding .     Treatment/Activity Tolerance:  [x] Patient tolerated treatment well [x] Patient limited by fatigue  [] Patient limited by pain  [] Patient limited by other medical complications  [] Other:     Functional Progress see subj    Prognosis: [x] Good [] Fair  [] Poor    Patient Requires Follow-up: [x] Yes  [] No    Plan:   [x] Continue per plan of care [] Alter current plan (see comments)  [] Plan of care initiated [] Hold pending MD visit [] Discharge    Plan for Next Session:  Add above as stated      Goals:    Short term goals  Time Frame for Short term goals: Discharge 8 weeks  Short term goal 1: Decrease pain to minimal to allow normal ADL/gait  Short term goal 2: Normalize LE flexibiltiy for increased ease with donning shoes/dressing  Short term goal 3: Normalize LE strength to allow normal gait/steps without limitation  Short term goal 4: Maintain good core stability during light ADL  Short term goal 5: Balance/Lumbar ROM goals to be set when fully evaluated              Functional Assessment:  LEFs  39    Charges: Therapeutic Exercise:  [x] (40893) Provided verbal/tactile cueing for activities to restore or maintain strength, flexibility, endurance, ROM for improvements with self-care, mobility, lifting and ambulation. Neuromuscular Re-Education  [] (52257) Provided verbal/tactile cueing for activities to restore or maintain balance, coordination, kinesthetic sense, posture, motor skill, proprioception for self-care, mobility, lifting, and ambulation. Therapeutic Activities:    [x] (20024) Provided verbal/tactile cueing to address functional limitations related to loss of mobility, strength, balance, and coordination. Gait Training:  [] (07965) Provided training and instruction to the patient for proper postural muscle recruitment and positioning with ambulation re-education     Home Exercise Program:    [x] (21238) Reviewed/Progressed HEP activities related to strengthening, flexibility, endurance, ROM for functional self-care, mobility, lifting and ambulation   [] (32174) Reviewed/Progressed HEP activities related to improving balance, coordination, kinesthetic sense, posture, motor skill, proprioception for self-care, mobility, lifting, and ambulation      Manual Treatments:  MFR / STM / Oscillations-Mobs:  G-I, II, III, IV / Manipulation / MLD  [x] (08323) Provided manual therapy to mobilize  soft tissue/joints/fluid for the purpose of modulating pain, promoting relaxation, increasing ROM, reducing/eliminating soft tissue swelling/inflammation/restriction, improving soft tissue extensibility and allowing for proper ROM for normal function with self- care, mobility, lifting and ambulation.       Timed Code Treatment Minutes: 40   Total Treatment Minutes: 55     [] EVAL (LOW) 30353   [] EVAL (MOD) 55984   [] EVAL (HIGH) 19756   [] RE-EVAL   [x] TE (48114) x 3    [] Aquatic (01220) x  [] NMR (41090)   x  [] Aquatic Group (26955) x  [] Manual (73093) x    [] Ultrasound (98488) x  [] TA (68176) x                      Mech Traction (01869)  [] Ionto (34610)           [] ES (un) (89985):   [] Vasopump (85796) [] Other:          Electronically signed by:  Marianne Silveira, [address] (4) rarely moist

## 2021-01-11 NOTE — BRIEF OPERATIVE NOTE - ELECTIVE PROCEDURE
Thank you for allowing us to participate in your care. Based on your examination today the following recommendations are being made.       * Labs, liver Ultrasound and Hepatology consultation for elevated liver function tests.  * EMG/NC of the left upper extremity.          For questions regarding your health or the recommendations that are being made today please contact our office at [phone] or [email or web address].      Yes

## 2021-01-21 NOTE — PATIENT PROFILE ADULT - ANY SIGNIFICANT CHANGE IN ABILITY TO PERFORM THE FOLLOWING ADL SINCE THE ONSET OF PRESENT ILLNESS?
2021    TELEHEALTH EVALUATION -- Audio/Visual (During [id number] public health emergency)    HPI:    Guanako Alcocer (:  1960) has requested an audio/video evaluation for the following concern(s):    Pt here for follow up on chronic conditions. .    Pt states he has been having issues with memory and was seen by neurology and was put on exelon in past but had to stop it due to it burning  His skin. Pt has also been seeing Dr. Diaz Clemente for his sleep issues. He also has ED and states he took five viagra for the medication to work. He did see a urologist in past for kidney stones. For his depression, he has been seeing therapist at [facility] and in process to set up psychiatry as well. States he is doing well on the current dose of effexor. Denies any suicidal ideation    For his asthma, has been using his inhalers. Albuterol mainly when needed. He is also requesting refill on atorvastatin, states he tolerates it fine. Review of Systems   Constitutional: Negative for chills and fever. HENT: Negative for rhinorrhea and sore throat. Respiratory: Negative for chest tightness and shortness of breath. Cardiovascular: Negative for chest pain. Gastrointestinal: Negative for nausea and vomiting. Genitourinary:        ED   Psychiatric/Behavioral: Negative for dysphoric mood and suicidal ideas. Memory loss       Prior to Visit Medications    Medication Sig Taking? Authorizing Provider   albuterol sulfate  (90 Base) MCG/ACT inhaler inhale 2 puffs by mouth every 6 hours if needed for wheezing Yes Sandra Medhkour, DO   sildenafil (VIAGRA) 100 MG tablet Take 1 tablet by mouth as needed for Erectile Dysfunction Do note take more than one tablet.  Yes Sandra Medhkour, DO   atorvastatin (LIPITOR) 10 MG tablet Take 1 tablet by mouth daily Yes Sandra Medhkour, DO   RA ALLERGY RELIEF 180 MG tablet take 1 tablet by mouth once daily  Sandra Medhkour, DO   fluticasone (FLONASE) 50 MCG/ACT nasal spray instill 2 sprays into each nostril once daily  [place] Squibb, DO   carvedilol (COREG) 25 MG tablet Take 1 tablet by mouth 2 times daily  Sandra Medhkour, DO   venlafaxine (EFFEXOR XR) 150 MG extended release capsule Take 1 capsule by mouth daily  Sandra Medhkour, DO   traZODone (DESYREL) 50 MG tablet Take 1 po qhs x 2 days the 2po qhs  Mahamed Valero MD   tiZANidine (ZANAFLEX) 4 MG tablet Take 1 tablet by mouth 3 times daily as needed (Back pain)  Randell Blancas MD   fluticasone-salmeterol (ADVAIR) 100-50 MCG/DOSE diskus inhaler Inhale 1 puff into the lungs every 12 hours  Sandra Medhkour, DO   nicotine (NICODERM CQ) 21 MG/24HR Place 1 patch onto the skin daily  Sandra Medhkour, DO   topiramate (TOPAMAX) 50 MG tablet take 1.5 tab by mouth twice a day  Sudhir Arevalo MD   fluticasone (FLONASE) 50 MCG/ACT nasal spray instill 2 sprays into each nostril once daily  Sandra Medhkour, DO   diclofenac sodium (VOLTAREN) 1 % GEL apply 4 grams to affected area four times a day AS NEEDED FOR PAIN  Historical Provider, MD   pregabalin (LYRICA) 50 MG capsule take 1 capsule by mouth twice a day  Historical Provider, MD   fluticasone (FLOVENT HFA) 110 MCG/ACT inhaler inhale 2 puffs by mouth INTO THE LUNGS twice a day  Sandra Medhkour, DO   amLODIPine (NORVASC) 10 MG tablet take 1 tablet by mouth once daily  Sandra Medhkour, DO   benzonatate (TESSALON) 100 MG capsule take 1 capsule by mouth three times a day if needed for cough  Patient not taking: Reported on 10/21/2020  [facility] DO Da   oxyCODONE-acetaminophen (PERCOCET) 5-325 MG per tablet Take 1 tablet by mouth every 4 hours as needed for Pain.   Historical Provider, MD   hyoscyamine (LEVSIN) 125 MCG tablet Take 1 tablet by mouth every 6 hours as needed for Cramping  Versie Share, DO   docusate sodium (COLACE) 100 MG capsule Take 1 capsule by mouth 2 times daily  Patient not taking: Reported on 1/5/2021  Trinidad Lees DO   tamsulosin (FLOMAX) 0.4 MG capsule Take 1 capsule by mouth daily  Patient not taking: Reported on 1/5/2021  Miguel Ángel Colin MD   DULoxetine (CYMBALTA) 30 MG extended release capsule Take 30 mg by mouth  Historical Provider, MD   cloNIDine (CATAPRES) 0.1 MG tablet take 1 tablet by mouth twice a day IF BLOOD PRESSURE IS GREATER THAN 140/90  Barry Kelly MD   diphenhydrAMINE (BENADRYL) 25 MG tablet Take 25 mg by mouth every 6 hours as needed for Itching  Historical Provider, MD   nitroGLYCERIN (NITROSTAT) 0.3 MG SL tablet place 1 tablet under the tongue if needed every 5 minutes for chest pain for 3 doses IF NO RELIEF AF  Pamella Huffman MD   aspirin 81 MG tablet Take 81 mg by mouth daily. Historical Provider, MD       Social History     Tobacco Use    Smoking status: Current Every Day Smoker     Packs/day: 0.25     Years: 28.00     Pack years: 7.00     Types: Cigarettes    Smokeless tobacco: Never Used   Substance Use Topics    Alcohol use: Not Currently     Comment: rarely    Drug use: No            PHYSICAL EXAMINATION:  [ INSTRUCTIONS:  \"[x]\" Indicates a positive item  \"[]\" Indicates a negative item  -- DELETE ALL ITEMS NOT EXAMINED]  Vital Signs: (As obtained by patient/caregiver or practitioner observation)      Constitutional: [x] Appears well-developed and well-nourished [x] No apparent distress      [] Abnormal-   Mental status  [x] Alert and awake  [x] Oriented to person/place/time [x]Able to follow commands      Eyes:  EOM    [x]  Normal  [] Abnormal-  Sclera  [x]  Normal  [] Abnormal -         Discharge [x]  None visible  [] Abnormal -    HENT:   [x] Normocephalic, atraumatic.   [] Abnormal   [x] Mouth/Throat: Mucous membranes are moist.     Neck: [x] No visualized mass     Pulmonary/Chest: [x] Respiratory effort normal.  [x] No visualized signs of difficulty breathing or respiratory distress        [] Abnormal-       Neurological:        [x] No Facial Asymmetry (Cranial nerve 7 motor function) (limited exam to video visit)          [] No gaze palsy        [] Abnormal-         Skin:        [x] No significant exanthematous lesions or discoloration noted on facial skin         [] Abnormal-            Psychiatric:       [x] Normal Affect [x] No Hallucinations        [] Abnormal-     Other pertinent observable physical exam findings-     ASSESSMENT/PLAN:  1. Erectile dysfunction, unspecified erectile dysfunction type  - I discussed with him that it is dangerous to take more than prescribed and it can dangerously drop his BP. Pt verbalized understanding. Refill sent . I also recommended he follow up with his urologist.   - sildenafil (VIAGRA) 100 MG tablet; Take 1 tablet by mouth as needed for Erectile Dysfunction Do note take more than one tablet. Dispense: 90 tablet; Refill: 0    2. Depression, unspecified depression type  - doing well on effexor. Continue current dose. Continue therapy, pt in process of setting up psychiatrist as well. 3. Moderate persistent asthma without complication  - continue current medication. 4. Memory loss  - I recommended pt follow up with neurology. Pt thought he was dropped by previous neurologist, but on chart review I told pt did not seem to be case and he even has appt scheduled as well, so I recommend he call their office. He was seeing new neurologist Dr. Jonathan Clakr for sleep issues as well. Return in about 3 months (around 4/21/2021) for follow up. Maria Elena Rousseau is a 62 y.o. male being evaluated by a Virtual Visit (video visit) encounter to address concerns as mentioned above. A caregiver was present when appropriate. Due to this being a TeleHealth encounter (During [address] emergency), evaluation of the following organ systems was limited: Vitals/Constitutional/EENT/Resp/CV/GI//MS/Neuro/Skin/Heme-Lymph-Imm.   Pursuant to the emergency declaration under the [facility] 1135 waiver authority and the Coronavirus Preparedness and Response Supplemental Appropriations Act, this Virtual Visit was conducted with patient's (and/or legal guardian's) consent, to reduce the patient's risk of exposure to COVID-19 and provide necessary medical care. The patient (and/or legal guardian) has also been advised to contact this office for worsening conditions or problems, and seek emergency medical treatment and/or call 911 if deemed necessary. Patient identification was verified at the start of the visit: Yes    Total time spent on this encounter: Not billed by time    Services were provided through a video synchronous discussion virtually to substitute for in-person clinic visit. Patient and provider were located at their individual homes. --Davi Rollins DO on 1/24/2021 at 9:44 PM    An electronic signature was used to authenticate this note. no

## 2021-09-05 NOTE — ED STATDOCS - CARE PLAN
MARCELA, pt with severe Left LE pain states he won't be able to do it/tolerate it.
Principal Discharge DX:	Cellulitis of neck  Secondary Diagnosis:	Positive blood cultures

## 2021-11-05 NOTE — PRE-OP CHECKLIST - TAMPON REMOVED
Advised to quit smoking.   
Has been compliant with atorvastatin since last time. Check labs today.   
Lab Results   Component Value Date    HGBA1C 6.40 (H) 06/11/2021    HGBA1C 6.70 (H) 02/05/2021    HGBA1C 6.70 (H) 09/08/2020    CREATININE 0.88 06/11/2021     (H) 06/11/2021    MICROALBUR <3.0 02/05/2021        Check labs today.   
n/a

## 2021-11-18 NOTE — ED PROVIDER NOTE - MEDICAL DECISION MAKING DETAILS
Previously Declined (within the last year)
64 y/o male with HTN, HLD, CAD, BIBA regarding dizziness, double vision, code stroke called. CTA head and neck, neuro consult, labs, observe, monitor, reassess.

## 2021-11-29 NOTE — H&P ADULT - NSHPLABSRESULTS_GEN_ALL_CORE
CBC Full  -  ( 12 Mar 2019 11:35 )  WBC Count : 7.79 K/uL  Hemoglobin : 13.4 g/dL  Hematocrit : 40.6 %  Platelet Count - Automated : 238 K/uL  Mean Cell Volume : 86.0 fl  Mean Cell Hemoglobin : 28.4 pg  Mean Cell Hemoglobin Concentration : 33.0 gm/dL  Auto Neutrophil # : 5.16 K/uL  Auto Lymphocyte # : 1.55 K/uL  Auto Monocyte # : 0.93 K/uL  Auto Eosinophil # : 0.06 K/uL  Auto Basophil # : 0.07 K/uL  Auto Neutrophil % : 66.2 %  Auto Lymphocyte % : 19.9 %  Auto Monocyte % : 11.9 %  Auto Eosinophil % : 0.8 %  Auto Basophil % : 0.9 % Composite Graft Text: The defect edges were debeveled with a #15 scalpel blade.  Given the location of the defect, shape of the defect, the proximity to free margins and the fact the defect was full thickness a composite graft was deemed most appropriate.  The defect was outline and then transferred to the donor site.  A full thickness graft was then excised from the donor site. The graft was then placed in the primary defect, oriented appropriately and then sutured into place.  The secondary defect was then repaired using a primary closure.

## 2021-12-20 ENCOUNTER — TRANSCRIPTION ENCOUNTER (OUTPATIENT)
Age: 66
End: 2021-12-20

## 2022-12-13 NOTE — PROGRESS NOTE ADULT - PROVIDER SPECIALTY LIST ADULT
Patient: Rashawn Alonso    Procedure: Procedure(s):  LEFT EYE PHACOEMULSIFICATION, CATARACT, WITH STANDARD INTRAOCULAR LENS IMPLANT INSERTION       Diagnosis: Posterior subcapsular polar senile cataract of left eye [H25.042]  Diagnosis Additional Information: No value filed.    Anesthesia Type:   MAC     Note:    Oropharynx: oropharynx clear of all foreign objects  Level of Consciousness: awake  Oxygen Supplementation: room air    Independent Airway: airway patency satisfactory and stable  Dentition: dentition unchanged  Vital Signs Stable: post-procedure vital signs reviewed and stable  Report to RN Given: handoff report given  Patient transferred to: Phase II  Comments: VSS and WNL, comfortable, no PONV, report to Evangelina MAHER  Handoff Report: Identifed the Patient, Identified the Reponsible Provider, Reviewed the pertinent medical history, Discussed the surgical course, Reviewed Intra-OP anesthesia mangement and issues during anesthesia, Set expectations for post-procedure period and Allowed opportunity for questions and acknowledgement of understanding      Vitals:  Vitals Value Taken Time   /96 12/13/22 0851   Temp 36.5  C (97.7  F) 12/13/22 0851   Pulse     Resp 16 12/13/22 0851   SpO2 95 % 12/13/22 0851       Electronically Signed By: LIZZ Velez CRNA  December 13, 2022  8:55 AM   Hospitalist

## 2023-08-17 NOTE — ED STATDOCS - PROGRESS NOTE DETAILS
Orbital.../Fat overlying ribcage... 63 yr. old male PMH: AAA, CAD, CABG, Left Carotid Stenosis, Stent in left Carotid presents to ED with positive blood cultures. Called back this am.  No fever +shivers + sweats last night. Seen and examined by attending in intake. Plan: IV, Labs, call Dr. Birch and Dr. Durant concerning to patient due to bruising and bloody drainage. Will F/U with patient and results. Slim NP Contacted Dr. Burciaga and discussed case . Will admit patient to his service as per request by Dr. Burciaga. Slim SCHROEDER  Contacted dr. Durant as per request of patient and told to stop aspirin for 3 days. Slim SCHROEDER Seen and examined by Surgical Resident. Slim SCHROEDER

## 2023-11-01 NOTE — ED STATDOCS - ENMT, MLM
Spontaneous, unlabored and symmetrical
Nasal mucosa clear.  Mouth with normal mucosa  Throat has no vesicles, no oropharyngeal exudates and uvula is midline.

## 2024-02-03 NOTE — ED ADULT NURSE NOTE - RESPIRATORY ASSESSMENT
Pt in room with his father. His wife has been updated. Pt denies any needs at this time. Pt states that the zofran helped.    - - -

## 2024-03-03 NOTE — PROGRESS NOTE ADULT - SUBJECTIVE AND OBJECTIVE BOX
I have examined the patient . I have explained risk and benefits . I have attained consent. I agree with a cardiac catheterization.
NP assessment and post cath follow up.    Patient is a 63 year old male who presents for PST and procedure for LHC. Pmhx significant for CAGB x4 in , CVA, carotid disease, TIA one month ago with improvement of symptoms. Patient recently had a stress test which showed ischemia during exersise with a medium sized moderate to severe apical anterior wall ischemia. EF 45% so the plan was made to proceed with heart cath.   Now s/p LHC with YULIA in the LAD to diag.    PMH  Other nonspecific abnormal cardiovascular system function study  Family history of myocardial infarction (Grandparent)  Hyperlipidemia  AAA (Abdominal Aortic Aneurysm)  HTN (Hypertension), Benign  ADD (Attention Deficit Disorder)  CABG (Coronary Artery Bypass Graft)x4   CAD (Coronary Artery Disease)  right hernia repair  S/P Tonsillectomy    REVIEW OF SYSTEMS    General: Denies fever, chills, pain, no discomfort  Respiratory and Thorax:  Denies cough, sob, or any discomfort  Cardiovascular:  Denies chest pain, palpitations or any discomfort	  Gastrointestinal:  Denies n/v/d, constipation, or any discomfort  Genitourinary:  Denies frequency, burning, or pain  Musculoskeletal:  Denies joint pain, swelling, or any discomfort   Neurological:  Denies headache, dizziness blurred vision, numbing or tingling  psychiatric:  Denies sadness or depression  Hematology  [place] bleeding o swelling    MEDICATIONS:  metoprolol succinate ER 25 milliGRAM(s) Oral dailt  atorvastatin 40 milliGRAM(s) Oral at bedtime  aspirin  chewable 81 milliGRAM(s) Oral daily  clopidogrel Tablet 75 milliGRAM(s) Oral daily  sodium chloride 0.9%. 1000 milliLiter(s) IV Continuous <Continuous>    PHYSICAL EXAM:  T(C): 36.6 (18 @ 05:10), Max: 36.7 (12-10-18 @ 16:17)  HR: 69 (18 @ 05:10) (49 - 69)  BP: 130/64 (18 @ 05:10) (112/58 - 154/64)  RR: 18 (18 @ 05:10) (16 - 18)  SpO2: 95% (18 @ 05:10) (95% - 100%)  I&O's Summary  10 Dec 2018 07:01  -  11 Dec 2018 07:00  --------------------------------------------------------  IN: 240 mL / OUT: 450 mL / NET: -210 mL    Daily Weight in k (11 Dec 2018 05:14)  Appearance: Normal	  HEENT:   Normal oral mucosa, PERRL, EOMI	  Lymphatic: No lymphadenopathy  Cardiovascular: Normal S1 S2, No JVD, No murmurs, No edema  Respiratory: Lungs clear to auscultation, rr wnl for rate and rhythm   Psychiatry: A & O x 3, Mood & affect appropriate  Gastrointestinal:  Soft, Non-tender, + BS	  Skin: No rashes, No ecchymoses, No cyanosis  Neurologic: Non-focal  Extremities: Normal range of motion, No clubbing, cyanosis or edema  Vascular: Peripheral pulses palpable 2+ bilaterally  Procedure Site:  Right groin, no bleeding, no pain, no bruising, no hematoma, +PP, no edema.      TELEMETRY: 	  SR, no overnight alarms or concerns                         13.0   10.1  )-----------( 232      ( 11 Dec 2018 06:13 )             39.3   143  |  109<H>  |  14.0  ----------------------------<  91  4.1   |  23.0  |  0.71  Ca    10.1      11 Dec 2018 06:13  TPro  5.9<L>  /  Alb  3.6  /  TBili  0.4  /  DBili  x   /  AST  14  /  ALT  16  /  AlkPhos  67  12-11
Nurse Practitioner Progress note: Patient is a 63 year old male who presents for PST and procedure for LHC. Pmhx significant for CAGB x4 in 2009, CVA, carotid disease, TIA one month ago with improvement of symptoms. Patient recently had a stress test which showed ischemia during exercise with a medium sized moderate to severe apical anterior wall ischemia. EF 45%. Now s/p LHC with YULIA in the LAD to diag.     MEDICATIONS:  metoprolol succinate ER 25 milliGRAM(s) Oral daily  atorvastatin 40 milliGRAM(s) Oral at bedtime  aspirin  chewable 81 milliGRAM(s) Oral daily  clopidogrel Tablet 75 milliGRAM(s) Oral daily  sodium chloride 0.9%. 1000 milliLiter(s) IV Continuous <Continuous>      TELEMETRY: NSR    T(C): 36.6 (12-10-18 @ 09:13), Max: 36.6 (12-10-18 @ 09:13)  HR: 50 (12-10-18 @ 11:20) (50 - 55)  BP: 112/59 (12-10-18 @ 11:20) (112/59 - 151/70)  RR: 18 (12-10-18 @ 11:20) (18 - 18)  SpO2: 99% (12-10-18 @ 11:20) (99% - 100%)  Wt(kg): --    PHYSICAL EXAM:  Appearance: Normal	  HEENT:   Normal oral mucosa, PERRL  Cardiovascular: Normal S1 S2, No JVD, No murmurs, No edema  Respiratory: Lungs clear to auscultation	  Psychiatry: A & O x 3, Mood & affect appropriate  Gastrointestinal:  Soft, Non-tender, + BS	  Skin: No rashes, No ecchymoses, No cyanosis  Neurologic: Non-focal, A&O X3.  No neuro deficits  Extremities: Normal range of motion, No clubbing, cyanosis or edema  Vascular: Peripheral pulses palpable 2+ bilaterally  Procedure Site: #6 RFA sheath. No bleeding, bruising, hematoma.     12 lead EK.5   10.5  )-----------( 272      ( 10 Dec 2018 09:21 )             40.6     12-10    143  |  107  |  13.0  ----------------------------<  106  4.0   |  26.0  |  0.63    Ca    10.6<H>      10 Dec 2018 09:21    TPro  6.7  /  Alb  4.2  /  TBili  0.5  /  DBili  x   /  AST  18  /  ALT  17  /  AlkPhos  66  12-10      PROCEDURE RESULTS: YULIA to LAD to Diag    ASSESSMENT/PLAN: 	  -Bedrest for 6 hours after sheath pull  -Resume home meds.  -Follow up with Dr. Bales in 1-2 weeks  -Check labs/EKG/site check in AM  -Probable discharge in AM if stable.
Patient is s/p sheath pull #6 RFA sheath was removed without complication following a LHC with a YULIA to the LAD and diag. Manual pressure was held for 20 minutes and hemostasis was achieved. There was no bleeding, bruising hematoma. Distal pulses were present.
SORE THROAT AND NASAL CONGESTION/fever

## 2024-07-22 NOTE — PROGRESS NOTE ADULT - SKIN/BREAST
[TextEntry] :  IXiao PA-C, spent 20 minutes face to face with the patient. This excludes fitting details…